# Patient Record
Sex: FEMALE | Race: WHITE | Employment: OTHER | URBAN - NONMETROPOLITAN AREA
[De-identification: names, ages, dates, MRNs, and addresses within clinical notes are randomized per-mention and may not be internally consistent; named-entity substitution may affect disease eponyms.]

---

## 2019-01-03 ENCOUNTER — HOSPITAL ENCOUNTER (OUTPATIENT)
Dept: GENERAL RADIOLOGY | Age: 69
Discharge: HOME | End: 2019-01-03
Payer: MEDICARE

## 2019-01-03 ENCOUNTER — HOSPITAL ENCOUNTER (OUTPATIENT)
Dept: PREADMISSION TESTING | Age: 69
Discharge: HOME | End: 2019-01-07
Payer: MEDICARE

## 2019-01-03 VITALS
HEART RATE: 75 BPM | OXYGEN SATURATION: 96 % | HEIGHT: 69 IN | DIASTOLIC BLOOD PRESSURE: 66 MMHG | RESPIRATION RATE: 16 BRPM | BODY MASS INDEX: 27.49 KG/M2 | SYSTOLIC BLOOD PRESSURE: 109 MMHG | WEIGHT: 185.63 LBS | TEMPERATURE: 98.8 F

## 2019-01-03 DIAGNOSIS — Z01.818 PREOP TESTING: ICD-10-CM

## 2019-01-03 DIAGNOSIS — M48.061 SPINAL STENOSIS OF LUMBAR REGION, UNSPECIFIED WHETHER NEUROGENIC CLAUDICATION PRESENT: ICD-10-CM

## 2019-01-03 LAB
ANION GAP SERPL CALCULATED.3IONS-SCNC: 16 MEQ/L (ref 8–16)
APTT: 28.9 SECONDS (ref 22–38)
BUN BLDV-MCNC: 18 MG/DL (ref 7–22)
CALCIUM SERPL-MCNC: 8.9 MG/DL (ref 8.5–10.5)
CHLORIDE BLD-SCNC: 103 MEQ/L (ref 98–111)
CO2: 24 MEQ/L (ref 23–33)
CREAT SERPL-MCNC: 0.9 MG/DL (ref 0.4–1.2)
EKG ATRIAL RATE: 72 BPM
EKG P AXIS: 38 DEGREES
EKG P-R INTERVAL: 164 MS
EKG Q-T INTERVAL: 424 MS
EKG QRS DURATION: 90 MS
EKG QTC CALCULATION (BAZETT): 464 MS
EKG R AXIS: 16 DEGREES
EKG T AXIS: 53 DEGREES
EKG VENTRICULAR RATE: 72 BPM
ERYTHROCYTE [DISTWIDTH] IN BLOOD BY AUTOMATED COUNT: 14.2 % (ref 11.5–14.5)
ERYTHROCYTE [DISTWIDTH] IN BLOOD BY AUTOMATED COUNT: 46 FL (ref 35–45)
GFR SERPL CREATININE-BSD FRML MDRD: 62 ML/MIN/1.73M2
GLUCOSE BLD-MCNC: 156 MG/DL (ref 70–108)
HCT VFR BLD CALC: 48.8 % (ref 37–47)
HEMOGLOBIN: 15 GM/DL (ref 12–16)
INR BLD: 0.93 (ref 0.85–1.13)
MCH RBC QN AUTO: 27.4 PG (ref 26–33)
MCHC RBC AUTO-ENTMCNC: 30.7 GM/DL (ref 32.2–35.5)
MCV RBC AUTO: 89.1 FL (ref 81–99)
MRSA NASAL SCREEN RT-PCR: NEGATIVE
PLATELET # BLD: 243 THOU/MM3 (ref 130–400)
PMV BLD AUTO: 10.8 FL (ref 9.4–12.4)
POTASSIUM SERPL-SCNC: 3.8 MEQ/L (ref 3.5–5.2)
RBC # BLD: 5.48 MILL/MM3 (ref 4.2–5.4)
SODIUM BLD-SCNC: 143 MEQ/L (ref 135–145)
STAPH AUREUS SCREEN RT-PCR: NEGATIVE
WBC # BLD: 6.9 THOU/MM3 (ref 4.8–10.8)

## 2019-01-03 PROCEDURE — 93010 ELECTROCARDIOGRAM REPORT: CPT | Performed by: INTERNAL MEDICINE

## 2019-01-03 PROCEDURE — 85730 THROMBOPLASTIN TIME PARTIAL: CPT

## 2019-01-03 PROCEDURE — 87640 STAPH A DNA AMP PROBE: CPT

## 2019-01-03 PROCEDURE — 85610 PROTHROMBIN TIME: CPT

## 2019-01-03 PROCEDURE — 93005 ELECTROCARDIOGRAM TRACING: CPT | Performed by: ORTHOPAEDIC SURGERY

## 2019-01-03 PROCEDURE — 71046 X-RAY EXAM CHEST 2 VIEWS: CPT

## 2019-01-03 PROCEDURE — 36415 COLL VENOUS BLD VENIPUNCTURE: CPT

## 2019-01-03 PROCEDURE — 87081 CULTURE SCREEN ONLY: CPT

## 2019-01-03 PROCEDURE — 87641 MR-STAPH DNA AMP PROBE: CPT

## 2019-01-03 PROCEDURE — 80048 BASIC METABOLIC PNL TOTAL CA: CPT

## 2019-01-03 RX ORDER — PANTOPRAZOLE SODIUM 40 MG/1
40 TABLET, DELAYED RELEASE ORAL EVERY MORNING
COMMUNITY
End: 2021-03-16

## 2019-01-03 RX ORDER — HYDROCODONE BITARTRATE AND ACETAMINOPHEN 5; 325 MG/1; MG/1
1 TABLET ORAL EVERY 12 HOURS
Status: ON HOLD | COMMUNITY
End: 2019-01-22 | Stop reason: HOSPADM

## 2019-01-03 RX ORDER — ALENDRONATE SODIUM 35 MG/1
35 TABLET ORAL
COMMUNITY
End: 2021-08-04 | Stop reason: SDUPTHER

## 2019-01-03 ASSESSMENT — PAIN DESCRIPTION - PROGRESSION: CLINICAL_PROGRESSION: NOT CHANGED

## 2019-01-03 ASSESSMENT — PAIN DESCRIPTION - ORIENTATION: ORIENTATION: LEFT

## 2019-01-03 ASSESSMENT — PAIN SCALES - GENERAL: PAINLEVEL_OUTOF10: 6

## 2019-01-03 ASSESSMENT — PAIN DESCRIPTION - PAIN TYPE: TYPE: CHRONIC PAIN

## 2019-01-03 ASSESSMENT — PAIN DESCRIPTION - ONSET: ONSET: AWAKENED FROM SLEEP

## 2019-01-03 ASSESSMENT — PAIN DESCRIPTION - DESCRIPTORS: DESCRIPTORS: THROBBING

## 2019-01-03 ASSESSMENT — PAIN DESCRIPTION - FREQUENCY: FREQUENCY: CONTINUOUS

## 2019-01-03 ASSESSMENT — PAIN DESCRIPTION - LOCATION: LOCATION: HIP

## 2019-01-05 LAB — MRSA SCREEN: NORMAL

## 2019-01-18 ENCOUNTER — HOSPITAL ENCOUNTER (INPATIENT)
Age: 69
LOS: 4 days | Discharge: NURSING, REHAB OR LONG-TERM CARE | DRG: 460 | End: 2019-01-22
Attending: ORTHOPAEDIC SURGERY | Admitting: ORTHOPAEDIC SURGERY
Payer: MEDICARE

## 2019-01-18 ENCOUNTER — ANESTHESIA (OUTPATIENT)
Dept: OPERATING ROOM | Age: 69
DRG: 460 | End: 2019-01-18
Payer: MEDICARE

## 2019-01-18 ENCOUNTER — APPOINTMENT (OUTPATIENT)
Dept: GENERAL RADIOLOGY | Age: 69
DRG: 460 | End: 2019-01-18
Attending: ORTHOPAEDIC SURGERY
Payer: MEDICARE

## 2019-01-18 ENCOUNTER — ANESTHESIA EVENT (OUTPATIENT)
Dept: OPERATING ROOM | Age: 69
DRG: 460 | End: 2019-01-18
Payer: MEDICARE

## 2019-01-18 VITALS
OXYGEN SATURATION: 99 % | TEMPERATURE: 99 F | RESPIRATION RATE: 8 BRPM | SYSTOLIC BLOOD PRESSURE: 119 MMHG | DIASTOLIC BLOOD PRESSURE: 59 MMHG

## 2019-01-18 DIAGNOSIS — M48.061 SPINAL STENOSIS OF LUMBAR REGION AT MULTIPLE LEVELS: Primary | ICD-10-CM

## 2019-01-18 LAB
ABO: NORMAL
ANTIBODY SCREEN: NORMAL
GLUCOSE BLD-MCNC: 101 MG/DL (ref 70–108)
RH FACTOR: NORMAL

## 2019-01-18 PROCEDURE — 36415 COLL VENOUS BLD VENIPUNCTURE: CPT

## 2019-01-18 PROCEDURE — 86901 BLOOD TYPING SEROLOGIC RH(D): CPT

## 2019-01-18 PROCEDURE — 2580000003 HC RX 258: Performed by: PHYSICIAN ASSISTANT

## 2019-01-18 PROCEDURE — 72020 X-RAY EXAM OF SPINE 1 VIEW: CPT

## 2019-01-18 PROCEDURE — 82948 REAGENT STRIP/BLOOD GLUCOSE: CPT

## 2019-01-18 PROCEDURE — 3600000005 HC SURGERY LEVEL 5 BASE: Performed by: ORTHOPAEDIC SURGERY

## 2019-01-18 PROCEDURE — 6360000002 HC RX W HCPCS: Performed by: ORTHOPAEDIC SURGERY

## 2019-01-18 PROCEDURE — 2500000003 HC RX 250 WO HCPCS: Performed by: NURSE ANESTHETIST, CERTIFIED REGISTERED

## 2019-01-18 PROCEDURE — 3700000001 HC ADD 15 MINUTES (ANESTHESIA): Performed by: ORTHOPAEDIC SURGERY

## 2019-01-18 PROCEDURE — 0SG1071 FUSION OF 2 OR MORE LUMBAR VERTEBRAL JOINTS WITH AUTOLOGOUS TISSUE SUBSTITUTE, POSTERIOR APPROACH, POSTERIOR COLUMN, OPEN APPROACH: ICD-10-PCS | Performed by: ORTHOPAEDIC SURGERY

## 2019-01-18 PROCEDURE — 6370000000 HC RX 637 (ALT 250 FOR IP): Performed by: PHYSICIAN ASSISTANT

## 2019-01-18 PROCEDURE — 2709999900 HC NON-CHARGEABLE SUPPLY: Performed by: ORTHOPAEDIC SURGERY

## 2019-01-18 PROCEDURE — 86900 BLOOD TYPING SEROLOGIC ABO: CPT

## 2019-01-18 PROCEDURE — 0QP004Z REMOVAL OF INTERNAL FIXATION DEVICE FROM LUMBAR VERTEBRA, OPEN APPROACH: ICD-10-PCS | Performed by: ORTHOPAEDIC SURGERY

## 2019-01-18 PROCEDURE — 6360000002 HC RX W HCPCS: Performed by: PHYSICIAN ASSISTANT

## 2019-01-18 PROCEDURE — 86850 RBC ANTIBODY SCREEN: CPT

## 2019-01-18 PROCEDURE — 6360000002 HC RX W HCPCS: Performed by: ANESTHESIOLOGY

## 2019-01-18 PROCEDURE — 2780000010 HC IMPLANT OTHER: Performed by: ORTHOPAEDIC SURGERY

## 2019-01-18 PROCEDURE — 1200000000 HC SEMI PRIVATE

## 2019-01-18 PROCEDURE — 6360000002 HC RX W HCPCS: Performed by: NURSE ANESTHETIST, CERTIFIED REGISTERED

## 2019-01-18 PROCEDURE — 2720000010 HC SURG SUPPLY STERILE: Performed by: ORTHOPAEDIC SURGERY

## 2019-01-18 PROCEDURE — 3700000000 HC ANESTHESIA ATTENDED CARE: Performed by: ORTHOPAEDIC SURGERY

## 2019-01-18 PROCEDURE — 7100000000 HC PACU RECOVERY - FIRST 15 MIN: Performed by: ORTHOPAEDIC SURGERY

## 2019-01-18 PROCEDURE — 7100000001 HC PACU RECOVERY - ADDTL 15 MIN: Performed by: ORTHOPAEDIC SURGERY

## 2019-01-18 PROCEDURE — C1713 ANCHOR/SCREW BN/BN,TIS/BN: HCPCS | Performed by: ORTHOPAEDIC SURGERY

## 2019-01-18 PROCEDURE — 3600000015 HC SURGERY LEVEL 5 ADDTL 15MIN: Performed by: ORTHOPAEDIC SURGERY

## 2019-01-18 DEVICE — ROD 8690070 CDH PBNT 5.5X70 TI
Type: IMPLANTABLE DEVICE | Site: SPINE LUMBAR | Status: FUNCTIONAL
Brand: CD HORIZON® SPINAL SYSTEM

## 2019-01-18 DEVICE — ALLOGRAFT STRP DBM PLF GRFT 2.5CM X 5CM: Type: IMPLANTABLE DEVICE | Site: SPINE LUMBAR | Status: FUNCTIONAL

## 2019-01-18 DEVICE — FLOSEAL HEMOSTATIC MATRIX, 5 ML
Type: IMPLANTABLE DEVICE | Site: SPINE LUMBAR | Status: FUNCTIONAL
Brand: FLOSEAL

## 2019-01-18 DEVICE — CROSSLINK 811-322 LP MLTSP PL L 1.752.15
Type: IMPLANTABLE DEVICE | Site: SPINE LUMBAR | Status: FUNCTIONAL
Brand: TSRH® SPINAL SYSTEM

## 2019-01-18 RX ORDER — HYDROMORPHONE HCL 110MG/55ML
PATIENT CONTROLLED ANALGESIA SYRINGE INTRAVENOUS PRN
Status: DISCONTINUED | OUTPATIENT
Start: 2019-01-18 | End: 2019-01-18 | Stop reason: SDUPTHER

## 2019-01-18 RX ORDER — DEXAMETHASONE SODIUM PHOSPHATE 4 MG/ML
INJECTION, SOLUTION INTRA-ARTICULAR; INTRALESIONAL; INTRAMUSCULAR; INTRAVENOUS; SOFT TISSUE PRN
Status: DISCONTINUED | OUTPATIENT
Start: 2019-01-18 | End: 2019-01-18 | Stop reason: SDUPTHER

## 2019-01-18 RX ORDER — GLYCOPYRROLATE 1 MG/5 ML
SYRINGE (ML) INTRAVENOUS PRN
Status: DISCONTINUED | OUTPATIENT
Start: 2019-01-18 | End: 2019-01-18 | Stop reason: SDUPTHER

## 2019-01-18 RX ORDER — FENTANYL CITRATE 50 UG/ML
INJECTION, SOLUTION INTRAMUSCULAR; INTRAVENOUS PRN
Status: DISCONTINUED | OUTPATIENT
Start: 2019-01-18 | End: 2019-01-18 | Stop reason: SDUPTHER

## 2019-01-18 RX ORDER — VANCOMYCIN HYDROCHLORIDE 1 G/20ML
INJECTION, POWDER, LYOPHILIZED, FOR SOLUTION INTRAVENOUS PRN
Status: DISCONTINUED | OUTPATIENT
Start: 2019-01-18 | End: 2019-01-18 | Stop reason: HOSPADM

## 2019-01-18 RX ORDER — ACETAMINOPHEN 325 MG/1
650 TABLET ORAL EVERY 4 HOURS PRN
Status: DISCONTINUED | OUTPATIENT
Start: 2019-01-18 | End: 2019-01-22 | Stop reason: HOSPADM

## 2019-01-18 RX ORDER — CYCLOBENZAPRINE HCL 10 MG
10 TABLET ORAL 3 TIMES DAILY PRN
Status: DISCONTINUED | OUTPATIENT
Start: 2019-01-18 | End: 2019-01-22 | Stop reason: HOSPADM

## 2019-01-18 RX ORDER — OXYCODONE HYDROCHLORIDE AND ACETAMINOPHEN 5; 325 MG/1; MG/1
1 TABLET ORAL EVERY 4 HOURS PRN
Status: DISCONTINUED | OUTPATIENT
Start: 2019-01-18 | End: 2019-01-22 | Stop reason: HOSPADM

## 2019-01-18 RX ORDER — SODIUM CHLORIDE 0.9 % (FLUSH) 0.9 %
10 SYRINGE (ML) INJECTION EVERY 12 HOURS SCHEDULED
Status: DISCONTINUED | OUTPATIENT
Start: 2019-01-18 | End: 2019-01-18 | Stop reason: HOSPADM

## 2019-01-18 RX ORDER — SUCCINYLCHOLINE CHLORIDE 20 MG/ML
INJECTION INTRAMUSCULAR; INTRAVENOUS PRN
Status: DISCONTINUED | OUTPATIENT
Start: 2019-01-18 | End: 2019-01-18 | Stop reason: SDUPTHER

## 2019-01-18 RX ORDER — SODIUM CHLORIDE 0.9 % (FLUSH) 0.9 %
10 SYRINGE (ML) INJECTION PRN
Status: DISCONTINUED | OUTPATIENT
Start: 2019-01-18 | End: 2019-01-22 | Stop reason: HOSPADM

## 2019-01-18 RX ORDER — OXYCODONE HYDROCHLORIDE AND ACETAMINOPHEN 5; 325 MG/1; MG/1
2 TABLET ORAL EVERY 4 HOURS PRN
Status: DISCONTINUED | OUTPATIENT
Start: 2019-01-18 | End: 2019-01-22 | Stop reason: HOSPADM

## 2019-01-18 RX ORDER — ONDANSETRON 2 MG/ML
4 INJECTION INTRAMUSCULAR; INTRAVENOUS EVERY 6 HOURS PRN
Status: DISCONTINUED | OUTPATIENT
Start: 2019-01-18 | End: 2019-01-22 | Stop reason: HOSPADM

## 2019-01-18 RX ORDER — DOCUSATE SODIUM 100 MG/1
100 CAPSULE, LIQUID FILLED ORAL 2 TIMES DAILY
Status: DISCONTINUED | OUTPATIENT
Start: 2019-01-18 | End: 2019-01-22 | Stop reason: HOSPADM

## 2019-01-18 RX ORDER — PROPOFOL 10 MG/ML
INJECTION, EMULSION INTRAVENOUS PRN
Status: DISCONTINUED | OUTPATIENT
Start: 2019-01-18 | End: 2019-01-18 | Stop reason: SDUPTHER

## 2019-01-18 RX ORDER — ROCURONIUM BROMIDE 10 MG/ML
INJECTION, SOLUTION INTRAVENOUS PRN
Status: DISCONTINUED | OUTPATIENT
Start: 2019-01-18 | End: 2019-01-18 | Stop reason: SDUPTHER

## 2019-01-18 RX ORDER — BISACODYL 10 MG
10 SUPPOSITORY, RECTAL RECTAL DAILY PRN
Status: DISCONTINUED | OUTPATIENT
Start: 2019-01-18 | End: 2019-01-22 | Stop reason: HOSPADM

## 2019-01-18 RX ORDER — SODIUM CHLORIDE 9 MG/ML
INJECTION, SOLUTION INTRAVENOUS CONTINUOUS
Status: DISCONTINUED | OUTPATIENT
Start: 2019-01-18 | End: 2019-01-18

## 2019-01-18 RX ORDER — FENTANYL CITRATE 50 UG/ML
50 INJECTION, SOLUTION INTRAMUSCULAR; INTRAVENOUS EVERY 5 MIN PRN
Status: DISCONTINUED | OUTPATIENT
Start: 2019-01-18 | End: 2019-01-18 | Stop reason: HOSPADM

## 2019-01-18 RX ORDER — SODIUM CHLORIDE 0.9 % (FLUSH) 0.9 %
10 SYRINGE (ML) INJECTION PRN
Status: DISCONTINUED | OUTPATIENT
Start: 2019-01-18 | End: 2019-01-18 | Stop reason: HOSPADM

## 2019-01-18 RX ORDER — ACETAMINOPHEN 650 MG/1
650 SUPPOSITORY RECTAL EVERY 4 HOURS PRN
Status: DISCONTINUED | OUTPATIENT
Start: 2019-01-18 | End: 2019-01-22 | Stop reason: HOSPADM

## 2019-01-18 RX ORDER — MORPHINE SULFATE 4 MG/ML
4 INJECTION, SOLUTION INTRAMUSCULAR; INTRAVENOUS
Status: DISCONTINUED | OUTPATIENT
Start: 2019-01-18 | End: 2019-01-22 | Stop reason: HOSPADM

## 2019-01-18 RX ORDER — METOPROLOL TARTRATE 50 MG/1
50 TABLET, FILM COATED ORAL 2 TIMES DAILY
Status: DISCONTINUED | OUTPATIENT
Start: 2019-01-18 | End: 2019-01-22 | Stop reason: HOSPADM

## 2019-01-18 RX ORDER — GABAPENTIN 600 MG/1
600 TABLET ORAL 3 TIMES DAILY
Status: DISCONTINUED | OUTPATIENT
Start: 2019-01-18 | End: 2019-01-22 | Stop reason: HOSPADM

## 2019-01-18 RX ORDER — SODIUM CHLORIDE 0.9 % (FLUSH) 0.9 %
10 SYRINGE (ML) INJECTION EVERY 12 HOURS SCHEDULED
Status: DISCONTINUED | OUTPATIENT
Start: 2019-01-18 | End: 2019-01-22 | Stop reason: HOSPADM

## 2019-01-18 RX ORDER — MORPHINE SULFATE 2 MG/ML
2 INJECTION, SOLUTION INTRAMUSCULAR; INTRAVENOUS EVERY 5 MIN PRN
Status: DISCONTINUED | OUTPATIENT
Start: 2019-01-18 | End: 2019-01-18 | Stop reason: HOSPADM

## 2019-01-18 RX ORDER — NEOSTIGMINE METHYLSULFATE 1 MG/ML
INJECTION, SOLUTION INTRAVENOUS PRN
Status: DISCONTINUED | OUTPATIENT
Start: 2019-01-18 | End: 2019-01-18 | Stop reason: SDUPTHER

## 2019-01-18 RX ORDER — LABETALOL HYDROCHLORIDE 5 MG/ML
10 INJECTION, SOLUTION INTRAVENOUS EVERY 10 MIN PRN
Status: DISCONTINUED | OUTPATIENT
Start: 2019-01-18 | End: 2019-01-18 | Stop reason: HOSPADM

## 2019-01-18 RX ORDER — PANTOPRAZOLE SODIUM 40 MG/1
40 TABLET, DELAYED RELEASE ORAL EVERY MORNING
Status: DISCONTINUED | OUTPATIENT
Start: 2019-01-18 | End: 2019-01-22 | Stop reason: HOSPADM

## 2019-01-18 RX ORDER — MORPHINE SULFATE 2 MG/ML
2 INJECTION, SOLUTION INTRAMUSCULAR; INTRAVENOUS
Status: DISCONTINUED | OUTPATIENT
Start: 2019-01-18 | End: 2019-01-22 | Stop reason: HOSPADM

## 2019-01-18 RX ORDER — ONDANSETRON 2 MG/ML
INJECTION INTRAMUSCULAR; INTRAVENOUS PRN
Status: DISCONTINUED | OUTPATIENT
Start: 2019-01-18 | End: 2019-01-18 | Stop reason: SDUPTHER

## 2019-01-18 RX ORDER — MIDAZOLAM HYDROCHLORIDE 1 MG/ML
INJECTION INTRAMUSCULAR; INTRAVENOUS PRN
Status: DISCONTINUED | OUTPATIENT
Start: 2019-01-18 | End: 2019-01-18 | Stop reason: SDUPTHER

## 2019-01-18 RX ORDER — SODIUM CHLORIDE 9 MG/ML
INJECTION, SOLUTION INTRAVENOUS CONTINUOUS
Status: DISCONTINUED | OUTPATIENT
Start: 2019-01-18 | End: 2019-01-19

## 2019-01-18 RX ADMIN — Medication 0.6 MG: at 10:12

## 2019-01-18 RX ADMIN — PROPOFOL 50 MG: 10 INJECTION, EMULSION INTRAVENOUS at 09:22

## 2019-01-18 RX ADMIN — ROCURONIUM BROMIDE 35 MG: 10 INJECTION INTRAVENOUS at 08:13

## 2019-01-18 RX ADMIN — PROPOFOL 50 MG: 10 INJECTION, EMULSION INTRAVENOUS at 09:24

## 2019-01-18 RX ADMIN — NEOSTIGMINE METHYLSULFATE 4 MG: 1 INJECTION, SOLUTION INTRAVENOUS at 10:12

## 2019-01-18 RX ADMIN — SODIUM CHLORIDE: 9 INJECTION, SOLUTION INTRAVENOUS at 11:55

## 2019-01-18 RX ADMIN — MAGNESIUM HYDROXIDE 30 ML: 400 SUSPENSION ORAL at 21:05

## 2019-01-18 RX ADMIN — CYCLOBENZAPRINE HYDROCHLORIDE 10 MG: 10 TABLET, FILM COATED ORAL at 12:54

## 2019-01-18 RX ADMIN — DOCUSATE SODIUM 100 MG: 100 CAPSULE, LIQUID FILLED ORAL at 21:05

## 2019-01-18 RX ADMIN — CYCLOBENZAPRINE HYDROCHLORIDE 10 MG: 10 TABLET, FILM COATED ORAL at 21:05

## 2019-01-18 RX ADMIN — DOCUSATE SODIUM 100 MG: 100 CAPSULE, LIQUID FILLED ORAL at 12:54

## 2019-01-18 RX ADMIN — Medication 2 G: at 15:55

## 2019-01-18 RX ADMIN — SODIUM CHLORIDE: 9 INJECTION, SOLUTION INTRAVENOUS at 09:25

## 2019-01-18 RX ADMIN — SODIUM CHLORIDE: 9 INJECTION, SOLUTION INTRAVENOUS at 23:59

## 2019-01-18 RX ADMIN — MIDAZOLAM HYDROCHLORIDE 2 MG: 1 INJECTION, SOLUTION INTRAMUSCULAR; INTRAVENOUS at 08:02

## 2019-01-18 RX ADMIN — FENTANYL CITRATE 100 MCG: 50 INJECTION INTRAMUSCULAR; INTRAVENOUS at 08:02

## 2019-01-18 RX ADMIN — HYDROMORPHONE HYDROCHLORIDE 1 MG: 2 INJECTION INTRAMUSCULAR; INTRAVENOUS; SUBCUTANEOUS at 08:54

## 2019-01-18 RX ADMIN — OXYCODONE AND ACETAMINOPHEN 2 TABLET: 5; 325 TABLET ORAL at 14:35

## 2019-01-18 RX ADMIN — METOPROLOL TARTRATE 50 MG: 50 TABLET, FILM COATED ORAL at 21:05

## 2019-01-18 RX ADMIN — PROPOFOL 150 MG: 10 INJECTION, EMULSION INTRAVENOUS at 08:04

## 2019-01-18 RX ADMIN — SUCCINYLCHOLINE CHLORIDE 120 MG: 20 INJECTION, SOLUTION INTRAMUSCULAR; INTRAVENOUS at 08:04

## 2019-01-18 RX ADMIN — PROPOFOL 50 MG: 10 INJECTION, EMULSION INTRAVENOUS at 09:26

## 2019-01-18 RX ADMIN — METFORMIN HYDROCHLORIDE 500 MG: 500 TABLET ORAL at 18:08

## 2019-01-18 RX ADMIN — MORPHINE SULFATE 2 MG: 2 INJECTION, SOLUTION INTRAMUSCULAR; INTRAVENOUS at 12:54

## 2019-01-18 RX ADMIN — DEXAMETHASONE SODIUM PHOSPHATE 8 MG: 4 INJECTION, SOLUTION INTRAMUSCULAR; INTRAVENOUS at 08:13

## 2019-01-18 RX ADMIN — OXYCODONE AND ACETAMINOPHEN 2 TABLET: 5; 325 TABLET ORAL at 19:24

## 2019-01-18 RX ADMIN — FENTANYL CITRATE 50 MCG: 50 INJECTION INTRAMUSCULAR; INTRAVENOUS at 08:35

## 2019-01-18 RX ADMIN — GABAPENTIN 600 MG: 600 TABLET, FILM COATED ORAL at 22:00

## 2019-01-18 RX ADMIN — MORPHINE SULFATE 2 MG: 2 INJECTION, SOLUTION INTRAMUSCULAR; INTRAVENOUS at 10:50

## 2019-01-18 RX ADMIN — GABAPENTIN 600 MG: 600 TABLET, FILM COATED ORAL at 15:55

## 2019-01-18 RX ADMIN — SODIUM CHLORIDE: 9 INJECTION, SOLUTION INTRAVENOUS at 07:50

## 2019-01-18 RX ADMIN — Medication 2 G: at 08:20

## 2019-01-18 RX ADMIN — HYDROMORPHONE HYDROCHLORIDE 1 MG: 2 INJECTION INTRAMUSCULAR; INTRAVENOUS; SUBCUTANEOUS at 09:19

## 2019-01-18 RX ADMIN — ONDANSETRON 4 MG: 2 INJECTION INTRAMUSCULAR; INTRAVENOUS at 15:55

## 2019-01-18 RX ADMIN — PANTOPRAZOLE SODIUM 40 MG: 40 TABLET, DELAYED RELEASE ORAL at 12:54

## 2019-01-18 RX ADMIN — ONDANSETRON HYDROCHLORIDE 4 MG: 4 INJECTION, SOLUTION INTRAMUSCULAR; INTRAVENOUS at 10:12

## 2019-01-18 RX ADMIN — Medication 2 G: at 23:59

## 2019-01-18 RX ADMIN — FENTANYL CITRATE 50 MCG: 50 INJECTION, SOLUTION INTRAMUSCULAR; INTRAVENOUS at 10:45

## 2019-01-18 ASSESSMENT — PULMONARY FUNCTION TESTS
PIF_VALUE: 16
PIF_VALUE: 16
PIF_VALUE: 17
PIF_VALUE: 16
PIF_VALUE: 16
PIF_VALUE: 17
PIF_VALUE: 16
PIF_VALUE: 16
PIF_VALUE: 1
PIF_VALUE: 16
PIF_VALUE: 19
PIF_VALUE: 22
PIF_VALUE: 16
PIF_VALUE: 17
PIF_VALUE: 16
PIF_VALUE: 17
PIF_VALUE: 16
PIF_VALUE: 17
PIF_VALUE: 16
PIF_VALUE: 17
PIF_VALUE: 16
PIF_VALUE: 17
PIF_VALUE: 0
PIF_VALUE: 16
PIF_VALUE: 42
PIF_VALUE: 16
PIF_VALUE: 16
PIF_VALUE: 1
PIF_VALUE: 16
PIF_VALUE: 17
PIF_VALUE: 16
PIF_VALUE: 17
PIF_VALUE: 17
PIF_VALUE: 16
PIF_VALUE: 17
PIF_VALUE: 16
PIF_VALUE: 17
PIF_VALUE: 0
PIF_VALUE: 16
PIF_VALUE: 18
PIF_VALUE: 16
PIF_VALUE: 17
PIF_VALUE: 17
PIF_VALUE: 16
PIF_VALUE: 16
PIF_VALUE: 17
PIF_VALUE: 16
PIF_VALUE: 0
PIF_VALUE: 15
PIF_VALUE: 17
PIF_VALUE: 16
PIF_VALUE: 17
PIF_VALUE: 16
PIF_VALUE: 17
PIF_VALUE: 16
PIF_VALUE: 4
PIF_VALUE: 17
PIF_VALUE: 16
PIF_VALUE: 15
PIF_VALUE: 17
PIF_VALUE: 16
PIF_VALUE: 17
PIF_VALUE: 16
PIF_VALUE: 16
PIF_VALUE: 18
PIF_VALUE: 17
PIF_VALUE: 16
PIF_VALUE: 1
PIF_VALUE: 16
PIF_VALUE: 15
PIF_VALUE: 16
PIF_VALUE: 16
PIF_VALUE: 15
PIF_VALUE: 16
PIF_VALUE: 16
PIF_VALUE: 15
PIF_VALUE: 16
PIF_VALUE: 3
PIF_VALUE: 16
PIF_VALUE: 3
PIF_VALUE: 16
PIF_VALUE: 17
PIF_VALUE: 16
PIF_VALUE: 16

## 2019-01-18 ASSESSMENT — PAIN DESCRIPTION - LOCATION
LOCATION: BACK

## 2019-01-18 ASSESSMENT — PAIN SCALES - GENERAL
PAINLEVEL_OUTOF10: 2
PAINLEVEL_OUTOF10: 4
PAINLEVEL_OUTOF10: 10
PAINLEVEL_OUTOF10: 5
PAINLEVEL_OUTOF10: 6
PAINLEVEL_OUTOF10: 7
PAINLEVEL_OUTOF10: 7
PAINLEVEL_OUTOF10: 4
PAINLEVEL_OUTOF10: 7
PAINLEVEL_OUTOF10: 2
PAINLEVEL_OUTOF10: 9

## 2019-01-18 ASSESSMENT — PAIN DESCRIPTION - ORIENTATION
ORIENTATION: LOWER
ORIENTATION: LOWER

## 2019-01-18 ASSESSMENT — PAIN DESCRIPTION - ONSET
ONSET: ON-GOING
ONSET: ON-GOING

## 2019-01-18 ASSESSMENT — PAIN DESCRIPTION - PROGRESSION
CLINICAL_PROGRESSION: GRADUALLY IMPROVING
CLINICAL_PROGRESSION: NOT CHANGED

## 2019-01-18 ASSESSMENT — PAIN DESCRIPTION - PAIN TYPE
TYPE: SURGICAL PAIN
TYPE: SURGICAL PAIN
TYPE: ACUTE PAIN;SURGICAL PAIN

## 2019-01-18 ASSESSMENT — PAIN DESCRIPTION - DIRECTION
RADIATING_TOWARDS: LLE
RADIATING_TOWARDS: LLE

## 2019-01-18 ASSESSMENT — PAIN DESCRIPTION - DESCRIPTORS
DESCRIPTORS: THROBBING
DESCRIPTORS: THROBBING

## 2019-01-18 ASSESSMENT — PAIN - FUNCTIONAL ASSESSMENT: PAIN_FUNCTIONAL_ASSESSMENT: PREVENTS OR INTERFERES SOME ACTIVE ACTIVITIES AND ADLS

## 2019-01-18 ASSESSMENT — PAIN DESCRIPTION - FREQUENCY
FREQUENCY: CONTINUOUS
FREQUENCY: CONTINUOUS

## 2019-01-19 LAB
ANION GAP SERPL CALCULATED.3IONS-SCNC: 11 MEQ/L (ref 8–16)
BUN BLDV-MCNC: 14 MG/DL (ref 7–22)
CALCIUM SERPL-MCNC: 8.3 MG/DL (ref 8.5–10.5)
CHLORIDE BLD-SCNC: 106 MEQ/L (ref 98–111)
CO2: 29 MEQ/L (ref 23–33)
CREAT SERPL-MCNC: 0.5 MG/DL (ref 0.4–1.2)
GFR SERPL CREATININE-BSD FRML MDRD: > 90 ML/MIN/1.73M2
GLUCOSE BLD-MCNC: 103 MG/DL (ref 70–108)
GLUCOSE BLD-MCNC: 115 MG/DL (ref 70–108)
GLUCOSE BLD-MCNC: 117 MG/DL (ref 70–108)
HCT VFR BLD CALC: 32.4 % (ref 37–47)
HEMOGLOBIN: 10 GM/DL (ref 12–16)
POTASSIUM SERPL-SCNC: 4.6 MEQ/L (ref 3.5–5.2)
SODIUM BLD-SCNC: 146 MEQ/L (ref 135–145)

## 2019-01-19 PROCEDURE — G8978 MOBILITY CURRENT STATUS: HCPCS

## 2019-01-19 PROCEDURE — 6370000000 HC RX 637 (ALT 250 FOR IP): Performed by: PHYSICIAN ASSISTANT

## 2019-01-19 PROCEDURE — 1200000000 HC SEMI PRIVATE

## 2019-01-19 PROCEDURE — 82948 REAGENT STRIP/BLOOD GLUCOSE: CPT

## 2019-01-19 PROCEDURE — 6370000000 HC RX 637 (ALT 250 FOR IP): Performed by: INTERNAL MEDICINE

## 2019-01-19 PROCEDURE — 97161 PT EVAL LOW COMPLEX 20 MIN: CPT

## 2019-01-19 PROCEDURE — 85014 HEMATOCRIT: CPT

## 2019-01-19 PROCEDURE — G8979 MOBILITY GOAL STATUS: HCPCS

## 2019-01-19 PROCEDURE — 97530 THERAPEUTIC ACTIVITIES: CPT

## 2019-01-19 PROCEDURE — 97166 OT EVAL MOD COMPLEX 45 MIN: CPT

## 2019-01-19 PROCEDURE — 80048 BASIC METABOLIC PNL TOTAL CA: CPT

## 2019-01-19 PROCEDURE — 2580000003 HC RX 258: Performed by: INTERNAL MEDICINE

## 2019-01-19 PROCEDURE — 85018 HEMOGLOBIN: CPT

## 2019-01-19 PROCEDURE — 36415 COLL VENOUS BLD VENIPUNCTURE: CPT

## 2019-01-19 RX ORDER — 0.9 % SODIUM CHLORIDE 0.9 %
500 INTRAVENOUS SOLUTION INTRAVENOUS ONCE
Status: COMPLETED | OUTPATIENT
Start: 2019-01-19 | End: 2019-01-19

## 2019-01-19 RX ORDER — SODIUM CHLORIDE 450 MG/100ML
INJECTION, SOLUTION INTRAVENOUS CONTINUOUS
Status: DISCONTINUED | OUTPATIENT
Start: 2019-01-19 | End: 2019-01-20

## 2019-01-19 RX ORDER — NICOTINE POLACRILEX 4 MG
15 LOZENGE BUCCAL PRN
Status: DISCONTINUED | OUTPATIENT
Start: 2019-01-19 | End: 2019-01-22 | Stop reason: HOSPADM

## 2019-01-19 RX ORDER — DEXTROSE MONOHYDRATE 25 G/50ML
12.5 INJECTION, SOLUTION INTRAVENOUS PRN
Status: DISCONTINUED | OUTPATIENT
Start: 2019-01-19 | End: 2019-01-22 | Stop reason: HOSPADM

## 2019-01-19 RX ORDER — DEXTROSE MONOHYDRATE 50 MG/ML
100 INJECTION, SOLUTION INTRAVENOUS PRN
Status: DISCONTINUED | OUTPATIENT
Start: 2019-01-19 | End: 2019-01-22 | Stop reason: HOSPADM

## 2019-01-19 RX ADMIN — OXYCODONE AND ACETAMINOPHEN 2 TABLET: 5; 325 TABLET ORAL at 16:27

## 2019-01-19 RX ADMIN — DOCUSATE SODIUM 100 MG: 100 CAPSULE, LIQUID FILLED ORAL at 21:29

## 2019-01-19 RX ADMIN — SODIUM CHLORIDE 500 ML: 9 INJECTION, SOLUTION INTRAVENOUS at 14:07

## 2019-01-19 RX ADMIN — GABAPENTIN 600 MG: 600 TABLET, FILM COATED ORAL at 08:20

## 2019-01-19 RX ADMIN — DOCUSATE SODIUM 100 MG: 100 CAPSULE, LIQUID FILLED ORAL at 08:20

## 2019-01-19 RX ADMIN — METOPROLOL TARTRATE 50 MG: 50 TABLET, FILM COATED ORAL at 08:20

## 2019-01-19 RX ADMIN — GABAPENTIN 600 MG: 600 TABLET, FILM COATED ORAL at 21:30

## 2019-01-19 RX ADMIN — GABAPENTIN 600 MG: 600 TABLET, FILM COATED ORAL at 14:11

## 2019-01-19 RX ADMIN — METOPROLOL TARTRATE 50 MG: 50 TABLET, FILM COATED ORAL at 21:30

## 2019-01-19 RX ADMIN — METFORMIN HYDROCHLORIDE 500 MG: 500 TABLET ORAL at 08:20

## 2019-01-19 RX ADMIN — OXYCODONE AND ACETAMINOPHEN 2 TABLET: 5; 325 TABLET ORAL at 21:30

## 2019-01-19 RX ADMIN — OXYCODONE AND ACETAMINOPHEN 2 TABLET: 5; 325 TABLET ORAL at 04:17

## 2019-01-19 RX ADMIN — PANTOPRAZOLE SODIUM 40 MG: 40 TABLET, DELAYED RELEASE ORAL at 08:20

## 2019-01-19 RX ADMIN — METFORMIN HYDROCHLORIDE 500 MG: 500 TABLET ORAL at 16:27

## 2019-01-19 RX ADMIN — SODIUM CHLORIDE: 4.5 INJECTION, SOLUTION INTRAVENOUS at 16:27

## 2019-01-19 RX ADMIN — OXYCODONE AND ACETAMINOPHEN 2 TABLET: 5; 325 TABLET ORAL at 09:58

## 2019-01-19 ASSESSMENT — PAIN DESCRIPTION - PROGRESSION
CLINICAL_PROGRESSION: GRADUALLY IMPROVING
CLINICAL_PROGRESSION: GRADUALLY IMPROVING
CLINICAL_PROGRESSION: GRADUALLY WORSENING
CLINICAL_PROGRESSION: GRADUALLY IMPROVING

## 2019-01-19 ASSESSMENT — PAIN DESCRIPTION - PAIN TYPE
TYPE: SURGICAL PAIN

## 2019-01-19 ASSESSMENT — PAIN DESCRIPTION - LOCATION
LOCATION: BACK

## 2019-01-19 ASSESSMENT — PAIN DESCRIPTION - DESCRIPTORS
DESCRIPTORS: THROBBING
DESCRIPTORS: ACHING;BURNING;SORE
DESCRIPTORS: THROBBING

## 2019-01-19 ASSESSMENT — PAIN DESCRIPTION - ONSET
ONSET: ON-GOING

## 2019-01-19 ASSESSMENT — PAIN DESCRIPTION - ORIENTATION
ORIENTATION: LOWER
ORIENTATION: MID;LOWER
ORIENTATION: LOWER

## 2019-01-19 ASSESSMENT — PAIN SCALES - GENERAL
PAINLEVEL_OUTOF10: 7
PAINLEVEL_OUTOF10: 4
PAINLEVEL_OUTOF10: 2
PAINLEVEL_OUTOF10: 7
PAINLEVEL_OUTOF10: 3

## 2019-01-19 ASSESSMENT — PAIN DESCRIPTION - FREQUENCY
FREQUENCY: CONTINUOUS

## 2019-01-19 ASSESSMENT — PAIN DESCRIPTION - DIRECTION: RADIATING_TOWARDS: LLE

## 2019-01-20 LAB
ANION GAP SERPL CALCULATED.3IONS-SCNC: 7 MEQ/L (ref 8–16)
AVERAGE GLUCOSE: 144 MG/DL (ref 70–126)
BUN BLDV-MCNC: 9 MG/DL (ref 7–22)
CALCIUM SERPL-MCNC: 8.5 MG/DL (ref 8.5–10.5)
CHLORIDE BLD-SCNC: 102 MEQ/L (ref 98–111)
CO2: 29 MEQ/L (ref 23–33)
CREAT SERPL-MCNC: 0.6 MG/DL (ref 0.4–1.2)
GFR SERPL CREATININE-BSD FRML MDRD: > 90 ML/MIN/1.73M2
GLUCOSE BLD-MCNC: 102 MG/DL (ref 70–108)
GLUCOSE BLD-MCNC: 105 MG/DL (ref 70–108)
GLUCOSE BLD-MCNC: 114 MG/DL (ref 70–108)
GLUCOSE BLD-MCNC: 123 MG/DL (ref 70–108)
GLUCOSE BLD-MCNC: 125 MG/DL (ref 70–108)
HBA1C MFR BLD: 6.8 % (ref 4.4–6.4)
HCT VFR BLD CALC: 31.9 % (ref 37–47)
HEMOGLOBIN: 9.6 GM/DL (ref 12–16)
POTASSIUM SERPL-SCNC: 4.5 MEQ/L (ref 3.5–5.2)
SODIUM BLD-SCNC: 138 MEQ/L (ref 135–145)

## 2019-01-20 PROCEDURE — 80048 BASIC METABOLIC PNL TOTAL CA: CPT

## 2019-01-20 PROCEDURE — 85014 HEMATOCRIT: CPT

## 2019-01-20 PROCEDURE — 85018 HEMOGLOBIN: CPT

## 2019-01-20 PROCEDURE — 6370000000 HC RX 637 (ALT 250 FOR IP): Performed by: INTERNAL MEDICINE

## 2019-01-20 PROCEDURE — 2580000003 HC RX 258: Performed by: PHYSICIAN ASSISTANT

## 2019-01-20 PROCEDURE — 1200000000 HC SEMI PRIVATE

## 2019-01-20 PROCEDURE — 36415 COLL VENOUS BLD VENIPUNCTURE: CPT

## 2019-01-20 PROCEDURE — 2580000003 HC RX 258: Performed by: INTERNAL MEDICINE

## 2019-01-20 PROCEDURE — 6370000000 HC RX 637 (ALT 250 FOR IP): Performed by: PHYSICIAN ASSISTANT

## 2019-01-20 PROCEDURE — 82948 REAGENT STRIP/BLOOD GLUCOSE: CPT

## 2019-01-20 PROCEDURE — 83036 HEMOGLOBIN GLYCOSYLATED A1C: CPT

## 2019-01-20 RX ORDER — TRAMADOL HYDROCHLORIDE 50 MG/1
50 TABLET ORAL EVERY 4 HOURS PRN
Qty: 42 TABLET | Refills: 0 | Status: SHIPPED | OUTPATIENT
Start: 2019-01-20 | End: 2019-01-27

## 2019-01-20 RX ORDER — OXYCODONE HYDROCHLORIDE AND ACETAMINOPHEN 5; 325 MG/1; MG/1
1 TABLET ORAL EVERY 6 HOURS PRN
Qty: 28 TABLET | Refills: 0 | Status: SHIPPED | OUTPATIENT
Start: 2019-01-20 | End: 2019-01-27

## 2019-01-20 RX ADMIN — OXYCODONE AND ACETAMINOPHEN 1 TABLET: 5; 325 TABLET ORAL at 14:34

## 2019-01-20 RX ADMIN — OXYCODONE AND ACETAMINOPHEN 2 TABLET: 5; 325 TABLET ORAL at 03:16

## 2019-01-20 RX ADMIN — DOCUSATE SODIUM 100 MG: 100 CAPSULE, LIQUID FILLED ORAL at 08:41

## 2019-01-20 RX ADMIN — GABAPENTIN 600 MG: 600 TABLET, FILM COATED ORAL at 08:41

## 2019-01-20 RX ADMIN — GABAPENTIN 600 MG: 600 TABLET, FILM COATED ORAL at 14:34

## 2019-01-20 RX ADMIN — METOPROLOL TARTRATE 50 MG: 50 TABLET, FILM COATED ORAL at 20:38

## 2019-01-20 RX ADMIN — ACETAMINOPHEN 650 MG: 325 TABLET ORAL at 23:30

## 2019-01-20 RX ADMIN — Medication 10 ML: at 20:40

## 2019-01-20 RX ADMIN — SODIUM CHLORIDE: 4.5 INJECTION, SOLUTION INTRAVENOUS at 03:19

## 2019-01-20 RX ADMIN — METFORMIN HYDROCHLORIDE 500 MG: 500 TABLET ORAL at 08:41

## 2019-01-20 RX ADMIN — GABAPENTIN 600 MG: 600 TABLET, FILM COATED ORAL at 20:38

## 2019-01-20 RX ADMIN — METFORMIN HYDROCHLORIDE 500 MG: 500 TABLET ORAL at 17:13

## 2019-01-20 RX ADMIN — SODIUM CHLORIDE: 4.5 INJECTION, SOLUTION INTRAVENOUS at 11:56

## 2019-01-20 RX ADMIN — DOCUSATE SODIUM 100 MG: 100 CAPSULE, LIQUID FILLED ORAL at 20:38

## 2019-01-20 RX ADMIN — PANTOPRAZOLE SODIUM 40 MG: 40 TABLET, DELAYED RELEASE ORAL at 08:41

## 2019-01-20 ASSESSMENT — PAIN DESCRIPTION - PROGRESSION

## 2019-01-20 ASSESSMENT — PAIN DESCRIPTION - ORIENTATION
ORIENTATION: MID;LOWER

## 2019-01-20 ASSESSMENT — PAIN DESCRIPTION - LOCATION
LOCATION: BACK

## 2019-01-20 ASSESSMENT — PAIN SCALES - GENERAL
PAINLEVEL_OUTOF10: 7
PAINLEVEL_OUTOF10: 3
PAINLEVEL_OUTOF10: 3
PAINLEVEL_OUTOF10: 4
PAINLEVEL_OUTOF10: 2
PAINLEVEL_OUTOF10: 2
PAINLEVEL_OUTOF10: 3

## 2019-01-20 ASSESSMENT — PAIN DESCRIPTION - FREQUENCY
FREQUENCY: INTERMITTENT

## 2019-01-20 ASSESSMENT — PAIN DESCRIPTION - DESCRIPTORS
DESCRIPTORS: BURNING

## 2019-01-20 ASSESSMENT — PAIN DESCRIPTION - PAIN TYPE
TYPE: SURGICAL PAIN

## 2019-01-20 ASSESSMENT — PAIN DESCRIPTION - ONSET
ONSET: ON-GOING

## 2019-01-21 PROBLEM — E11.9 TYPE 2 DIABETES MELLITUS, WITHOUT LONG-TERM CURRENT USE OF INSULIN (HCC): Status: ACTIVE | Noted: 2019-01-21

## 2019-01-21 LAB
ANION GAP SERPL CALCULATED.3IONS-SCNC: 9 MEQ/L (ref 8–16)
BUN BLDV-MCNC: 6 MG/DL (ref 7–22)
CALCIUM SERPL-MCNC: 8.3 MG/DL (ref 8.5–10.5)
CHLORIDE BLD-SCNC: 103 MEQ/L (ref 98–111)
CO2: 31 MEQ/L (ref 23–33)
CREAT SERPL-MCNC: 0.4 MG/DL (ref 0.4–1.2)
GFR SERPL CREATININE-BSD FRML MDRD: > 90 ML/MIN/1.73M2
GLUCOSE BLD-MCNC: 106 MG/DL (ref 70–108)
GLUCOSE BLD-MCNC: 106 MG/DL (ref 70–108)
GLUCOSE BLD-MCNC: 110 MG/DL (ref 70–108)
GLUCOSE BLD-MCNC: 129 MG/DL (ref 70–108)
GLUCOSE BLD-MCNC: 130 MG/DL (ref 70–108)
HCT VFR BLD CALC: 31.1 % (ref 37–47)
HEMOGLOBIN: 9.7 GM/DL (ref 12–16)
POTASSIUM SERPL-SCNC: 4.2 MEQ/L (ref 3.5–5.2)
SODIUM BLD-SCNC: 143 MEQ/L (ref 135–145)

## 2019-01-21 PROCEDURE — 36415 COLL VENOUS BLD VENIPUNCTURE: CPT

## 2019-01-21 PROCEDURE — 80048 BASIC METABOLIC PNL TOTAL CA: CPT

## 2019-01-21 PROCEDURE — 97110 THERAPEUTIC EXERCISES: CPT

## 2019-01-21 PROCEDURE — 85018 HEMOGLOBIN: CPT

## 2019-01-21 PROCEDURE — 97116 GAIT TRAINING THERAPY: CPT

## 2019-01-21 PROCEDURE — 6370000000 HC RX 637 (ALT 250 FOR IP): Performed by: INTERNAL MEDICINE

## 2019-01-21 PROCEDURE — 85014 HEMATOCRIT: CPT

## 2019-01-21 PROCEDURE — 2580000003 HC RX 258: Performed by: PHYSICIAN ASSISTANT

## 2019-01-21 PROCEDURE — 6370000000 HC RX 637 (ALT 250 FOR IP): Performed by: PHYSICIAN ASSISTANT

## 2019-01-21 PROCEDURE — 82948 REAGENT STRIP/BLOOD GLUCOSE: CPT

## 2019-01-21 PROCEDURE — 1200000000 HC SEMI PRIVATE

## 2019-01-21 PROCEDURE — 97530 THERAPEUTIC ACTIVITIES: CPT

## 2019-01-21 RX ORDER — CYCLOBENZAPRINE HCL 10 MG
10 TABLET ORAL 3 TIMES DAILY PRN
Qty: 90 TABLET | Refills: 0 | Status: SHIPPED | OUTPATIENT
Start: 2019-01-21 | End: 2019-01-31

## 2019-01-21 RX ADMIN — METFORMIN HYDROCHLORIDE 500 MG: 500 TABLET ORAL at 16:35

## 2019-01-21 RX ADMIN — METFORMIN HYDROCHLORIDE 500 MG: 500 TABLET ORAL at 08:28

## 2019-01-21 RX ADMIN — METOPROLOL TARTRATE 50 MG: 50 TABLET, FILM COATED ORAL at 08:27

## 2019-01-21 RX ADMIN — OXYCODONE AND ACETAMINOPHEN 2 TABLET: 5; 325 TABLET ORAL at 08:27

## 2019-01-21 RX ADMIN — DOCUSATE SODIUM 100 MG: 100 CAPSULE, LIQUID FILLED ORAL at 22:09

## 2019-01-21 RX ADMIN — Medication 10 ML: at 08:28

## 2019-01-21 RX ADMIN — GABAPENTIN 600 MG: 600 TABLET, FILM COATED ORAL at 16:34

## 2019-01-21 RX ADMIN — Medication 10 ML: at 22:09

## 2019-01-21 RX ADMIN — GABAPENTIN 600 MG: 600 TABLET, FILM COATED ORAL at 22:09

## 2019-01-21 RX ADMIN — METOPROLOL TARTRATE 50 MG: 50 TABLET, FILM COATED ORAL at 22:09

## 2019-01-21 RX ADMIN — DOCUSATE SODIUM 100 MG: 100 CAPSULE, LIQUID FILLED ORAL at 08:26

## 2019-01-21 RX ADMIN — GABAPENTIN 600 MG: 600 TABLET, FILM COATED ORAL at 08:27

## 2019-01-21 RX ADMIN — PANTOPRAZOLE SODIUM 40 MG: 40 TABLET, DELAYED RELEASE ORAL at 08:26

## 2019-01-21 ASSESSMENT — PAIN DESCRIPTION - LOCATION
LOCATION: BACK

## 2019-01-21 ASSESSMENT — PAIN DESCRIPTION - FREQUENCY: FREQUENCY: INTERMITTENT

## 2019-01-21 ASSESSMENT — PAIN SCALES - GENERAL
PAINLEVEL_OUTOF10: 4
PAINLEVEL_OUTOF10: 3
PAINLEVEL_OUTOF10: 3
PAINLEVEL_OUTOF10: 4
PAINLEVEL_OUTOF10: 7
PAINLEVEL_OUTOF10: 2
PAINLEVEL_OUTOF10: 4

## 2019-01-21 ASSESSMENT — PAIN DESCRIPTION - DESCRIPTORS
DESCRIPTORS: DISCOMFORT
DESCRIPTORS: BURNING

## 2019-01-21 ASSESSMENT — PAIN DESCRIPTION - PROGRESSION
CLINICAL_PROGRESSION: GRADUALLY IMPROVING
CLINICAL_PROGRESSION: GRADUALLY IMPROVING

## 2019-01-21 ASSESSMENT — PAIN DESCRIPTION - ONSET: ONSET: ON-GOING

## 2019-01-21 ASSESSMENT — PAIN DESCRIPTION - PAIN TYPE
TYPE: SURGICAL PAIN

## 2019-01-21 ASSESSMENT — PAIN DESCRIPTION - ORIENTATION: ORIENTATION: MID;LOWER

## 2019-01-22 VITALS
DIASTOLIC BLOOD PRESSURE: 59 MMHG | WEIGHT: 188.8 LBS | SYSTOLIC BLOOD PRESSURE: 121 MMHG | TEMPERATURE: 99 F | OXYGEN SATURATION: 96 % | RESPIRATION RATE: 18 BRPM | HEART RATE: 82 BPM | BODY MASS INDEX: 27.03 KG/M2 | HEIGHT: 70 IN

## 2019-01-22 LAB
ANION GAP SERPL CALCULATED.3IONS-SCNC: 8 MEQ/L (ref 8–16)
BUN BLDV-MCNC: 5 MG/DL (ref 7–22)
CALCIUM SERPL-MCNC: 8.5 MG/DL (ref 8.5–10.5)
CHLORIDE BLD-SCNC: 103 MEQ/L (ref 98–111)
CO2: 32 MEQ/L (ref 23–33)
CREAT SERPL-MCNC: 0.4 MG/DL (ref 0.4–1.2)
GFR SERPL CREATININE-BSD FRML MDRD: > 90 ML/MIN/1.73M2
GLUCOSE BLD-MCNC: 119 MG/DL (ref 70–108)
GLUCOSE BLD-MCNC: 140 MG/DL (ref 70–108)
GLUCOSE BLD-MCNC: 140 MG/DL (ref 70–108)
HCT VFR BLD CALC: 29.7 % (ref 37–47)
HEMOGLOBIN: 9.2 GM/DL (ref 12–16)
POTASSIUM SERPL-SCNC: 4.1 MEQ/L (ref 3.5–5.2)
SODIUM BLD-SCNC: 143 MEQ/L (ref 135–145)

## 2019-01-22 PROCEDURE — 85014 HEMATOCRIT: CPT

## 2019-01-22 PROCEDURE — 85018 HEMOGLOBIN: CPT

## 2019-01-22 PROCEDURE — 97530 THERAPEUTIC ACTIVITIES: CPT

## 2019-01-22 PROCEDURE — 6370000000 HC RX 637 (ALT 250 FOR IP): Performed by: PHYSICIAN ASSISTANT

## 2019-01-22 PROCEDURE — 80048 BASIC METABOLIC PNL TOTAL CA: CPT

## 2019-01-22 PROCEDURE — 2580000003 HC RX 258: Performed by: PHYSICIAN ASSISTANT

## 2019-01-22 PROCEDURE — 6370000000 HC RX 637 (ALT 250 FOR IP): Performed by: INTERNAL MEDICINE

## 2019-01-22 PROCEDURE — 36415 COLL VENOUS BLD VENIPUNCTURE: CPT

## 2019-01-22 PROCEDURE — 97535 SELF CARE MNGMENT TRAINING: CPT

## 2019-01-22 PROCEDURE — 82948 REAGENT STRIP/BLOOD GLUCOSE: CPT

## 2019-01-22 PROCEDURE — 97116 GAIT TRAINING THERAPY: CPT

## 2019-01-22 RX ADMIN — GABAPENTIN 600 MG: 600 TABLET, FILM COATED ORAL at 08:29

## 2019-01-22 RX ADMIN — DOCUSATE SODIUM 100 MG: 100 CAPSULE, LIQUID FILLED ORAL at 08:33

## 2019-01-22 RX ADMIN — MAGNESIUM HYDROXIDE 30 ML: 400 SUSPENSION ORAL at 08:33

## 2019-01-22 RX ADMIN — OXYCODONE AND ACETAMINOPHEN 2 TABLET: 5; 325 TABLET ORAL at 01:24

## 2019-01-22 RX ADMIN — METFORMIN HYDROCHLORIDE 500 MG: 500 TABLET ORAL at 08:29

## 2019-01-22 RX ADMIN — Medication 10 ML: at 08:30

## 2019-01-22 RX ADMIN — GABAPENTIN 600 MG: 600 TABLET, FILM COATED ORAL at 14:02

## 2019-01-22 RX ADMIN — PANTOPRAZOLE SODIUM 40 MG: 40 TABLET, DELAYED RELEASE ORAL at 08:33

## 2019-01-22 RX ADMIN — METOPROLOL TARTRATE 50 MG: 50 TABLET, FILM COATED ORAL at 08:29

## 2019-01-22 ASSESSMENT — PAIN SCALES - GENERAL
PAINLEVEL_OUTOF10: 3
PAINLEVEL_OUTOF10: 3
PAINLEVEL_OUTOF10: 4
PAINLEVEL_OUTOF10: 4
PAINLEVEL_OUTOF10: 3
PAINLEVEL_OUTOF10: 4

## 2019-01-22 ASSESSMENT — PAIN DESCRIPTION - PAIN TYPE
TYPE: SURGICAL PAIN
TYPE: SURGICAL PAIN

## 2019-01-22 ASSESSMENT — PAIN DESCRIPTION - DESCRIPTORS: DESCRIPTORS: DISCOMFORT

## 2019-01-22 ASSESSMENT — PAIN DESCRIPTION - LOCATION
LOCATION: BACK

## 2019-01-22 ASSESSMENT — PAIN DESCRIPTION - ORIENTATION: ORIENTATION: LOWER;MID

## 2019-07-31 ENCOUNTER — HOSPITAL ENCOUNTER (OUTPATIENT)
Age: 69
Setting detail: SPECIMEN
Discharge: HOME | End: 2019-07-31
Payer: MEDICARE

## 2019-08-06 LAB — SURGICAL PATHOLOGY REPORT: NORMAL

## 2021-03-16 ENCOUNTER — HOSPITAL ENCOUNTER (OUTPATIENT)
Age: 71
Discharge: HOME | End: 2021-03-18
Payer: MEDICARE

## 2021-03-16 ENCOUNTER — OFFICE VISIT (OUTPATIENT)
Dept: FAMILY MEDICINE CLINIC | Age: 71
End: 2021-03-16
Payer: COMMERCIAL

## 2021-03-16 ENCOUNTER — HOSPITAL ENCOUNTER (OUTPATIENT)
Dept: GENERAL RADIOLOGY | Age: 71
Discharge: HOME | End: 2021-03-18
Payer: MEDICARE

## 2021-03-16 ENCOUNTER — HOSPITAL ENCOUNTER (OUTPATIENT)
Age: 71
Discharge: HOME | End: 2021-03-16
Payer: MEDICARE

## 2021-03-16 VITALS
SYSTOLIC BLOOD PRESSURE: 128 MMHG | BODY MASS INDEX: 28.14 KG/M2 | HEART RATE: 65 BPM | TEMPERATURE: 99.1 F | DIASTOLIC BLOOD PRESSURE: 78 MMHG | OXYGEN SATURATION: 97 % | HEIGHT: 69 IN | WEIGHT: 190 LBS

## 2021-03-16 DIAGNOSIS — R05.9 COUGH: ICD-10-CM

## 2021-03-16 DIAGNOSIS — J40 BRONCHITIS: Primary | ICD-10-CM

## 2021-03-16 DIAGNOSIS — R07.89 CHEST HEAVINESS: ICD-10-CM

## 2021-03-16 PROBLEM — Z96.649 HISTORY OF TOTAL HIP REPLACEMENT: Status: ACTIVE | Noted: 2021-03-16

## 2021-03-16 PROBLEM — G62.9 NEUROPATHY: Status: ACTIVE | Noted: 2021-03-16

## 2021-03-16 PROBLEM — S02.5XXA FRACTURE OF TOOTH: Status: ACTIVE | Noted: 2021-03-16

## 2021-03-16 PROBLEM — M47.816 SPONDYLOSIS OF LUMBAR SPINE: Status: ACTIVE | Noted: 2021-03-16

## 2021-03-16 PROBLEM — M51.26 HERNIATED LUMBAR INTERVERTEBRAL DISC: Status: ACTIVE | Noted: 2021-03-16

## 2021-03-16 PROBLEM — M47.814 OSTEOARTHRITIS OF THORACIC SPINE: Status: ACTIVE | Noted: 2021-03-16

## 2021-03-16 PROBLEM — H60.8X2: Status: ACTIVE | Noted: 2020-06-11

## 2021-03-16 PROBLEM — Z79.891 LONG TERM PRESCRIPTION OPIATE USE: Status: ACTIVE | Noted: 2021-03-16

## 2021-03-16 PROBLEM — M54.50 LOW BACK PAIN: Status: ACTIVE | Noted: 2021-03-16

## 2021-03-16 PROBLEM — M19.90 OSTEOARTHRITIS: Status: ACTIVE | Noted: 2021-03-16

## 2021-03-16 PROBLEM — E66.9 OBESITY: Status: ACTIVE | Noted: 2021-03-16

## 2021-03-16 LAB
EKG ATRIAL RATE: 57 BPM
EKG P AXIS: -22 DEGREES
EKG P-R INTERVAL: 164 MS
EKG Q-T INTERVAL: 458 MS
EKG QRS DURATION: 84 MS
EKG QTC CALCULATION (BAZETT): 445 MS
EKG R AXIS: 32 DEGREES
EKG T AXIS: 23 DEGREES
EKG VENTRICULAR RATE: 57 BPM

## 2021-03-16 PROCEDURE — 71046 X-RAY EXAM CHEST 2 VIEWS: CPT

## 2021-03-16 PROCEDURE — 99214 OFFICE O/P EST MOD 30 MIN: CPT | Performed by: NURSE PRACTITIONER

## 2021-03-16 PROCEDURE — 93005 ELECTROCARDIOGRAM TRACING: CPT

## 2021-03-16 RX ORDER — GABAPENTIN 800 MG/1
800 TABLET ORAL 2 TIMES DAILY
COMMUNITY
Start: 2018-12-14 | End: 2021-06-10 | Stop reason: SDUPTHER

## 2021-03-16 RX ORDER — ALBUTEROL SULFATE 90 UG/1
2 AEROSOL, METERED RESPIRATORY (INHALATION) 4 TIMES DAILY PRN
Qty: 1 INHALER | Refills: 2 | Status: SHIPPED | OUTPATIENT
Start: 2021-03-16 | End: 2022-08-17 | Stop reason: SDUPTHER

## 2021-03-16 RX ORDER — MELOXICAM 15 MG/1
15 TABLET ORAL DAILY
COMMUNITY
Start: 2016-11-30 | End: 2021-04-19 | Stop reason: SDUPTHER

## 2021-03-16 RX ORDER — B-COMPLEX WITH VITAMIN C
1 TABLET ORAL 2 TIMES DAILY WITH MEALS
COMMUNITY

## 2021-03-16 RX ORDER — UBIDECARENONE 75 MG
1000 CAPSULE ORAL
COMMUNITY
Start: 2017-03-29

## 2021-03-16 RX ORDER — ASPIRIN 81 MG/1
81 TABLET ORAL DAILY
COMMUNITY

## 2021-03-16 RX ORDER — GUAIFENESIN 600 MG/1
600 TABLET, EXTENDED RELEASE ORAL 2 TIMES DAILY
Qty: 30 TABLET | Refills: 0 | Status: SHIPPED | OUTPATIENT
Start: 2021-03-16 | End: 2021-03-31

## 2021-03-16 SDOH — ECONOMIC STABILITY: FOOD INSECURITY: WITHIN THE PAST 12 MONTHS, YOU WORRIED THAT YOUR FOOD WOULD RUN OUT BEFORE YOU GOT MONEY TO BUY MORE.: NEVER TRUE

## 2021-03-16 SDOH — ECONOMIC STABILITY: TRANSPORTATION INSECURITY
IN THE PAST 12 MONTHS, HAS THE LACK OF TRANSPORTATION KEPT YOU FROM MEDICAL APPOINTMENTS OR FROM GETTING MEDICATIONS?: NO

## 2021-03-16 SDOH — ECONOMIC STABILITY: INCOME INSECURITY: HOW HARD IS IT FOR YOU TO PAY FOR THE VERY BASICS LIKE FOOD, HOUSING, MEDICAL CARE, AND HEATING?: NOT HARD AT ALL

## 2021-03-16 SDOH — ECONOMIC STABILITY: FOOD INSECURITY: WITHIN THE PAST 12 MONTHS, THE FOOD YOU BOUGHT JUST DIDN'T LAST AND YOU DIDN'T HAVE MONEY TO GET MORE.: NEVER TRUE

## 2021-03-16 SDOH — ECONOMIC STABILITY: TRANSPORTATION INSECURITY
IN THE PAST 12 MONTHS, HAS LACK OF TRANSPORTATION KEPT YOU FROM MEETINGS, WORK, OR FROM GETTING THINGS NEEDED FOR DAILY LIVING?: NO

## 2021-03-16 ASSESSMENT — PATIENT HEALTH QUESTIONNAIRE - PHQ9
SUM OF ALL RESPONSES TO PHQ9 QUESTIONS 1 & 2: 0
1. LITTLE INTEREST OR PLEASURE IN DOING THINGS: 0

## 2021-03-16 ASSESSMENT — ENCOUNTER SYMPTOMS
COUGH: 1
SHORTNESS OF BREATH: 1

## 2021-03-16 NOTE — PATIENT INSTRUCTIONS
SURVEY:    You may be receiving a survey from [facility] regarding your visit today. Please complete the survey to enable us to provide the highest quality of care to you and your family. If you cannot score us a very good on any question, please call the office to discuss how we could have made your experience a very good one. Thank you.

## 2021-03-16 NOTE — PROGRESS NOTES
Name: Dany Gomez  : 1950         Chief Complaint:     Chief Complaint   Patient presents with    Cough     Started about 3 weeks. Mucus comes up when coughing.  Congestion    Pain     across middle of back    Fatigue     very weak and tired. History of Present Illness:      Dany Gomez is a 79 y.o.  female who presents with Cough (Started about 3 weeks. Mucus comes up when coughing. ), Congestion, Pain (across middle of back), and Fatigue (very weak and tired. )      HPI   The patient presents with symptoms of cough, fatigue, chest congestion, and back pain that began 3 weeks ago. She is a new patient today. Cough is productive, color of mucus unknown. She admits that her chest feels heavy but denies chest pain or palpitations. Denies pain up her neck or down her arm but admits left shoulder pain. Denies diaphoresis. Admits intermittent shortness of breath. She admits feeling weak and achy. She admits falling into the tub 3 weeks ago which has worsened mid back pain. Admits slight fever last week as well as chills but both have since resolved. Denies past medical history of lung or cardiac diagnoses. She has used DayQuil and NyQuil with relief. She is a 1 PPD smoker x50 years.     Past Medical History:     Past Medical History:   Diagnosis Date    Arthritis     Crespo's esophagus     Diabetes mellitus ([facility] [address]     Hypertension       Reviewed all health maintenance requirements and ordered appropriate tests  Health Maintenance Due   Topic Date Due    Hepatitis C screen  Never done    Diabetic foot exam  Never done    Diabetic retinal exam  Never done    Lipid screen  Never done    COVID-19 Vaccine (1) Never done    Diabetic microalbuminuria test  Never done    DTaP/Tdap/Td vaccine (1 - Tdap) Never done    Breast cancer screen  Never done    Shingles Vaccine (1 of 2) Never done    Colon cancer screen colonoscopy  Never done    DEXA (modify frequency per [facility] score)  Never done    Low dose CT lung screening  Never done    Pneumococcal 65+ years Vaccine (1 of 1 - PPSV23) Never done   [employer] Visit (AWV)  Never done    A1C test (Diabetic or Prediabetic)  01/20/2020    Potassium monitoring  01/22/2020    Creatinine monitoring  01/22/2020    Flu vaccine (1) 09/01/2020       Past Surgical History:     Past Surgical History:   Procedure Laterality Date    APPENDECTOMY  1969    BACK SURGERY  1994    upper disc    BACK SURGERY  2019    protruded disc    CHOLECYSTECTOMY      1988    COLONOSCOPY      CYST REMOVAL  1965    pilonidal    ENDOSCOPY, COLON, DIAGNOSTIC      GASTRIC FUNDOPLICATION  [address] [place] [address] Dr    still has ovaries    JOINT REPLACEMENT Left 2015    HIP    JOINT REPLACEMENT Right     HIP    KNEE ARTHROSCOPY Bilateral 2000    LUMBAR FUSION N/A 01/18/2019    HARDWARE REMOVAL L4-S1, L3-4 DECOMPRESSION AND FUSION EXTENSION TO L3 performed by Karla Sandy MD at [facility] Right 06/09/2016    TOTAL KNEE ARTHROPLASTY Right 2002    TOTAL KNEE ARTHROPLASTY Left 2010    VASCULAR SURGERY Bilateral 2006    LEG        Medications:       Prior to Admission medications    Medication Sig Start Date End Date Taking? Authorizing Provider   vitamin B-12 (CYANOCOBALAMIN) 100 MCG tablet 1,000 mcg  3/29/17  Yes Historical Provider, MD   Calcium Carbonate-Vitamin D (OYSTER SHELL CALCIUM/D) 500-200 MG-UNIT TABS Take 1 tablet by mouth 2 times daily (with meals)   Yes Historical Provider, MD   aspirin 81 MG EC tablet Take 81 mg by mouth daily   Yes Historical Provider, MD   gabapentin (NEURONTIN) 800 MG tablet Take 800 mg by mouth 2 times daily.  12/14/18  Yes Historical Provider, MD   meloxicam (MOBIC) 15 MG tablet Take 15 mg by mouth daily 11/30/16  Yes Historical Provider, MD   albuterol sulfate HFA (VENTOLIN HFA) 108 (90 Base) MCG/ACT inhaler Inhale 2 puffs into the lungs 4 times daily as needed for Wheezing 3/16/21 Yes Safford RANDEE MuroN - CNP   guaiFENesin (MUCINEX) 600 MG extended release tablet Take 1 tablet by mouth 2 times daily for 15 days 3/16/21 3/31/21 Yes Safford RANDEE MuroN - CNP   alendronate (FOSAMAX) 35 MG tablet Take 35 mg by mouth every 7 days On Thursdays   Yes Historical Provider, MD   metFORMIN (GLUCOPHAGE) 500 MG tablet Take 500 mg by mouth 2 times daily (with meals)   Yes Historical Provider, MD   metoprolol (LOPRESSOR) 50 MG tablet Take 50 mg by mouth 2 times daily   Yes Historical Provider, MD        Allergies:       Penicillins    Social History:     Tobacco:    reports that she has been smoking cigarettes. She has a 50.00 pack-year smoking history. She has never used smokeless tobacco.  Alcohol:      reports current alcohol use. Drug Use:  reports no history of drug use. Family History:     Family History   Problem Relation Age of Onset    Diabetes Mother     Heart Disease Mother     Cancer Father     Diabetes Brother     Cancer Brother     High Blood Pressure Sister        Review of Systems:     Positive and Negative as described in HPI    Review of Systems   Constitutional: Negative for chills and fever. HENT: Positive for congestion. Respiratory: Positive for cough and shortness of breath. Cardiovascular: Negative for chest pain and palpitations. Physical Exam:   Vitals:  /78   Pulse 65   Temp 99.1 °F (37.3 °C)   Ht 5' 9\" (1.753 m)   Wt 190 lb (86.2 kg)   SpO2 97%    L/min Comment: ect: 362  BMI 28.06 kg/m²     Physical Exam  Constitutional:       General: She is not in acute distress. Appearance: Normal appearance. She is normal weight. She is not ill-appearing or toxic-appearing. HENT:      Head: Normocephalic. Right Ear: Tympanic membrane, ear canal and external ear normal. There is no impacted cerumen. Left Ear: Tympanic membrane, ear canal and external ear normal. There is no impacted cerumen.       Nose: Nose normal. No congestion or rhinorrhea. Mouth/Throat:      Mouth: Mucous membranes are moist.      Pharynx: No oropharyngeal exudate or posterior oropharyngeal erythema. Cardiovascular:      Rate and Rhythm: Normal rate and regular rhythm. Heart sounds: Normal heart sounds. No murmur. Pulmonary:      Effort: Pulmonary effort is normal. No respiratory distress. Breath sounds: Normal breath sounds. No stridor. No wheezing, rhonchi or rales. Skin:     General: Skin is warm. Neurological:      Mental Status: She is alert and oriented to person, place, and time. Psychiatric:         Mood and Affect: Mood normal.         Behavior: Behavior normal.         Thought Content: Thought content normal.         Judgment: Judgment normal.         Data:     Lab Results   Component Value Date     01/22/2019    K 4.1 01/22/2019     01/22/2019    CO2 32 01/22/2019    BUN 5 01/22/2019    CREATININE 0.4 01/22/2019    GLUCOSE 140 01/22/2019     Lab Results   Component Value Date    WBC 6.9 01/03/2019    RBC 5.48 01/03/2019    HGB 9.2 01/22/2019    HCT 29.7 01/22/2019    MCV 89.1 01/03/2019    MCH 27.4 01/03/2019    MCHC 30.7 01/03/2019     01/03/2019    MPV 10.8 01/03/2019     No results found for: TSH  Lab Results   Component Value Date    LABA1C 6.8 01/20/2019       Assessment/Plan:      Diagnosis Orders   1. Bronchitis     2. Cough  XR CHEST STANDARD (2 VW)    EKG 12 lead   3. Chest heaviness  EKG 12 lead       -Vitals stable. -Peak flow abnormal today. Order for albuterol inhaler placed to assist with shortness of breath. -EKG completed 1/2019 WNL. Will repeat due to complaints of chest heaviness, mid back pain, and fatigue. Stat EKG completed and showed sinus bradycardia with no ischemic changes.  -Due to history of chills, fever, fatigue, mucus production, and length of symptoms, will order chest x-ray to rule out pneumonia. Results show no acute process.   EKG and x-ray results discussed with patient.  -Order for guaifenesin placed today.  -Encouraged rest and hydration.  -Continue to monitor symptoms at home and notify office if they worsen or persist.  -Reviewed signs and symptoms of worsening shortness of breath or chest pain and when to present to the emergency department  -Follow-up next week for establish care visit. Completed Refills   Requested Prescriptions     Signed Prescriptions Disp Refills    albuterol sulfate HFA (VENTOLIN HFA) 108 (90 Base) MCG/ACT inhaler 1 Inhaler 2     Sig: Inhale 2 puffs into the lungs 4 times daily as needed for Wheezing    guaiFENesin (MUCINEX) 600 MG extended release tablet 30 tablet 0     Sig: Take 1 tablet by mouth 2 times daily for 15 days       Orders Placed This Encounter   Procedures    XR CHEST STANDARD (2 VW)     Standing Status:   Future     Number of Occurrences:   1     Standing Expiration Date:   3/16/2022     Order Specific Question:   Reason for exam:     Answer:   cough    EKG 12 lead     Standing Status:   Future     Number of Occurrences:   1     Standing Expiration Date:   5/15/2021     Scheduling Instructions:      STAT     Order Specific Question:   Reason for Exam?     Answer: Atypical chest pain        No results found for this visit on 03/16/21. Return if symptoms worsen or fail to improve.     Electronically signed by MURRAY Ocampo CNP on 03/16/21 at 10:06 PM.

## 2021-03-19 ENCOUNTER — HOSPITAL ENCOUNTER (EMERGENCY)
Age: 71
Discharge: HOME | End: 2021-03-19
Attending: EMERGENCY MEDICINE
Payer: MEDICARE

## 2021-03-19 ENCOUNTER — APPOINTMENT (OUTPATIENT)
Dept: CT IMAGING | Age: 71
End: 2021-03-19
Payer: MEDICARE

## 2021-03-19 VITALS — RESPIRATION RATE: 20 BRPM | TEMPERATURE: 98 F | OXYGEN SATURATION: 100 % | HEART RATE: 73 BPM

## 2021-03-19 DIAGNOSIS — S39.012A STRAIN OF LUMBAR REGION, INITIAL ENCOUNTER: Primary | ICD-10-CM

## 2021-03-19 DIAGNOSIS — M62.838 SPASM OF MUSCLE: ICD-10-CM

## 2021-03-19 DIAGNOSIS — R93.429 ABNORMAL CT SCAN, KIDNEY: ICD-10-CM

## 2021-03-19 LAB
-: ABNORMAL
ABSOLUTE EOS #: 0.18 K/UL (ref 0–0.44)
ABSOLUTE IMMATURE GRANULOCYTE: 0.03 K/UL (ref 0–0.3)
ABSOLUTE LYMPH #: 1.51 K/UL (ref 1.1–3.7)
ABSOLUTE MONO #: 0.52 K/UL (ref 0.1–1.2)
AMORPHOUS: ABNORMAL
ANION GAP SERPL CALCULATED.3IONS-SCNC: 8 MMOL/L (ref 9–17)
BACTERIA: ABNORMAL
BASOPHILS # BLD: 0 % (ref 0–2)
BASOPHILS ABSOLUTE: 0.03 K/UL (ref 0–0.2)
BILIRUBIN URINE: NEGATIVE
BUN BLDV-MCNC: 23 MG/DL (ref 8–23)
BUN/CREAT BLD: 40 (ref 9–20)
CALCIUM SERPL-MCNC: 9.3 MG/DL (ref 8.6–10.4)
CASTS UA: ABNORMAL /LPF
CHLORIDE BLD-SCNC: 103 MMOL/L (ref 98–107)
CO2: 30 MMOL/L (ref 20–31)
COLOR: YELLOW
COMMENT UA: ABNORMAL
CREAT SERPL-MCNC: 0.58 MG/DL (ref 0.5–0.9)
CRYSTALS, UA: ABNORMAL /HPF
CRYSTALS, UA: ABNORMAL /HPF
DIFFERENTIAL TYPE: ABNORMAL
EOSINOPHILS RELATIVE PERCENT: 3 % (ref 1–4)
EPITHELIAL CELLS UA: ABNORMAL /HPF (ref 0–25)
GFR AFRICAN AMERICAN: >60 ML/MIN
GFR NON-AFRICAN AMERICAN: >60 ML/MIN
GFR SERPL CREATININE-BSD FRML MDRD: ABNORMAL ML/MIN/{1.73_M2}
GFR SERPL CREATININE-BSD FRML MDRD: ABNORMAL ML/MIN/{1.73_M2}
GLUCOSE BLD-MCNC: 146 MG/DL (ref 70–99)
GLUCOSE URINE: NEGATIVE
HCT VFR BLD CALC: 39.1 % (ref 36.3–47.1)
HEMOGLOBIN: 12.1 G/DL (ref 11.9–15.1)
IMMATURE GRANULOCYTES: 0 %
KETONES, URINE: ABNORMAL
LEUKOCYTE ESTERASE, URINE: NEGATIVE
LYMPHOCYTES # BLD: 22 % (ref 24–43)
MAGNESIUM: 1.7 MG/DL (ref 1.6–2.6)
MCH RBC QN AUTO: 27.4 PG (ref 25.2–33.5)
MCHC RBC AUTO-ENTMCNC: 30.9 G/DL (ref 28.4–34.8)
MCV RBC AUTO: 88.7 FL (ref 82.6–102.9)
MONOCYTES # BLD: 8 % (ref 3–12)
MUCUS: ABNORMAL
NITRITE, URINE: NEGATIVE
NRBC AUTOMATED: 0 PER 100 WBC
OTHER OBSERVATIONS UA: ABNORMAL
PDW BLD-RTO: 13.2 % (ref 11.8–14.4)
PH UA: 5.5 (ref 5–9)
PLATELET # BLD: 178 K/UL (ref 138–453)
PLATELET ESTIMATE: ABNORMAL
PMV BLD AUTO: 11.2 FL (ref 8.1–13.5)
POTASSIUM SERPL-SCNC: 3.4 MMOL/L (ref 3.7–5.3)
PROTEIN UA: NEGATIVE
RBC # BLD: 4.41 M/UL (ref 3.95–5.11)
RBC # BLD: ABNORMAL 10*6/UL
RBC UA: ABNORMAL /HPF (ref 0–2)
RENAL EPITHELIAL, UA: ABNORMAL /HPF
SEG NEUTROPHILS: 67 % (ref 36–65)
SEGMENTED NEUTROPHILS ABSOLUTE COUNT: 4.7 K/UL (ref 1.5–8.1)
SODIUM BLD-SCNC: 141 MMOL/L (ref 135–144)
SPECIFIC GRAVITY UA: >1.03 (ref 1.01–1.02)
TRICHOMONAS: ABNORMAL
TURBIDITY: CLEAR
URINE HGB: ABNORMAL
UROBILINOGEN, URINE: NORMAL
WBC # BLD: 7 K/UL (ref 3.5–11.3)
WBC # BLD: ABNORMAL 10*3/UL
WBC UA: ABNORMAL /HPF (ref 0–5)
YEAST: ABNORMAL

## 2021-03-19 PROCEDURE — 80048 BASIC METABOLIC PNL TOTAL CA: CPT

## 2021-03-19 PROCEDURE — 83735 ASSAY OF MAGNESIUM: CPT

## 2021-03-19 PROCEDURE — 81001 URINALYSIS AUTO W/SCOPE: CPT

## 2021-03-19 PROCEDURE — 2580000003 HC RX 258: Performed by: EMERGENCY MEDICINE

## 2021-03-19 PROCEDURE — 74176 CT ABD & PELVIS W/O CONTRAST: CPT

## 2021-03-19 PROCEDURE — 99283 EMERGENCY DEPT VISIT LOW MDM: CPT

## 2021-03-19 PROCEDURE — 6360000002 HC RX W HCPCS: Performed by: EMERGENCY MEDICINE

## 2021-03-19 PROCEDURE — 85025 COMPLETE CBC W/AUTO DIFF WBC: CPT

## 2021-03-19 PROCEDURE — 96374 THER/PROPH/DIAG INJ IV PUSH: CPT

## 2021-03-19 PROCEDURE — 36415 COLL VENOUS BLD VENIPUNCTURE: CPT

## 2021-03-19 PROCEDURE — 6370000000 HC RX 637 (ALT 250 FOR IP): Performed by: EMERGENCY MEDICINE

## 2021-03-19 RX ORDER — 0.9 % SODIUM CHLORIDE 0.9 %
1000 INTRAVENOUS SOLUTION INTRAVENOUS ONCE
Status: COMPLETED | OUTPATIENT
Start: 2021-03-19 | End: 2021-03-19

## 2021-03-19 RX ORDER — HYDROCODONE BITARTRATE AND ACETAMINOPHEN 5; 325 MG/1; MG/1
2 TABLET ORAL ONCE
Status: DISCONTINUED | OUTPATIENT
Start: 2021-03-19 | End: 2021-03-19

## 2021-03-19 RX ORDER — CYCLOBENZAPRINE HCL 10 MG
10 TABLET ORAL ONCE
Status: DISCONTINUED | OUTPATIENT
Start: 2021-03-19 | End: 2021-03-20 | Stop reason: HOSPADM

## 2021-03-19 RX ORDER — KETOROLAC TROMETHAMINE 15 MG/ML
30 INJECTION, SOLUTION INTRAMUSCULAR; INTRAVENOUS ONCE
Status: COMPLETED | OUTPATIENT
Start: 2021-03-19 | End: 2021-03-19

## 2021-03-19 RX ORDER — METHYLPREDNISOLONE 4 MG/1
TABLET ORAL
Qty: 21 TABLET | Refills: 0 | Status: SHIPPED | OUTPATIENT
Start: 2021-03-19 | End: 2021-03-25

## 2021-03-19 RX ORDER — CYCLOBENZAPRINE HCL 10 MG
10 TABLET ORAL 3 TIMES DAILY PRN
Qty: 21 TABLET | Refills: 0 | Status: SHIPPED | OUTPATIENT
Start: 2021-03-19 | End: 2021-03-29

## 2021-03-19 RX ADMIN — KETOROLAC TROMETHAMINE 30 MG: 15 INJECTION, SOLUTION INTRAMUSCULAR; INTRAVENOUS at 20:31

## 2021-03-19 RX ADMIN — SODIUM CHLORIDE 1000 ML: 9 INJECTION, SOLUTION INTRAVENOUS at 20:31

## 2021-03-19 ASSESSMENT — PAIN DESCRIPTION - LOCATION: LOCATION: BACK

## 2021-03-19 ASSESSMENT — PAIN SCALES - GENERAL: PAINLEVEL_OUTOF10: 5

## 2021-03-19 NOTE — ED PROVIDER NOTES
Review of Systems   All other systems reviewed and are negative. Except as noted above the remainder of the review of systems was reviewed and negative. PAST MEDICAL HISTORY     Past Medical History:   Diagnosis Date    Arthritis     Crespo's esophagus     [facility] mellitus ([facility] [address]     Hypertension          SURGICAL HISTORY       Past Surgical History:   Procedure Laterality Date    APPENDECTOMY  1969    BACK SURGERY  1994    upper disc    BACK SURGERY  2019    protruded disc    CHOLECYSTECTOMY      1988    COLONOSCOPY      CYST REMOVAL  1965    pilonidal    ENDOSCOPY, COLON, DIAGNOSTIC      GASTRIC FUNDOPLICATION  4279    HYSTERECTOMY  1998    still has ovaries    JOINT REPLACEMENT Left 2015    HIP    JOINT REPLACEMENT Right     HIP    KNEE ARTHROSCOPY Bilateral 2000    LUMBAR FUSION N/A 01/18/2019    HARDWARE REMOVAL L4-S1, L3-4 DECOMPRESSION AND FUSION EXTENSION TO L3 performed by Adriana Pettit MD [address] Right 06/09/2016    TOTAL KNEE ARTHROPLASTY Right 2002    TOTAL KNEE ARTHROPLASTY Left 2010    VASCULAR SURGERY Bilateral 2006    LEG         CURRENT MEDICATIONS       Discharge Medication List as of 3/19/2021  9:56 PM      CONTINUE these medications which have NOT CHANGED    Details   vitamin B-12 (CYANOCOBALAMIN) 100 MCG tablet 1,000 mcg Historical Med      Calcium Carbonate-Vitamin D (OYSTER SHELL CALCIUM/D) 500-200 MG-UNIT TABS Take 1 tablet by mouth 2 times daily (with meals)Historical Med      aspirin 81 MG EC tablet Take 81 mg by mouth dailyHistorical Med      gabapentin (NEURONTIN) 800 MG tablet Take 800 mg by mouth 2 times daily. Historical Med      meloxicam (MOBIC) 15 MG tablet Take 15 mg by mouth dailyHistorical Med      albuterol sulfate HFA (VENTOLIN HFA) 108 (90 Base) MCG/ACT inhaler Inhale 2 puffs into the lungs 4 times daily as needed for Wheezing, Disp-1 Inhaler, R-2Normal      guaiFENesin (MUCINEX) 600 MG extended release file     Forced sexual activity: Not on file   Other Topics Concern    Not on file   Social History Narrative    Not on file       SCREENINGS                        PHYSICAL EXAM    (up to 7 for level 4, 8 or more for level 5)     ED Triage Vitals [03/19/21 1946]   BP Temp Temp Source Pulse Resp SpO2 Height Weight   -- 98 °F (36.7 °C) Tympanic 73 20 100 % -- --       Physical Exam  Vitals signs and nursing note reviewed. Constitutional:       General: She is not in acute distress. Appearance: She is not toxic-appearing. HENT:      Head: Normocephalic and atraumatic. Eyes:      Extraocular Movements: Extraocular movements intact. Conjunctiva/sclera: Conjunctivae normal.   Neck:      Musculoskeletal: Normal range of motion. Cardiovascular:      Rate and Rhythm: Normal rate and regular rhythm. Pulmonary:      Effort: Pulmonary effort is normal. No respiratory distress. Breath sounds: Normal breath sounds. Abdominal:      General: There is no distension. Palpations: Abdomen is soft. Comments: Patient did have suprapubic tenderness on exam.  Abdomen is nondistended. No rebound tenderness. No guarding. Musculoskeletal: Normal range of motion. Comments: Well-healed lumbar incision from previous surgeries. Patient does have bilateral lateral lumbar muscular tenderness. No midline tenderness to palpation. Negative straight leg raise bilaterally. Skin:     General: Skin is warm and dry. Findings: No rash. Neurological:      General: No focal deficit present. Mental Status: She is alert and oriented to person, place, and time.    Psychiatric:         Mood and Affect: Mood normal.         DIAGNOSTIC RESULTS     EKG: All EKG's are interpreted by the Emergency Department Physician who either signs or Co-signs this chart in the absence of a cardiologist.        RADIOLOGY:   Non-plain film images such as CT, Ultrasound and MRI are read by the radiologist. Kalyn Corpus radiographic images are visualized and preliminarily interpreted by the emergency physician with the below findings:        Interpretation per the Radiologist below, if available at the time of this note:    CT ABDOMEN PELVIS WO CONTRAST Additional Contrast? None   Final Result   No acute inflammatory process or bowel obstruction. Bilateral nephrolithiasis. No hydronephrosis or obstructive uropathy. Indeterminate left renal lesion up to 7 cm in size. Consider dedicated renal   protocol CT further characterization on outpatient basis if this may change   patient management. ED BEDSIDE ULTRASOUND:   Performed by ED Physician - none    LABS:  Labs Reviewed   CBC WITH AUTO DIFFERENTIAL - Abnormal; Notable for the following components:       Result Value    Seg Neutrophils 67 (*)     Lymphocytes 22 (*)     All other components within normal limits   BASIC METABOLIC PANEL W/ REFLEX TO MG FOR LOW K - Abnormal; Notable for the following components:    Glucose 146 (*)     Bun/Cre Ratio 40 (*)     Potassium 3.4 (*)     Anion Gap 8 (*)     All other components within normal limits   URINE RT REFLEX TO CULTURE - Abnormal; Notable for the following components:    Ketones, Urine TRACE (*)     Specific Gravity, UA >1.030 (*)     Urine Hgb TRACE (*)     All other components within normal limits   MICROSCOPIC URINALYSIS - Abnormal; Notable for the following components:    Crystals, UA CALCIUM OXALATE (*)     Crystals, UA 10 TO 20 (*)     All other components within normal limits   MAGNESIUM       All other labs were within normal range or not returned as of this dictation.     EMERGENCY DEPARTMENT COURSE and DIFFERENTIAL DIAGNOSIS/MDM:   Vitals:    Vitals:    03/19/21 1946   Pulse: 73   Resp: 20   Temp: 98 °F (36.7 °C)   TempSrc: Tympanic   SpO2: 100%           MDM  Number of Diagnoses or Management Options  Abnormal CT scan, kidney  Spasm of muscle  Strain of lumbar region, initial encounter  Diagnosis management comments: Patient was feeling better after receiving Toradol. Laboratory studies unremarkable. Urine does show calcium oxalate crystals, hematuria, no UTI. CT results thickened for oral renal stones. No ureteral stones. Incidental finding of a 7 cm determinant left renal lesion. Results reviewed and discussed with patient. Also discussed thoughts with Dr. Nellie Rivers the patient's primary care provider. Patient did have some recurrent spasms when she went to stand. No extremity weakness. No numbness or paresthesias. She was given Norco in the ED as well as Flexeril for tonight. She was discharged home with a prescription for Medrol Dosepak and Flexeril. Patient was able to ambulate without difficulty. She instructed follow-up with her primary care physician this week but return immediately if she develops any lower extremity weakness numbness tingling bowel or bladder dysfunction or any other acute concerns. REASSESSMENT          CRITICAL CARE TIME   Total Critical Care time was  minutes, excluding separately reportable procedures. There was a high probability of clinically significant/life threatening deterioration in the patient's condition which required my urgent intervention. CONSULTS:  None    PROCEDURES:  Unless otherwise noted below, none     Procedures        FINAL IMPRESSION      1. Strain of lumbar region, initial encounter    2. Spasm of muscle    3.  Abnormal CT scan, kidney          DISPOSITION/PLAN   DISPOSITION Decision To Discharge 03/19/2021 09:52:24 PM      PATIENT REFERRED TO:  MD Antonella Clark [facility] [address] [id number] [phone]      Call Monday morning to schedule appointment      DISCHARGE MEDICATIONS:  Discharge Medication List as of 3/19/2021  9:56 PM      START taking these medications    Details   methylPREDNISolone (MEDROL, EULALIA,) 4 MG tablet Take daily as directed until complete, Disp-21 tablet, R-0Normal      cyclobenzaprine (FLEXERIL) 10 MG tablet Take 1 tablet by mouth 3 times daily as needed for Muscle spasms, Disp-21 tablet, R-0Normal           Controlled Substances Monitoring:     No flowsheet data found.     (Please note that portions of this note were completed with a voice recognition program.  Efforts were made to edit the dictations but occasionally words are mis-transcribed.)    Giovanni Barboza MD (electronically signed)  Attending Emergency Physician             Giovanni Barboza MD  03/20/21 0249

## 2021-03-21 NOTE — PROGRESS NOTES
STEPHANIE [facility]  ER doc called me to make sure she was seen for the kidney lesion

## 2021-03-23 ENCOUNTER — OFFICE VISIT (OUTPATIENT)
Dept: FAMILY MEDICINE CLINIC | Age: 71
End: 2021-03-23
Payer: MEDICARE

## 2021-03-23 VITALS
WEIGHT: 192 LBS | OXYGEN SATURATION: 99 % | BODY MASS INDEX: 28.44 KG/M2 | HEIGHT: 69 IN | HEART RATE: 47 BPM | DIASTOLIC BLOOD PRESSURE: 60 MMHG | SYSTOLIC BLOOD PRESSURE: 118 MMHG

## 2021-03-23 DIAGNOSIS — Z13.220 LIPID SCREENING: ICD-10-CM

## 2021-03-23 DIAGNOSIS — Z12.31 ENCOUNTER FOR SCREENING MAMMOGRAM FOR MALIGNANT NEOPLASM OF BREAST: ICD-10-CM

## 2021-03-23 DIAGNOSIS — Z91.81 AT HIGH RISK FOR FALLS: ICD-10-CM

## 2021-03-23 DIAGNOSIS — M81.0 OSTEOPOROSIS, UNSPECIFIED OSTEOPOROSIS TYPE, UNSPECIFIED PATHOLOGICAL FRACTURE PRESENCE: ICD-10-CM

## 2021-03-23 DIAGNOSIS — Z87.891 PERSONAL HISTORY OF TOBACCO USE: ICD-10-CM

## 2021-03-23 DIAGNOSIS — N28.9 LESION OF LEFT NATIVE KIDNEY: Primary | ICD-10-CM

## 2021-03-23 DIAGNOSIS — E11.9 TYPE 2 DIABETES MELLITUS WITHOUT COMPLICATION, WITHOUT LONG-TERM CURRENT USE OF INSULIN (HCC): ICD-10-CM

## 2021-03-23 DIAGNOSIS — I10 ESSENTIAL HYPERTENSION: ICD-10-CM

## 2021-03-23 PROCEDURE — G0296 VISIT TO DETERM LDCT ELIG: HCPCS | Performed by: NURSE PRACTITIONER

## 2021-03-23 PROCEDURE — 99214 OFFICE O/P EST MOD 30 MIN: CPT | Performed by: NURSE PRACTITIONER

## 2021-03-23 ASSESSMENT — ENCOUNTER SYMPTOMS
COUGH: 0
WHEEZING: 0
ABDOMINAL PAIN: 0
RHINORRHEA: 1
DIARRHEA: 1
SINUS PRESSURE: 0
CONSTIPATION: 0
SORE THROAT: 0
SHORTNESS OF BREATH: 0
BACK PAIN: 1
SINUS PAIN: 0

## 2021-03-23 NOTE — PROGRESS NOTES
Name: Daryle Harpin  : 1950         Chief Complaint:     Chief Complaint   Patient presents with   Gabriela Pierre Establish Care     patient comes in to est care. also ER f/u.  Pain     sharp pains in lower back. started on friday. Today is a little better but not healed.  Other     Nerve damange, knee down. seeing arleen this week. Seen mia in [place] before    GI Problem     [facility]. [place]. History of Present Illness:      Daryle Harpin is a 79 y.o.  female who presents with Establish Care (patient comes in to est care. also ER f/u. ), Pain (sharp pains in lower back. started on friday. Today is a little better but not healed. ), Other (Nerve damange, knee down. seeing weroanker this week. Seen mia in [place] before), and GI Problem ([facility]. [place]. )      HPI    Hypertension:  Current medication regimen includes metoprolol 50mg BID. She denies monitoring her blood pressure at home. She denies a well balanced diet. She denies following a low-sodium diet. For physical activity, she  cleans houses and works as a . She denies chest pain, shortness of breath, headache, vision changes, palpitations, lightheadedness, or dizziness. DM:   Diabetic diet/low carb diet compliance:  Denies. Current exercise: no regular exercise  Frequency of glucose testing at home: daily  Home blood sugar records: 101-113, fasting in the morning  Glucometer at home: yes  History of hypoglycemic episodes: no  Last eye exam: 5-6 months ago at [facility] in [place]  Last diabetic foot check: 2 years ago  Current symptoms: Denies excessive hunger, excessive thirst. Denies fatigue, numbness/tingling in hands, or major changes in weight. Denies vision changes or sores that are slow to heal.  Admits increased frequency of urination. Admits numbness in toes bilaterally. reports that she has been smoking cigarettes.  She has a 50.00 pack-year smoking history. She has never used smokeless tobacco.   Medication compliance:  compliant all of the time  Medication Therapy: metformin 500mg BID    Back Pain:   Patient was seen in the ED 3/19/21 and was given given methylprednisone and flexeril for diagnosis of strain of lumbar region. She states that yesterday her symptoms started to improve. She has a history of falling into the bathtub within the last several weeks. Pain is located \"all across the bottom of her back\". Crespo's Esophagus: Following with GI ([facility] in [place]). Admits endoscopies every 3 years. Neuropathy:   The patient admits following with neurology for neuropathy, \"history of small strokes\", and abnormal gait. She was recently seeing Dr. Hardy Casarez ([facility]) but has switched to [facility] due to insurance and is planning to see Dr. Christy Dominguez next month.     Past Medical History:     Past Medical History:   Diagnosis Date    Arthritis     Crespo's esophagus     Diabetes mellitus ([facility] [address]     Hypertension       Reviewed all health maintenance requirements and ordered appropriate tests  Health Maintenance Due   Topic Date Due    Diabetic foot exam  Never done    Diabetic retinal exam  Never done    Lipid screen  Never done    Diabetic microalbuminuria test  Never done    Breast cancer screen  Never done    Colon cancer screen colonoscopy  Never done    DEXA (modify frequency per FRAX score)  Never done    Low dose CT lung screening  Never done    A1C test (Diabetic or Prediabetic)  01/20/2020       Past Surgical History:     Past Surgical History:   Procedure Laterality Date    APPENDECTOMY  1969    BACK SURGERY  1994    upper disc    BACK SURGERY  2019    protruded disc    CHOLECYSTECTOMY      1988    COLONOSCOPY      CYST REMOVAL  1965    pilonidal    ENDOSCOPY, COLON, DIAGNOSTIC      [address]STRIC FUNDOPLICATION  [address]    still has ovaries    JOINT REPLACEMENT Left 2015    HIP    JOINT REPLACEMENT Right     HIP    KNEE ARTHROSCOPY Bilateral 2000    LUMBAR FUSION N/A 01/18/2019    HARDWARE REMOVAL L4-S1, L3-4 DECOMPRESSION AND FUSION EXTENSION TO L3 performed by Mary Carmen Fry MD at [address] Right 06/09/2016    TOTAL KNEE ARTHROPLASTY Right 2002    TOTAL KNEE ARTHROPLASTY Left 2010    VASCULAR SURGERY Bilateral 2006    LEG        Medications:       Prior to Admission medications    Medication Sig Start Date End Date Taking? Authorizing Provider   methylPREDNISolone (MEDROL, EULALIA,) 4 MG tablet Take daily as directed until complete 3/19/21 3/25/21 Yes Constanza Hernandez MD   cyclobenzaprine (FLEXERIL) 10 MG tablet Take 1 tablet by mouth 3 times daily as needed for Muscle spasms 3/19/21 3/29/21 Yes Constanza Hernandez MD   vitamin B-12 (CYANOCOBALAMIN) 100 MCG tablet 1,000 mcg  3/29/17  Yes Historical Provider, MD   Calcium Carbonate-Vitamin D (OYSTER SHELL CALCIUM/D) 500-200 MG-UNIT TABS Take 1 tablet by mouth 2 times daily (with meals)   Yes Historical Provider, MD   aspirin 81 MG EC tablet Take 81 mg by mouth daily   Yes Historical Provider, MD   gabapentin (NEURONTIN) 800 MG tablet Take 800 mg by mouth 2 times daily.  12/14/18  Yes Historical Provider, MD   meloxicam (MOBIC) 15 MG tablet Take 15 mg by mouth daily 11/30/16  Yes Historical Provider, MD   albuterol sulfate HFA (VENTOLIN HFA) 108 (90 Base) MCG/ACT inhaler Inhale 2 puffs into the lungs 4 times daily as needed for Wheezing 3/16/21  Yes MURRAY iSngh CNP   guaiFENesin ([address]) 600 MG extended release tablet Take 1 tablet by mouth 2 times daily for 15 days 3/16/21 3/31/21 Yes MURRAY Singh CNP   alendronate (FOSAMAX) 35 MG tablet Take 35 mg by mouth every 7 days On Thursdays   Yes Historical Provider, MD   metFORMIN (GLUCOPHAGE) 500 MG tablet Take 500 mg by mouth 2 times daily (with meals)   Yes Historical Provider, MD   metoprolol (LOPRESSOR) 50 MG tablet Take 50 mg by mouth 2 times daily Yes Historical Provider, MD        Allergies:       Penicillins    Social History:     Tobacco:    reports that she has been smoking cigarettes. She has a 50.00 pack-year smoking history. She has never used smokeless tobacco.  Alcohol:      reports current alcohol use. Drug Use:  reports no history of drug use. Family History:     Family History   Problem Relation Age of Onset    Diabetes Mother     Heart Disease Mother     Cancer Father     Diabetes Brother     Cancer Brother     High Blood Pressure Sister        Review of Systems:     Positive and Negative as described in HPI    Review of Systems   Constitutional: Negative for chills, fatigue, fever and unexpected weight change. HENT: Positive for rhinorrhea. Negative for congestion, sinus pressure, sinus pain and sore throat. Eyes: Negative for visual disturbance. Respiratory: Negative for cough, shortness of breath and wheezing. Cardiovascular: Negative for chest pain and palpitations. Gastrointestinal: Positive for diarrhea (\"I've had this forever, it depends on what I eat\". ). Negative for abdominal pain and constipation. Genitourinary: Negative for difficulty urinating. Musculoskeletal: Positive for back pain. Negative for arthralgias, joint swelling and myalgias. Skin: Negative for rash. Neurological: Negative for dizziness, light-headedness and headaches. Physical Exam:   Vitals:  /60   Pulse (!) 47   Ht 5' 9\" (1.753 m)   Wt 192 lb (87.1 kg)   SpO2 99%   BMI 28.35 kg/m²     Physical Exam  Constitutional:       General: She is not in acute distress. Appearance: Normal appearance. She is normal weight. She is not ill-appearing or toxic-appearing. HENT:      Head: Normocephalic. Right Ear: Tympanic membrane, ear canal and external ear normal. There is no impacted cerumen. Left Ear: Tympanic membrane, ear canal and external ear normal. There is no impacted cerumen.       Nose: Nose normal. No Assessment/Plan:      Diagnosis Orders   1. Lesion of left native kidney  CT KIDNEY W WO CONTRAST   2. Encounter for screening mammogram for malignant neoplasm of breast  MILIND DIGITAL SCREEN W OR WO CAD BILATERAL   3. Personal history of tobacco use  DC VISIT TO DISCUSS LUNG CA SCREEN W LDCT    CT Lung Screen (Annual)   4. Osteoporosis, unspecified osteoporosis type, unspecified pathological fracture presence  DEXA BONE DENSITY 2 SITES   5. Essential hypertension  ALT    AST    Basic Metabolic Panel    CBC   6. Type 2 diabetes mellitus without complication, without long-term current use of insulin (HCC)  Hemoglobin A1C    Microalbumin / Creatinine Urine Ratio   7. Lipid screening  Lipid Panel   8. At high risk for falls         Crespo's Esophagus:   Continue follow-up with GI. If in need of an updated referral to a [address] [place] [address], contact office. Wellness:  Supplied health counseling on well-balanced diet and routine exercise. Encourage low-salt diet. Patient reported WNL colonoscopy 2-3 years ago. Will request records. Admits history of osteoporosis, supplementing with vitamin D, calcium, and Fosamax. Repeat DEXA orders placed today. Received second COVID-19 vaccination yesterday. Patient doing well. Reviewed risk versus benefit of low-dose chest CT scan at length. Patient agreeable to scan. Order placed today. Mammogram placed today. DM:  A1c and urine microalbumin placed today. We will complete foot exam at upcoming appointment    Lumbar strain:  Continue Flexeril and methylprednisone. If symptoms worsen or persist, contact office. Left renal lesion:  CT abdomen pelvis in ED on 3/19/2021 showed indeterminate left renal lesion up to 7 cm in size. Discussed this finding with the patient at length today. She is agreeable for further characterization with renal CT. We discussed the potential need for nephrology involvement and she is agreeable.       Completed Refills Status:   Future     Standing Expiration Date:   3/23/2022    Hemoglobin A1C     Standing Status:   Future     Standing Expiration Date:   3/23/2022    Lipid Panel     Standing Status:   Future     Standing Expiration Date:   3/23/2022     Order Specific Question:   Is Patient Fasting?/# of Hours     Answer:   fasting    Basic Metabolic Panel     Standing Status:   Future     Standing Expiration Date:   3/23/2022    CBC     Standing Status:   Future     Standing Expiration Date:   3/23/2022    Microalbumin / Creatinine Urine Ratio     Standing Status:   Future     Standing Expiration Date:   3/23/2022    AL VISIT TO DISCUSS LUNG CA SCREEN W LDCT        No results found for this visit on 03/23/21. Return in about 3 months (around 6/23/2021), or if symptoms worsen or fail to improve, for f/u.     Electronically signed by MURRAY Mcfadden CNP on 03/23/21 at 12:18 PM.

## 2021-03-23 NOTE — PATIENT INSTRUCTIONS
SURVEY:    You may be receiving a survey from [employer] regarding your visit today. Please complete the survey to enable us to provide the highest quality of care to you and your family. If you cannot score us a very good on any question, please call the office to discuss how we could of made your experience a very good one. Thank you. What is lung cancer screening? Lung cancer screening is a way in which doctors check the lungs for early signs of cancer in people who have no symptoms of lung cancer. A low-dose CT scan uses much less radiation than a normal CT scan and shows a more detailed image of the lungs than a standard X-ray. The goal of lung cancer screening is to find cancer early, before it has a chance to grow, spread, or cause problems. One large study found that smokers who were screened with low-dose CT scans were less likely to die of lung cancer than those who were screened with standard X-ray. Below is a summary of the things you need to know regarding screening for lung cancer with low-dose computed tomography (LDCT). This is a screening program that involves routine annual screening with LDCT studies of the lung. The LDCTs are done using low-dose radiation that is not thought to increase your cancer risk. If you have other serious medical conditions (other cancers, congestive heart failure) that limit your life expectancy to less than 10 years, you should not undergo lung cancer screening with LDCT. The chance is 20%-60% that the LDCT result will show abnormalities. This would require additional testing which could include repeat imaging or even invasive procedures. Most (about 95%) of \"abnormal\" LDCT results are false in the sense that no lung cancer is ultimately found. Additionally, some (about 10%) of the cancers found would not affect your life expectancy, even if undetected and untreated.   If you are still smoking, the single most important thing that you can do to reduce your risk of dying of lung cancer is to quit. For this screening to be covered by Medicare and most other insurers, strict criteria must be met. If you do not meet these criteria, but still wish to undergo LDCT testing, you will be required to sign a waiver indicating your willingness to pay for the scan.

## 2021-03-24 DIAGNOSIS — N28.9 KIDNEY LESION: Primary | ICD-10-CM

## 2021-03-25 ENCOUNTER — HOSPITAL ENCOUNTER (OUTPATIENT)
Age: 71
Discharge: HOME | End: 2021-03-25
Payer: MEDICARE

## 2021-03-25 DIAGNOSIS — Z13.220 LIPID SCREENING: ICD-10-CM

## 2021-03-25 DIAGNOSIS — E11.9 TYPE 2 DIABETES MELLITUS WITHOUT COMPLICATION, WITHOUT LONG-TERM CURRENT USE OF INSULIN (HCC): ICD-10-CM

## 2021-03-25 DIAGNOSIS — I10 ESSENTIAL HYPERTENSION: ICD-10-CM

## 2021-03-25 LAB
ALT SERPL-CCNC: 13 U/L (ref 5–33)
ANION GAP SERPL CALCULATED.3IONS-SCNC: 7 MMOL/L (ref 9–17)
AST SERPL-CCNC: 14 U/L
BUN BLDV-MCNC: 18 MG/DL (ref 8–23)
BUN/CREAT BLD: 30 (ref 9–20)
CALCIUM SERPL-MCNC: 9.4 MG/DL (ref 8.6–10.4)
CHLORIDE BLD-SCNC: 100 MMOL/L (ref 98–107)
CHOLESTEROL/HDL RATIO: 2.1
CHOLESTEROL: 115 MG/DL
CO2: 33 MMOL/L (ref 20–31)
CREAT SERPL-MCNC: 0.6 MG/DL (ref 0.5–0.9)
CREATININE URINE: 162.6 MG/DL (ref 28–217)
GFR AFRICAN AMERICAN: >60 ML/MIN
GFR NON-AFRICAN AMERICAN: >60 ML/MIN
GFR SERPL CREATININE-BSD FRML MDRD: ABNORMAL ML/MIN/{1.73_M2}
GFR SERPL CREATININE-BSD FRML MDRD: ABNORMAL ML/MIN/{1.73_M2}
GLUCOSE BLD-MCNC: 91 MG/DL (ref 70–99)
HCT VFR BLD CALC: 47 % (ref 36.3–47.1)
HDLC SERPL-MCNC: 55 MG/DL
HEMOGLOBIN: 14.3 G/DL (ref 11.9–15.1)
LDL CHOLESTEROL: 49 MG/DL (ref 0–130)
MCH RBC QN AUTO: 27.4 PG (ref 25.2–33.5)
MCHC RBC AUTO-ENTMCNC: 30.4 G/DL (ref 28.4–34.8)
MCV RBC AUTO: 90.2 FL (ref 82.6–102.9)
MICROALBUMIN/CREAT 24H UR: 18 MG/L
MICROALBUMIN/CREAT UR-RTO: 11 MCG/MG CREAT
NRBC AUTOMATED: 0 PER 100 WBC
PDW BLD-RTO: 13.4 % (ref 11.8–14.4)
PLATELET # BLD: 240 K/UL (ref 138–453)
PMV BLD AUTO: 10.9 FL (ref 8.1–13.5)
POTASSIUM SERPL-SCNC: 4.1 MMOL/L (ref 3.7–5.3)
RBC # BLD: 5.21 M/UL (ref 3.95–5.11)
SODIUM BLD-SCNC: 140 MMOL/L (ref 135–144)
TRIGL SERPL-MCNC: 54 MG/DL
VLDLC SERPL CALC-MCNC: NORMAL MG/DL (ref 1–30)
WBC # BLD: 7.6 K/UL (ref 3.5–11.3)

## 2021-03-25 PROCEDURE — 82043 UR ALBUMIN QUANTITATIVE: CPT

## 2021-03-25 PROCEDURE — 82570 ASSAY OF URINE CREATININE: CPT

## 2021-03-25 PROCEDURE — 85027 COMPLETE CBC AUTOMATED: CPT

## 2021-03-25 PROCEDURE — 80048 BASIC METABOLIC PNL TOTAL CA: CPT

## 2021-03-25 PROCEDURE — 84460 ALANINE AMINO (ALT) (SGPT): CPT

## 2021-03-25 PROCEDURE — 83036 HEMOGLOBIN GLYCOSYLATED A1C: CPT

## 2021-03-25 PROCEDURE — 84450 TRANSFERASE (AST) (SGOT): CPT

## 2021-03-25 PROCEDURE — 36415 COLL VENOUS BLD VENIPUNCTURE: CPT

## 2021-03-25 PROCEDURE — 80061 LIPID PANEL: CPT

## 2021-03-26 LAB
ESTIMATED AVERAGE GLUCOSE: 148 MG/DL
HBA1C MFR BLD: 6.8 % (ref 4–6)

## 2021-03-30 ENCOUNTER — HOSPITAL ENCOUNTER (OUTPATIENT)
Dept: CT IMAGING | Age: 71
Discharge: HOME | End: 2021-04-01
Payer: MEDICARE

## 2021-03-30 DIAGNOSIS — N28.9 KIDNEY LESION: ICD-10-CM

## 2021-03-30 PROCEDURE — 6360000004 HC RX CONTRAST MEDICATION: Performed by: NURSE PRACTITIONER

## 2021-03-30 PROCEDURE — 74178 CT ABD&PLV WO CNTR FLWD CNTR: CPT

## 2021-03-30 RX ADMIN — IOPAMIDOL 100 ML: 755 INJECTION, SOLUTION INTRAVENOUS at 19:37

## 2021-03-31 ENCOUNTER — TELEPHONE (OUTPATIENT)
Dept: ONCOLOGY | Age: 71
End: 2021-03-31

## 2021-03-31 NOTE — LETTER
3/31/2021        [address] [facility]    Dear Nicole Clay:    Your healthcare provider has ordered a low dose CT scan of the chest for lung cancer screening. You will find enclosed, information about CT lung screening. Please review the statement of understanding, you will be asked to sign a copy of this at the time of your CT scan    If you have not already been contacted to make the appointment for your scan, please call our scheduling department at [phone]    Keep in mind that CT lung screening does not take the place of smoking cessation. If you are a current smoker, you will find enclosed smoking cessation resources. Please do not hesitate to contact me if you have any questions or concerns.     [address] [place] [address],      [id number] [place] [address] Lung Screening Program  [phone]

## 2021-04-13 ENCOUNTER — TELEPHONE (OUTPATIENT)
Dept: FAMILY MEDICINE CLINIC | Age: 71
End: 2021-04-13

## 2021-04-13 DIAGNOSIS — M85.831 OSTEOPENIA OF RIGHT FOREARM: ICD-10-CM

## 2021-04-13 PROBLEM — M85.80 OSTEOPENIA: Status: ACTIVE | Noted: 2021-04-13

## 2021-04-13 NOTE — TELEPHONE ENCOUNTER
Please notify CT lung screening showed no suspicious lung nodule or mass. There are several changes consistent with chronic findings that we will discuss further at upcoming visit. However, no concerns for malignancy at this time. DEXA bone scan shows osteopenia (low bone mass that puts her at increased risk for fractures). I recommend she supplement with 1200mg calcium and 1000 IU of vit D daily. Encourage routine physical exercise such as walking, 30 minutes daily, 5 days per week.

## 2021-04-19 RX ORDER — MELOXICAM 15 MG/1
15 TABLET ORAL DAILY
Qty: 30 TABLET | Refills: 0 | Status: SHIPPED | OUTPATIENT
Start: 2021-04-19 | End: 2021-05-21 | Stop reason: SDUPTHER

## 2021-04-19 RX ORDER — ATORVASTATIN CALCIUM 10 MG/1
10 TABLET, FILM COATED ORAL DAILY
Qty: 90 TABLET | Refills: 1 | Status: SHIPPED | OUTPATIENT
Start: 2021-04-19 | End: 2021-08-04 | Stop reason: SDUPTHER

## 2021-04-19 RX ORDER — METOPROLOL TARTRATE 50 MG/1
50 TABLET, FILM COATED ORAL 2 TIMES DAILY
Qty: 180 TABLET | Refills: 0 | Status: SHIPPED | OUTPATIENT
Start: 2021-04-19 | End: 2021-08-02

## 2021-04-19 NOTE — TELEPHONE ENCOUNTER
Rx requests from [address] [place] [address]. There is a request for atorvastatin, 10 mg daily. Pt. States that she has been on this for years.

## 2021-05-03 ENCOUNTER — HOSPITAL ENCOUNTER (OUTPATIENT)
Dept: WOMENS IMAGING | Age: 71
Discharge: HOME | End: 2021-05-05
Payer: MEDICARE

## 2021-05-03 DIAGNOSIS — Z12.31 ENCOUNTER FOR SCREENING MAMMOGRAM FOR MALIGNANT NEOPLASM OF BREAST: ICD-10-CM

## 2021-05-03 PROCEDURE — 77063 BREAST TOMOSYNTHESIS BI: CPT

## 2021-05-21 RX ORDER — MELOXICAM 15 MG/1
15 TABLET ORAL DAILY
Qty: 30 TABLET | Refills: 0 | Status: SHIPPED | OUTPATIENT
Start: 2021-05-21 | End: 2021-06-11 | Stop reason: SDUPTHER

## 2021-06-10 ENCOUNTER — OFFICE VISIT (OUTPATIENT)
Dept: NEUROLOGY | Age: 71
End: 2021-06-10
Payer: COMMERCIAL

## 2021-06-10 VITALS
DIASTOLIC BLOOD PRESSURE: 79 MMHG | SYSTOLIC BLOOD PRESSURE: 143 MMHG | TEMPERATURE: 97.4 F | RESPIRATION RATE: 18 BRPM | WEIGHT: 191.3 LBS | HEIGHT: 70 IN | BODY MASS INDEX: 27.39 KG/M2 | HEART RATE: 53 BPM

## 2021-06-10 DIAGNOSIS — E11.42 DIABETIC PERIPHERAL NEUROPATHY (HCC): Primary | ICD-10-CM

## 2021-06-10 PROCEDURE — 99202 OFFICE O/P NEW SF 15 MIN: CPT | Performed by: NEUROMUSCULOSKELETAL MEDICINE, SPORTS MEDICINE

## 2021-06-10 RX ORDER — GABAPENTIN 800 MG/1
800 TABLET ORAL 2 TIMES DAILY
Qty: 180 TABLET | Refills: 1 | Status: SHIPPED | OUTPATIENT
Start: 2021-06-10 | End: 2021-10-18 | Stop reason: SDUPTHER

## 2021-06-10 NOTE — PROGRESS NOTES
NEUROLOGY CONSULT    Patient Name:  Lucho Carrion  :   1950  Clinic Visit Date: 6/10/2021    I saw Ms. Lucho Carrion  in the neurology clinic today for neurological continuity of care of diabetic neuropathy. The patient is a 55-year-old right-handed lady with a known history of hypertension, , osteoarthritis, lumbar disc disease, diabetes for many years with diabetic peripheral neuropathy with  persistent symptoms of numbness and pain in both lower extremities, treated with gabapentin by another neurologist, in the office today for a follow-up evaluation and for continuity of care because her medical insurance has changed. She has been on gabapentin for many years with moderate relief. She  had surgery for lumbar disc disease many years ago and she continues to have occasional numbness in the left lower extremity related to lumbar disc disease. No symptoms of dizziness, fainting spells, bladder or cardiovascular symptoms. REVIEW OF SYSTEMS    Constitutional Weight changes: absent, change in appetite: absent Fatigue: absent; Fevers : absent, Any recent hospitalizations:  absent   HEENT Ears: diminished,  Visual disturbance: absent   Respiratory Shortness of breath: absent, choking:  absent, Cough: present, Snoring : absent   Cardiovascular Chest pain: absent, Leg swelling :present, palpitations : absent, fainting : absent   GI Constipation: absent, Diarrhea: present, Swallowing change: absent    Urinary frequency: present, Urinary urgency: absent, Urinary incontinence: present   Musculoskeletal Neck pain: absent, Back pain: present, Stiffness: absent, Muscle pain: absent, Joint pain: present, restless leg : present   Dermatological Hair loss: absent, Skin changes: absent   Neurological Confusion: absent, Trouble concentrating: absent, Seizures: absent;  Memory loss: present, balance problem: present, Dizziness: absent, vertigo: absent, Weakness: absent, Numbness present, Tremor: absent, Spasm: present, involuntary movement: absent, Speech difficulty: absent, Headache: absent, Light sensitivity: absent   Psychiatric Anxiety: present, Depression  absent, drug abuse: absent, Hallucination: absent, mood disorder: absent, Suicidal ideations absent   Hematologic Abnormal bleeding: absent, Anemia: absent, Lymph gland changes: absent Clotting disorder: absent     Past Medical History:   Diagnosis Date    Arthritis     Crespo's esophagus     Diabetes mellitus ([address]     Hypertension        Past Surgical History:   Procedure Laterality Date    [address]EN[address]Y  1969   [address]    upper disc    BACK SURGERY  2019    protruded disc    CHOLECYSTECTOMY      1988    COLONOSCOPY      CYST REMOVAL  1965    pilonidal    ENDOSCOPY, COLON, DIAGNOSTIC      GASTRIC FUNDOPLICATION  [address]    HYSTERECTOMY  1998    still has ovaries    JOINT REPLACEMENT Left 2015    HIP    JOINT REPLACEMENT Right     HIP    KNEE ARTHROSCOPY Bilateral 2000    LUMBAR FUSION N/A 01/18/2019    HARDWARE REMOVAL L4-S1, L3-4 DECOMPRESSION AND FUSION EXTENSION TO L3 performed by Dony Black MD at [address] [facility] [address] Right 06/09/2016    TOTAL KNEE ARTHROPLASTY Right 2002    TOTAL KNEE ARTHROPLASTY Left 2010    VASCULAR SURGERY Bilateral 2006    LEG       Social History     Socioeconomic History    Marital status:      Spouse name: Not on file    Number of children: Not on file    Years of education: Not on file    Highest education level: Not on file   Occupational History    Not on file   Tobacco Use    Smoking status: Current Every Day Smoker     Packs/day: 1.00     Years: 50.00     Pack years: 50.00     Types: Cigarettes    Smokeless tobacco: Never Used   Vaping Use    Vaping Use: Never assessed   Substance and Sexual Activity    Alcohol use: Yes     Comment: RARELY    Drug use: No    Sexual activity: Not on file   Other Topics Concern    Not on file   Social History Narrative    Not on file     Social Determinants of Health     Financial Resource Strain: Low Risk     Difficulty of Paying Living Expenses: Not hard at all   Food Insecurity: No Food Insecurity    Worried About Running Out of Food in the Last Year: Never true    Neelam of Food in the Last Year: Never true   Transportation Needs: No Transportation Needs    Lack of Transportation (Medical): No    Lack of Transportation (Non-Medical): No   Physical Activity:     Days of Exercise per Week:     Minutes of Exercise per Session:    Stress:     Feeling of Stress :    Social Connections:     Frequency of Communication with Friends and Family:     Frequency of Social Gatherings with Friends and Family:     Attends Zoroastrianism Services:     Active Member of Clubs or Organizations:     Attends Club or Organization Meetings:     Marital Status:    Intimate Partner Violence:     Fear of Current or Ex-Partner:     Emotionally Abused:     Physically Abused:     Sexually Abused:        Family History   Problem Relation Age of Onset    Diabetes Mother     Heart Disease Mother     Cancer Father     Diabetes Brother     Cancer Brother     High Blood Pressure Sister        Current Outpatient Medications   Medication Sig Dispense Refill    gabapentin (NEURONTIN) 800 MG tablet Take 1 tablet by mouth 2 times daily for 90 days.  180 tablet 1    metFORMIN (GLUCOPHAGE) 500 MG tablet Take 1 tablet by mouth 2 times daily (with meals) 180 tablet 1    meloxicam (MOBIC) 15 MG tablet Take 1 tablet by mouth daily 30 tablet 0    metoprolol tartrate (LOPRESSOR) 50 MG tablet Take 1 tablet by mouth 2 times daily 180 tablet 0    atorvastatin (LIPITOR) 10 MG tablet Take 1 tablet by mouth daily 90 tablet 1    vitamin B-12 (CYANOCOBALAMIN) 100 MCG tablet 1,000 mcg       Calcium Carbonate-Vitamin D (OYSTER SHELL CALCIUM/D) 500-200 MG-UNIT TABS Take 1 tablet by mouth 2 times daily (with meals)      aspirin 81 MG EC tablet Take 81 mg by mouth daily      albuterol sulfate HFA (VENTOLIN HFA) 108 (90 Base) MCG/ACT inhaler Inhale 2 puffs into the lungs 4 times daily as needed for Wheezing 1 Inhaler 2    alendronate (FOSAMAX) 35 MG tablet Take 35 mg by mouth every 7 days On Thursdays       No current facility-administered medications for this visit. DATA:  Lab Results   Component Value Date    WBC 7.6 03/25/2021    HGB 14.3 03/25/2021     03/25/2021    CHOL 115 03/25/2021    TRIG 54 03/25/2021    HDL 55 03/25/2021    ALT 13 03/25/2021    AST 14 03/25/2021     03/25/2021    K 4.1 03/25/2021     03/25/2021    CREATININE 0.60 03/25/2021    BUN 18 03/25/2021    CO2 33 (H) 03/25/2021    INR 0.93 01/03/2019    LABA1C 6.8 (H) 03/25/2021    LABMICR 11 03/25/2021       BP (!) 143/79 (Site: Right Upper Arm, Position: Sitting, Cuff Size: Medium Adult)   Pulse 53   Temp 97.4 °F (36.3 °C) (Temporal)   Resp 18   Ht 5' 10\" (1.778 m)   Wt 191 lb 4.8 oz (86.8 kg)   BMI 27.45 kg/m²     NEUROLOGICAL EXAMINATION:     MENTAL STATUS: Patient is alert and oriented. There is no confusion or aphasia. Memory is normal.     CRANIAL NERVES: Pupils are equal and reactive. EOMS are equal in all directions. There is no nystagmus or any other abnormal eye movements. Facial sensation is normal.  There is no facial weakness. There is no loss of hearing. Palate and tongue movements are normal.     MOTOR EXAMINATION: Muscle tone is normal in all the limbs. There is no focal weakness in the upper or lower extremities. .  There are no abnormal limb movements. SENSORY EXAMINATION:.  Glove and stocking decrease in sensation below the lower mid leg level in both lower extremities. Vibration sensation and position sensations are impaired in both the feet . STRETCH REFLEXES: Symmetrically diminished in both the upper and lower limbs. GAIT: There is no ataxia. Romberg sign is positive    IMPRESSION:    1. Diabetic peripheral neuropathy. Symptoms appear to be stable and tolerable on gabapentin  800 mg 2 times daily, without side effects. Her blood glucose levels have been under good control. 2.  Hypertension  3. Osteoarthritis  4. Hyperlipidemia. 5.  Lumbar degenerative disc disease. PLAN:    1. Continue gabapentin 800 mg 2 times daily. No further work-up neurological is indicated at this time  2. Follow-up in 6 months or earlier if needed. NOTE: This neurology evaluation is part of outpatient coverage at [facility]  1-2 days per week. Patients requiring frequent evaluations or uncomfortable with potential 3-4 day turnaround on questions or calls  may be better served by a neurologist in the area full time. [facility]. Tray is available for outpatient visits and procedures including EMG/NCS. Non-[facility] neurologists also practice in [facility] (Dr. Danii Sood) and [facility] Serve (Kelsi Boudreaux). Satish Nj MD   6/10/2021  9:12 AM        CC: Errol Matta MD

## 2021-06-10 NOTE — PATIENT INSTRUCTIONS
SURVEY:    You may be receiving a survey from [employer] regarding your visit today. Please complete the survey to enable us to provide the highest quality of care to you and your family. If you cannot score us a very good on any question, please call the office to discuss how we could have made your experience a very good one. Thank you.

## 2021-06-11 RX ORDER — MELOXICAM 15 MG/1
15 TABLET ORAL DAILY
Qty: 90 TABLET | Refills: 0 | Status: SHIPPED | OUTPATIENT
Start: 2021-06-11 | End: 2021-10-18 | Stop reason: SDUPTHER

## 2021-06-23 ENCOUNTER — OFFICE VISIT (OUTPATIENT)
Dept: FAMILY MEDICINE CLINIC | Age: 71
End: 2021-06-23
Payer: COMMERCIAL

## 2021-06-23 VITALS
HEART RATE: 80 BPM | DIASTOLIC BLOOD PRESSURE: 70 MMHG | HEIGHT: 69 IN | TEMPERATURE: 95.7 F | WEIGHT: 190 LBS | OXYGEN SATURATION: 97 % | SYSTOLIC BLOOD PRESSURE: 100 MMHG | BODY MASS INDEX: 28.14 KG/M2

## 2021-06-23 DIAGNOSIS — I10 ESSENTIAL HYPERTENSION: ICD-10-CM

## 2021-06-23 DIAGNOSIS — E11.9 TYPE 2 DIABETES MELLITUS WITHOUT COMPLICATION, WITHOUT LONG-TERM CURRENT USE OF INSULIN (HCC): Primary | ICD-10-CM

## 2021-06-23 DIAGNOSIS — F41.9 ANXIETY: ICD-10-CM

## 2021-06-23 DIAGNOSIS — F32.A DEPRESSION, UNSPECIFIED DEPRESSION TYPE: ICD-10-CM

## 2021-06-23 PROCEDURE — 99214 OFFICE O/P EST MOD 30 MIN: CPT | Performed by: NURSE PRACTITIONER

## 2021-06-23 ASSESSMENT — PATIENT HEALTH QUESTIONNAIRE - PHQ9
7. TROUBLE CONCENTRATING ON THINGS, SUCH AS READING THE NEWSPAPER OR WATCHING TELEVISION: 2
1. LITTLE INTEREST OR PLEASURE IN DOING THINGS: 2
SUM OF ALL RESPONSES TO PHQ9 QUESTIONS 1 & 2: 4
8. MOVING OR SPEAKING SO SLOWLY THAT OTHER PEOPLE COULD HAVE NOTICED. OR THE OPPOSITE, BEING SO FIGETY OR RESTLESS THAT YOU HAVE BEEN MOVING AROUND A LOT MORE THAN USUAL: 0
SUM OF ALL RESPONSES TO PHQ QUESTIONS 1-9: 8
10. IF YOU CHECKED OFF ANY PROBLEMS, HOW DIFFICULT HAVE THESE PROBLEMS MADE IT FOR YOU TO DO YOUR WORK, TAKE CARE OF THINGS AT HOME, OR GET ALONG WITH OTHER PEOPLE: 1
SUM OF ALL RESPONSES TO PHQ QUESTIONS 1-9: 8
6. FEELING BAD ABOUT YOURSELF - OR THAT YOU ARE A FAILURE OR HAVE LET YOURSELF OR YOUR FAMILY DOWN: 1
2. FEELING DOWN, DEPRESSED OR HOPELESS: 2
9. THOUGHTS THAT YOU WOULD BE BETTER OFF DEAD, OR OF HURTING YOURSELF: 0
5. POOR APPETITE OR OVEREATING: 0
SUM OF ALL RESPONSES TO PHQ QUESTIONS 1-9: 8
4. FEELING TIRED OR HAVING LITTLE ENERGY: 1
3. TROUBLE FALLING OR STAYING ASLEEP: 0

## 2021-06-23 ASSESSMENT — ENCOUNTER SYMPTOMS
SINUS PRESSURE: 0
NAUSEA: 0
CHEST TIGHTNESS: 0
ABDOMINAL PAIN: 0
DIARRHEA: 0
SHORTNESS OF BREATH: 0
COUGH: 0
RHINORRHEA: 0

## 2021-06-23 NOTE — PROGRESS NOTES
Medication Therapy: metformin 500mg BID  Hemoglobin A1C (%)   Date Value   2021 6.8 (H)   2019 6.8 (H)      Hypertension:  Current medication regimen includes Metoprolol. She does not monitoring her blood pressure at home. She reports a well balanced diet. She denies following a low-sodium diet. For physical activity she reports working 4 days and on her feet around 4 hours a day. She denies chest pain, shortness of breath, headache, vision changes, palpitations, lightheadedness, or dizziness. Anxiety:  She reports the only thing new is her anxiety for the past 6 months. She reports she is having boyfriend problems and her best friends  anniversary coming up. She denies any past history of anxiety or depression. She reports racing thoughts. She denies any new issues with sleeping. Denies any homicidal or suicidal ideations. Denies any counseling, states she has never discussed this before.      Past Medical History:     Past Medical History:   Diagnosis Date    Arthritis     Crespo's esophagus     Diabetes mellitus ([facility] [address]     Hypertension       Reviewed all health maintenance requirements and ordered appropriate tests  Health Maintenance Due   Topic Date Due    Diabetic retinal exam  Never done    Colon cancer screen colonoscopy  Never done    Low dose CT lung screening  Never done    Annual Wellness Visit (AWV)  Never done       Past Surgical History:     Past Surgical History:   Procedure Laterality Date    APPENDECTOMY  1969    BACK SURGERY      upper disc    BACK SURGERY  2019    protruded disc    CHOLECYSTECTOMY      1988    COLONOSCOPY      CYST REMOVAL  1965    pilonidal    ENDOSCOPY, COLON, DIAGNOSTIC      GASTRIC FUNDOPLICATION  [address]    still has ovaries    JOINT REPLACEMENT Left 2015    HIP    JOINT REPLACEMENT Right     HIP    KNEE ARTHROSCOPY Bilateral 2000    LUMBAR FUSION N/A 2019    HARDWARE REMOVAL L4-S1, L3-4 DECOMPRESSION AND FUSION EXTENSION TO L3 performed by Ketan Flower MD at [address] Right 06/09/2016    TOTAL KNEE ARTHROPLASTY Right 2002    TOTAL KNEE ARTHROPLASTY Left 2010    VASCULAR SURGERY Bilateral 2006    LEG        Medications:       Prior to Admission medications    Medication Sig Start Date End Date Taking? Authorizing Provider   sertraline (ZOLOFT) 50 MG tablet Take 1 tablet by mouth daily 6/23/21  Yes MURRAY Staley CNP   meloxicam [facility]) 15 MG tablet Take 1 tablet by mouth daily 6/11/21  Yes MURRAY Staley CNP   gabapentin (NEURONTIN) 800 MG tablet Take 1 tablet by mouth 2 times daily for 90 days. 6/10/21 9/8/21 Yes Merissa Asencio MD   metFORMIN (GLUCOPHAGE) 500 MG tablet Take 1 tablet by mouth 2 times daily (with meals) 5/21/21  Yes MURRAY Staley CNP   metoprolol tartrate (LOPRESSOR) 50 MG tablet Take 1 tablet by mouth 2 times daily 4/19/21  Yes MURRAY Staley CNP   atorvastatin (LIPITOR) 10 MG tablet Take 1 tablet by mouth daily 4/19/21  Yes MURRAY Staley CNP   vitamin B-12 (CYANOCOBALAMIN) 100 MCG tablet 1,000 mcg  3/29/17  Yes Historical Provider, MD   Calcium Carbonate-Vitamin D (OYSTER SHELL CALCIUM/D) 500-200 MG-UNIT TABS Take 1 tablet by mouth 2 times daily (with meals)   Yes Historical Provider, MD   aspirin 81 MG EC tablet Take 81 mg by mouth daily   Yes Historical Provider, MD   albuterol sulfate HFA (VENTOLIN HFA) 108 (90 Base) MCG/ACT inhaler Inhale 2 puffs into the lungs 4 times daily as needed for Wheezing 3/16/21  Yes MURRAY Staley CNP   alendronate (FOSAMAX) 35 MG tablet Take 35 mg by mouth every 7 days On Thursdays   Yes Historical Provider, MD        Allergies:       Penicillins    Social History:     Tobacco:    reports that she has been smoking cigarettes. She has a 50.00 pack-year smoking history. She has never used smokeless tobacco.  Alcohol:      reports current alcohol use.   Drug Use: reports no history of drug use. Family History:     Family History   Problem Relation Age of Onset    Diabetes Mother     Heart Disease Mother     Cancer Father     Diabetes Brother     Cancer Brother     High Blood Pressure Sister        Review of Systems:     Positive and Negative as described in HPI    Review of Systems   Constitutional: Negative for activity change, appetite change, fatigue and fever. HENT: Negative for congestion, rhinorrhea and sinus pressure. Respiratory: Negative for cough, chest tightness and shortness of breath. Cardiovascular: Negative for chest pain. Gastrointestinal: Negative for abdominal pain, diarrhea and nausea. Genitourinary: Negative for difficulty urinating, flank pain and genital sores. Musculoskeletal: Negative for arthralgias, neck pain and neck stiffness. Neurological: Positive for numbness (bilateral toe numbness -chronic. ). Negative for speech difficulty, light-headedness and headaches. Psychiatric/Behavioral: Negative for agitation, behavioral problems, confusion, sleep disturbance and suicidal ideas. The patient is nervous/anxious (for the past 6 months. ). Physical Exam:   Vitals:  /70   Pulse 80   Temp 95.7 °F (35.4 °C)   Ht 5' 9\" (1.753 m)   Wt 190 lb (86.2 kg)   SpO2 97%   BMI 28.06 kg/m²     Physical Exam  Constitutional:       Appearance: Normal appearance. HENT:      Head: Normocephalic. Nose: Nose normal.      Mouth/Throat:      Mouth: Mucous membranes are moist.   Cardiovascular:      Rate and Rhythm: Normal rate and regular rhythm. Pulses: Normal pulses. Heart sounds: Normal heart sounds. Pulmonary:      Effort: Pulmonary effort is normal.      Breath sounds: Normal breath sounds. Abdominal:      General: Abdomen is flat. Bowel sounds are normal.      Palpations: Abdomen is soft. Feet:      Right foot:      Protective Sensation: 7 sites tested. Skin integrity: Dry skin present.  No fissure. Toenail Condition: Right toenails are abnormally thick. Fungal disease present. Left foot:      Protective Sensation: 7 sites tested. Skin integrity: Dry skin present. No fissure. Toenail Condition: Left toenails are abnormally thick. Fungal disease present. Comments: Fungal changes to great toe bilaterally. Monofilament equal bilaterally with 7 sites tested. Normal vibratory sense on right foot diminished on left. Skin:     General: Skin is warm and dry. Neurological:      General: No focal deficit present. Mental Status: She is alert and oriented to person, place, and time. Mental status is at baseline. Psychiatric:         Attention and Perception: Attention normal.         Mood and Affect: Mood is anxious (mild). Affect is flat. Speech: Speech normal.         Behavior: Behavior normal. Behavior is cooperative. Data:     Lab Results   Component Value Date     03/25/2021    K 4.1 03/25/2021     03/25/2021    CO2 33 03/25/2021    BUN 18 03/25/2021    CREATININE 0.60 03/25/2021    GLUCOSE 91 03/25/2021    AST 14 03/25/2021    ALT 13 03/25/2021     Lab Results   Component Value Date    WBC 7.6 03/25/2021    RBC 5.21 03/25/2021    HGB 14.3 03/25/2021    HCT 47.0 03/25/2021    MCV 90.2 03/25/2021    MCH 27.4 03/25/2021    MCHC 30.4 03/25/2021    RDW 13.4 03/25/2021     03/25/2021    MPV 10.9 03/25/2021     No results found for: TSH  Lab Results   Component Value Date    CHOL 115 03/25/2021    HDL 55 03/25/2021    LABA1C 6.8 03/25/2021       Assessment/Plan:      Diagnosis Orders   1. Type 2 diabetes mellitus without complication, without long-term current use of insulin (HCC)  [facility] DIABETES FOOT EXAM   2. Essential hypertension     3. Anxiety     4. Depression, unspecified depression type       DM  -Foot exam completed.    -Reviewed most recent A1c of 6.8.  -Counseled on diabetic diet and routine physical activity    Hypertension  -WNL in office, continue current medications.  -Reviewed lifestyle modifications to assist with lowering blood pressure including weight loss, healthy diet, exercising routinely, low-sodium diet, limiting caffeine and alcohol, and encouraging stress relief techniques. Anxiety/Depression:  - Positive PHQ9 and JENNIFER 7 completed today in office  Will initiate Sertraline 50mg daily. Reviewed side effects of this medication at length. Patient denies interest in counseling at this time. --Encouraged at-home stress-relieving techniques such as yoga, meditation, and deep breathing. -F/u medcheck 3 weeks or sooner if concerns arise. Wellness  -Discussed CT lung findings. Negative for malignancy. Chronic changes found. Repeat annually. Denies respiratory symptoms today. -Reviewed colonoscopy results from 7/2019. Recommended repeat 3 years (7/2022). Onychomycosis:  Fungal changes found on diabetic foot exam.  Discussed oral therapy as she has failed topical OTC therapy. Will discuss further at upcoming appointment and evaluate LFTs prior to, during, and after treatment if initiated. Completed Refills   Requested Prescriptions     Signed Prescriptions Disp Refills    sertraline (ZOLOFT) 50 MG tablet 30 tablet 1     Sig: Take 1 tablet by mouth daily       Orders Placed This Encounter   Procedures     DIABETES FOOT EXAM        No results found for this visit on 06/23/21. Return in about 3 weeks (around 7/14/2021), or if symptoms worsen or fail to improve, for zoloft medcheck .     Electronically signed by MURRAY Hamilton CNP on 06/23/21 at 12:13 PM.

## 2021-06-23 NOTE — PATIENT INSTRUCTIONS
SURVEY:    You may be receiving a survey from [employer] regarding your visit today. Please complete the survey to enable us to provide the highest quality of care to you and your family. If you cannot score us a very good on any question, please call the office to discuss how we could of made your experience a very good one. Thank you.

## 2021-07-20 ENCOUNTER — OFFICE VISIT (OUTPATIENT)
Dept: FAMILY MEDICINE CLINIC | Age: 71
End: 2021-07-20
Payer: COMMERCIAL

## 2021-07-20 VITALS
HEART RATE: 58 BPM | WEIGHT: 194 LBS | OXYGEN SATURATION: 98 % | SYSTOLIC BLOOD PRESSURE: 110 MMHG | BODY MASS INDEX: 28.73 KG/M2 | HEIGHT: 69 IN | DIASTOLIC BLOOD PRESSURE: 68 MMHG

## 2021-07-20 DIAGNOSIS — R10.9 ABDOMINAL DISCOMFORT: ICD-10-CM

## 2021-07-20 DIAGNOSIS — M81.0 OSTEOPOROSIS, UNSPECIFIED OSTEOPOROSIS TYPE, UNSPECIFIED PATHOLOGICAL FRACTURE PRESENCE: ICD-10-CM

## 2021-07-20 DIAGNOSIS — F41.9 ANXIETY: Primary | ICD-10-CM

## 2021-07-20 DIAGNOSIS — I10 ESSENTIAL HYPERTENSION: ICD-10-CM

## 2021-07-20 DIAGNOSIS — E11.9 TYPE 2 DIABETES MELLITUS WITHOUT COMPLICATION, WITHOUT LONG-TERM CURRENT USE OF INSULIN (HCC): ICD-10-CM

## 2021-07-20 PROCEDURE — 99214 OFFICE O/P EST MOD 30 MIN: CPT | Performed by: NURSE PRACTITIONER

## 2021-07-20 ASSESSMENT — ENCOUNTER SYMPTOMS
ABDOMINAL PAIN: 1
DIARRHEA: 1

## 2021-07-20 NOTE — PATIENT INSTRUCTIONS
SURVEY:    You may be receiving a survey from [employer] regarding your visit today. Please complete the survey to enable us to provide the highest quality of care to you and your family. If you cannot score us a very good on any question, please call the office to discuss how we could of made your experience a very good one. Thank you.

## 2021-07-20 NOTE — PROGRESS NOTES
Name: Narendra Mariee  : 1950         Chief Complaint:     Chief Complaint   Patient presents with    Depression     patient states she is doing well after starting Zoloft 50mg. denies any neg side effects. History of Present Illness:      Narendra Mariee is a 79 y.o.  female who presents with Depression (patient states she is doing well after starting Zoloft 50mg. denies any neg side effects. )      HPI     Anxiety:  The patient presents for depression follow-up. She was initiated on sertraline 50 mg on 2021. She states it is working well. She states Harika Workman is handling things better with work and with her daughter\". She admits being less schneider. Denies side effects. Denies changes in sleep. Admits slight anxiousness but that has improved. Abdominal Discomfort:  She admits occasional \"upset stomach\" in her lower abdominal area that occurred prior to initiating zoloft. Denies pattern to the abdominal discomfort. Abdominal discomfort occurs several times per week. She states this lasts 10 minutes. She admits chronic history of diarrhea. She admits single episode of \"sand-like material\" on her stool that occurred months ago. Denies blood in the stool.      Past Medical History:     Past Medical History:   Diagnosis Date    Arthritis     Crespo's esophagus     Diabetes mellitus ([facility] [address]     Hypertension       Reviewed all health maintenance requirements and ordered appropriate tests  Health Maintenance Due   Topic Date Due    Diabetic retinal exam  Never done    Low dose CT lung screening  Never done    Annual Wellness Visit (AWV)  Never done       Past Surgical History:     Past Surgical History:   Procedure Laterality Date    APPENDECTOMY  1969    BACK SURGERY      upper disc    BACK SURGERY  2019    protruded disc    CHOLECYSTECTOMY          COLONOSCOPY      CYST REMOVAL  1965    pilonidal    ENDOSCOPY, COLON, DIAGNOSTIC      GASTRIC FUNDOPLICATION  1285    HYSTERECTOMY 1998    still has ovaries    JOINT REPLACEMENT Left 2015    HIP    JOINT REPLACEMENT Right     HIP    KNEE ARTHROSCOPY Bilateral 2000    LUMBAR FUSION N/A 01/18/2019    HARDWARE REMOVAL L4-S1, L3-4 DECOMPRESSION AND FUSION EXTENSION TO L3 performed by Ender Addison MD at [facility] Right 06/09/2016    TOTAL KNEE ARTHROPLASTY Right 2002    TOTAL KNEE ARTHROPLASTY Left 2010    VASCULAR SURGERY Bilateral 2006    LEG        Medications:       Prior to Admission medications    Medication Sig Start Date End Date Taking? Authorizing Provider   sertraline (ZOLOFT) 50 MG tablet Take 1 tablet by mouth daily 6/23/21  Yes MURRAY Gutierrez CNP   meloxicam [facility]) 15 MG tablet Take 1 tablet by mouth daily 6/11/21  Yes MURRAY Gutierrez CNP   gabapentin (NEURONTIN) 800 MG tablet Take 1 tablet by mouth 2 times daily for 90 days.  6/10/21 9/8/21 Yes Joaquim Moss MD   metFORMIN (GLUCOPHAGE) 500 MG tablet Take 1 tablet by mouth 2 times daily (with meals) 5/21/21  Yes MURRAY Gutierrez CNP   metoprolol tartrate (LOPRESSOR) 50 MG tablet Take 1 tablet by mouth 2 times daily 4/19/21  Yes MURRAY Gutierrez CNP   atorvastatin (LIPITOR) 10 MG tablet Take 1 tablet by mouth daily 4/19/21  Yes MURRAY Gutierrez CNP   vitamin B-12 (CYANOCOBALAMIN) 100 MCG tablet 1,000 mcg  3/29/17  Yes Historical Provider, MD   Calcium Carbonate-Vitamin D (OYSTER SHELL CALCIUM/D) 500-200 MG-UNIT TABS Take 1 tablet by mouth 2 times daily (with meals)   Yes Historical Provider, MD   aspirin 81 MG EC tablet Take 81 mg by mouth daily   Yes Historical Provider, MD   albuterol sulfate HFA (VENTOLIN HFA) 108 (90 Base) MCG/ACT inhaler Inhale 2 puffs into the lungs 4 times daily as needed for Wheezing 3/16/21  Yes MURRAY Gutierrez CNP   alendronate (FOSAMAX) 35 MG tablet Take 35 mg by mouth every 7 days On Thursdays   Yes Historical Provider, MD        Allergies: Penicillins    Social History:     Tobacco:    reports that she has been smoking cigarettes. She has a 50.00 pack-year smoking history. She has never used smokeless tobacco.  Alcohol:      reports current alcohol use. Drug Use:  reports no history of drug use. Family History:     Family History   Problem Relation Age of Onset    Diabetes Mother     Heart Disease Mother     Cancer Father     Diabetes Brother     Cancer Brother     High Blood Pressure Sister        Review of Systems:     Positive and Negative as described in HPI    Review of Systems   Gastrointestinal: Positive for abdominal pain and diarrhea. Psychiatric/Behavioral: The patient is not nervous/anxious. Physical Exam:   Vitals:  /68   Pulse 58   Ht 5' 9\" (1.753 m)   Wt 194 lb (88 kg)   SpO2 98%   BMI 28.65 kg/m²     Physical Exam  Constitutional:       General: She is not in acute distress. Appearance: Normal appearance. She is normal weight. She is not ill-appearing or toxic-appearing. HENT:      Head: Normocephalic. Cardiovascular:      Rate and Rhythm: Normal rate and regular rhythm. Heart sounds: Normal heart sounds. No murmur heard. Pulmonary:      Effort: Pulmonary effort is normal. No respiratory distress. Breath sounds: Normal breath sounds. No stridor. No wheezing, rhonchi or rales. Abdominal:      General: Abdomen is flat. Bowel sounds are normal. There is no distension. Palpations: Abdomen is soft. There is no mass. Tenderness: There is no abdominal tenderness. There is no guarding. Hernia: No hernia is present. Skin:     General: Skin is warm. Neurological:      Mental Status: She is alert and oriented to person, place, and time. Psychiatric:         Mood and Affect: Mood normal.         Behavior: Behavior normal.         Thought Content:  Thought content normal.         Judgment: Judgment normal.         Data:     Lab Results   Component Value Date     03/25/2021    K 4.1 03/25/2021     03/25/2021    CO2 33 03/25/2021    BUN 18 03/25/2021    CREATININE 0.60 03/25/2021    GLUCOSE 91 03/25/2021    AST 14 03/25/2021    ALT 13 03/25/2021     Lab Results   Component Value Date    WBC 7.6 03/25/2021    RBC 5.21 03/25/2021    HGB 14.3 03/25/2021    HCT 47.0 03/25/2021    MCV 90.2 03/25/2021    MCH 27.4 03/25/2021    MCHC 30.4 03/25/2021    RDW 13.4 03/25/2021     03/25/2021    MPV 10.9 03/25/2021     No results found for: TSH  Lab Results   Component Value Date    CHOL 115 03/25/2021    HDL 55 03/25/2021    LABA1C 6.8 03/25/2021       Assessment/Plan:      Diagnosis Orders   1. Anxiety     2. Type 2 diabetes mellitus without complication, without long-term current use of insulin (HCC)  Hemoglobin A1C   3. Essential hypertension  ALT    AST    Basic Metabolic Panel    CBC   4. Osteoporosis, unspecified osteoporosis type, unspecified pathological fracture presence  Vitamin D 25 Hydroxy   5. Abdominal discomfort         Anxiety:  Stable on Zoloft 50 mg.  Follow-up in 6 months or sooner if symptoms worsen or persist    Abdominal discomfort:  Discussed possible dietary triggers. Encouraged completion of dietary diary. If symptoms continue, will consider adding Bentyl. I question IBS due to chronic diarrhea. Colonoscopy completed 2019 with recommended repeat 3 years. Patient due for colonoscopy 3/2022.     F/u 6 months with AWV    Completed Refills   Requested Prescriptions      No prescriptions requested or ordered in this encounter       Orders Placed This Encounter   Procedures    ALT     Standing Status:   Future     Standing Expiration Date:   7/20/2022    AST     Standing Status:   Future     Standing Expiration Date:   7/20/2022    Basic Metabolic Panel     Standing Status:   Future     Standing Expiration Date:   7/20/2022    CBC     Standing Status:   Future     Standing Expiration Date:   7/20/2022    Hemoglobin A1C     Standing Status: Future     Standing Expiration Date:   7/20/2022    Vitamin D 25 Hydroxy     Standing Status:   Future     Standing Expiration Date:   7/20/2022        No results found for this visit on 07/20/21. Return in about 6 months (around 1/20/2022), or if symptoms worsen or fail to improve, for AWV with labs prior.     Electronically signed by MURRAY Cat CNP on 07/20/21 at 12:58 PM.

## 2021-08-04 ENCOUNTER — TELEPHONE (OUTPATIENT)
Dept: FAMILY MEDICINE CLINIC | Age: 71
End: 2021-08-04

## 2021-08-04 ENCOUNTER — HOSPITAL ENCOUNTER (OUTPATIENT)
Age: 71
Discharge: HOME | End: 2021-08-04
Payer: COMMERCIAL

## 2021-08-04 ENCOUNTER — OFFICE VISIT (OUTPATIENT)
Dept: FAMILY MEDICINE CLINIC | Age: 71
End: 2021-08-04
Payer: COMMERCIAL

## 2021-08-04 VITALS
OXYGEN SATURATION: 96 % | HEART RATE: 64 BPM | DIASTOLIC BLOOD PRESSURE: 65 MMHG | SYSTOLIC BLOOD PRESSURE: 120 MMHG | HEIGHT: 69 IN | BODY MASS INDEX: 28.14 KG/M2 | WEIGHT: 190 LBS

## 2021-08-04 DIAGNOSIS — R10.13 EPIGASTRIC ABDOMINAL PAIN: ICD-10-CM

## 2021-08-04 DIAGNOSIS — K22.70 BARRETT'S ESOPHAGUS WITHOUT DYSPLASIA: ICD-10-CM

## 2021-08-04 DIAGNOSIS — R10.13 EPIGASTRIC ABDOMINAL PAIN: Primary | ICD-10-CM

## 2021-08-04 LAB
-: ABNORMAL
AMORPHOUS: ABNORMAL
AMYLASE: 25 U/L (ref 28–100)
ANION GAP SERPL CALCULATED.3IONS-SCNC: 11 MMOL/L (ref 9–17)
BACTERIA: ABNORMAL
BILIRUBIN URINE: NEGATIVE
BUN BLDV-MCNC: 19 MG/DL (ref 8–23)
BUN/CREAT BLD: 31 (ref 9–20)
CALCIUM SERPL-MCNC: 9.4 MG/DL (ref 8.6–10.4)
CASTS UA: ABNORMAL /LPF
CHLORIDE BLD-SCNC: 100 MMOL/L (ref 98–107)
CO2: 27 MMOL/L (ref 20–31)
COLOR: YELLOW
COMMENT UA: ABNORMAL
CREAT SERPL-MCNC: 0.62 MG/DL (ref 0.5–0.9)
CRYSTALS, UA: ABNORMAL /HPF
EPITHELIAL CELLS UA: ABNORMAL /HPF (ref 0–25)
GFR AFRICAN AMERICAN: >60 ML/MIN
GFR NON-AFRICAN AMERICAN: >60 ML/MIN
GFR SERPL CREATININE-BSD FRML MDRD: ABNORMAL ML/MIN/{1.73_M2}
GFR SERPL CREATININE-BSD FRML MDRD: ABNORMAL ML/MIN/{1.73_M2}
GLUCOSE BLD-MCNC: 116 MG/DL (ref 70–99)
GLUCOSE URINE: NEGATIVE
KETONES, URINE: NEGATIVE
LEUKOCYTE ESTERASE, URINE: NEGATIVE
LIPASE: 16 U/L (ref 13–60)
MUCUS: ABNORMAL
NITRITE, URINE: NEGATIVE
OTHER OBSERVATIONS UA: ABNORMAL
PH UA: 6.5 (ref 5–9)
POTASSIUM SERPL-SCNC: 4.2 MMOL/L (ref 3.7–5.3)
PROTEIN UA: NEGATIVE
RBC UA: ABNORMAL /HPF (ref 0–2)
RENAL EPITHELIAL, UA: ABNORMAL /HPF
SODIUM BLD-SCNC: 138 MMOL/L (ref 135–144)
SPECIFIC GRAVITY UA: 1.02 (ref 1.01–1.02)
TRICHOMONAS: ABNORMAL
TURBIDITY: CLEAR
URINE HGB: ABNORMAL
UROBILINOGEN, URINE: NORMAL
WBC UA: ABNORMAL /HPF (ref 0–5)
YEAST: ABNORMAL

## 2021-08-04 PROCEDURE — 81001 URINALYSIS AUTO W/SCOPE: CPT

## 2021-08-04 PROCEDURE — 82150 ASSAY OF AMYLASE: CPT

## 2021-08-04 PROCEDURE — 36415 COLL VENOUS BLD VENIPUNCTURE: CPT

## 2021-08-04 PROCEDURE — 83690 ASSAY OF LIPASE: CPT

## 2021-08-04 PROCEDURE — 99214 OFFICE O/P EST MOD 30 MIN: CPT | Performed by: NURSE PRACTITIONER

## 2021-08-04 PROCEDURE — 80048 BASIC METABOLIC PNL TOTAL CA: CPT

## 2021-08-04 RX ORDER — ATORVASTATIN CALCIUM 10 MG/1
10 TABLET, FILM COATED ORAL DAILY
Qty: 90 TABLET | Refills: 1 | Status: SHIPPED | OUTPATIENT
Start: 2021-08-04 | End: 2021-11-03 | Stop reason: SDUPTHER

## 2021-08-04 RX ORDER — OMEPRAZOLE 20 MG/1
20 CAPSULE, DELAYED RELEASE ORAL
Qty: 60 CAPSULE | Refills: 1 | Status: SHIPPED | OUTPATIENT
Start: 2021-08-04 | End: 2022-08-17 | Stop reason: CLARIF

## 2021-08-04 RX ORDER — GABAPENTIN 800 MG/1
800 TABLET ORAL 2 TIMES DAILY
Qty: 180 TABLET | Refills: 1 | Status: CANCELLED | OUTPATIENT
Start: 2021-08-04 | End: 2021-11-02

## 2021-08-04 RX ORDER — ALENDRONATE SODIUM 70 MG/1
70 TABLET ORAL
Qty: 8 TABLET | Refills: 1 | Status: SHIPPED | OUTPATIENT
Start: 2021-08-04 | End: 2021-08-24 | Stop reason: SDUPTHER

## 2021-08-04 RX ORDER — METOPROLOL TARTRATE 50 MG/1
TABLET, FILM COATED ORAL
Qty: 180 TABLET | Refills: 0 | Status: SHIPPED | OUTPATIENT
Start: 2021-08-04 | End: 2022-04-11

## 2021-08-04 ASSESSMENT — ENCOUNTER SYMPTOMS
NAUSEA: 1
ABDOMINAL PAIN: 1

## 2021-08-04 NOTE — PATIENT INSTRUCTIONS
SURVEY:    You may be receiving a survey from [employer] regarding your visit today. Please complete the survey to enable us to provide the highest quality of care to you and your family. If you cannot score us a very good on any question, please call the office to discuss how we could have made your experience a very good one. Thank you.

## 2021-08-04 NOTE — PROGRESS NOTES
Name: Leatha Ott  : 1950         Chief Complaint:     Chief Complaint   Patient presents with    Nausea     still feeling slightly neasus    Dizziness     not so muc dizzy. states she is increasing water to 32 oz daily.  ED Follow-up     patient was seen on the  for these issues. states the dx with poor kidney function and dehydration.  Pain     pain in upper quad. History of Present Illness:      Leatha Ott is a 79 y.o.  female who presents with Nausea (still feeling slightly neasus), Dizziness (not so muc dizzy. states she is increasing water to 32 oz daily. ), ED Follow-up (patient was seen on the  for these issues. states the dx with poor kidney function and dehydration. ), and Pain (pain in upper quad. )      HPI     The patient presents for ED follow up. She presented to [facility] ED on 21 for abdominal pain, dizziness, and leg weakness. She was diagnosed with dehydration. CT scan of head shows no acute process. Creatinine elevated 1.4. She was given IV fluids and Zofran. Today, she states that the dizziness has improved. She admits continued leg weakness. Admits upper abdominal pain x several months. Pain is present daily and described as \"sick feeling\". Denies fever or chills.      Past Medical History:     Past Medical History:   Diagnosis Date    Arthritis     Crespo's esophagus     Diabetes mellitus ([facility] [address]     Hypertension       Reviewed all health maintenance requirements and ordered appropriate tests  Health Maintenance Due   Topic Date Due    Diabetic retinal exam  Never done    Low dose CT lung screening  Never done    Annual Wellness Visit (AWV)  Never done       Past Surgical History:     Past Surgical History:   Procedure Laterality Date    APPENDECTOMY  1969    BACK SURGERY      upper disc    BACK SURGERY  2019    protruded disc    CHOLECYSTECTOMY          COLONOSCOPY      CYST REMOVAL  1965    pilonidal    ENDOSCOPY, COLON, DIAGNOSTIC      GASTRIC FUNDOPLICATION  0660    HYSTERECTOMY  1998    still has ovaries    JOINT REPLACEMENT Left 2015    HIP    JOINT REPLACEMENT Right     HIP    KNEE ARTHROSCOPY Bilateral 2000    LUMBAR FUSION N/A 01/18/2019    HARDWARE REMOVAL L4-S1, L3-4 DECOMPRESSION AND FUSION EXTENSION TO L3 performed by Kira Baumann MD at [address] Right 06/09/2016    TOTAL KNEE ARTHROPLASTY Right 2002    TOTAL KNEE ARTHROPLASTY Left 2010    VASCULAR SURGERY Bilateral 2006    LEG        Medications:       Prior to Admission medications    Medication Sig Start Date End Date Taking? Authorizing Provider   metoprolol tartrate (LOPRESSOR) 50 MG tablet TAKE 1 TABLET TWICE A DAY 8/4/21  Yes MURRAY Gonzales CNP   atorvastatin (LIPITOR) 10 MG tablet Take 1 tablet by mouth daily 8/4/21  Yes MURRAY Gonzales CNP   alendronate (FOSAMAX) 70 MG tablet Take 1 tablet by mouth every 7 days On Thursdays 8/4/21  Yes MURRAY Gonzales CNP   omeprazole (PRILOSEC) 20 MG delayed release capsule Take 1 capsule by mouth 2 times daily (before meals) 8/4/21  Yes MURRAY Gonzales CNP   sertraline (ZOLOFT) 50 MG tablet Take 1 tablet by mouth daily 6/23/21   MURRAY Gonzales CNP   meloxicam [facility]) 15 MG tablet Take 1 tablet by mouth daily 6/11/21   MURRAY Gonzales CNP   gabapentin (NEURONTIN) 800 MG tablet Take 1 tablet by mouth 2 times daily for 90 days.  6/10/21 9/8/21  Janae Kline MD   metFORMIN (GLUCOPHAGE) 500 MG tablet Take 1 tablet by mouth 2 times daily (with meals) 5/21/21   MURRAY Gonzales CNP   vitamin B-12 (CYANOCOBALAMIN) 100 MCG tablet 1,000 mcg  3/29/17   Historical Provider, MD   Calcium Carbonate-Vitamin D (OYSTER SHELL CALCIUM/D) 500-200 MG-UNIT TABS Take 1 tablet by mouth 2 times daily (with meals)    Historical Provider, MD   aspirin 81 MG EC tablet Take 81 mg by mouth daily    Historical Provider, MD   albuterol sulfate HFA (VENTOLIN HFA) 108 (90 Base) MCG/ACT inhaler Inhale 2 puffs into the lungs 4 times daily as needed for Wheezing 3/16/21   MURRAY Gutierrez - CNP        Allergies:       Penicillins    Social History:     Tobacco:    reports that she has been smoking cigarettes. She has a 50.00 pack-year smoking history. She has never used smokeless tobacco.  Alcohol:      reports current alcohol use. Drug Use:  reports no history of drug use. Family History:     Family History   Problem Relation Age of Onset    Diabetes Mother     Heart Disease Mother     Cancer Father     Diabetes Brother     Cancer Brother     High Blood Pressure Sister        Review of Systems:     Positive and Negative as described in HPI    Review of Systems   Constitutional: Negative for chills and fever. Gastrointestinal: Positive for abdominal pain and nausea. Neurological: Positive for dizziness. Physical Exam:   Vitals:  /65   Pulse 64   Ht 5' 9\" (1.753 m)   Wt 190 lb (86.2 kg)   SpO2 96%   BMI 28.06 kg/m²     Physical Exam  Constitutional:       General: She is not in acute distress. Appearance: Normal appearance. She is normal weight. She is not ill-appearing or toxic-appearing. HENT:      Head: Normocephalic. Cardiovascular:      Rate and Rhythm: Normal rate and regular rhythm. Heart sounds: Normal heart sounds. No murmur heard. Pulmonary:      Effort: Pulmonary effort is normal. No respiratory distress. Breath sounds: Normal breath sounds. No stridor. No wheezing, rhonchi or rales. Abdominal:      Comments: Abdomen flat and soft. Bowel sounds normoactive in all 4 quadrants. No guarding. No masses or hernias. Negative kamara's. Negative McBurneys. Mild LLQ rebound tenderness. Neurological:      Mental Status: She is alert and oriented to person, place, and time. Psychiatric:         Mood and Affect: Mood normal.         Behavior: Behavior normal.         Thought Content:  Thought content normal.         Judgment: Judgment normal.         Data:     Lab Results   Component Value Date     03/25/2021    K 4.1 03/25/2021     03/25/2021    CO2 33 03/25/2021    BUN 18 03/25/2021    CREATININE 0.60 03/25/2021    GLUCOSE 91 03/25/2021    AST 14 03/25/2021    ALT 13 03/25/2021     Lab Results   Component Value Date    WBC 7.6 03/25/2021    RBC 5.21 03/25/2021    HGB 14.3 03/25/2021    HCT 47.0 03/25/2021    MCV 90.2 03/25/2021    MCH 27.4 03/25/2021    MCHC 30.4 03/25/2021    RDW 13.4 03/25/2021     03/25/2021    MPV 10.9 03/25/2021     No results found for: TSH  Lab Results   Component Value Date    CHOL 115 03/25/2021    HDL 55 03/25/2021    LABA1C 6.8 03/25/2021       Assessment/Plan:      Diagnosis Orders   1. Epigastric abdominal pain  Amylase    Lipase    Urinalysis Reflex to Culture    Basic Metabolic Panel       - Reviewed records from ED 8/1/2021. Creatinine elevated 1.46. GFR 35. CT head without contrast showed no acute intracranial process. - Complete lipase, amylase, and UA. Orders placed today. - Due to decreased renal function, counseled on increased water intake. Repeat BMP.   - Rx omeprazole to assist with epigastric pain prescribed today. - Reviewed CT abdomen pelvis 3/2021 showing WNL liver, spleen, pancreas, gallbladder, adrenal glands, and right kidney. Findings also showed sigmoid diverticulosis. - At this time, I recommend further evaluation with EGD given nausea and significant epigastric pain. - F/u 1 week or sooner if symptoms worsen or persist. Discussed emergent s/s and when to notify office.      Completed Refills   Requested Prescriptions     Signed Prescriptions Disp Refills    metoprolol tartrate (LOPRESSOR) 50 MG tablet 180 tablet 0     Sig: TAKE 1 TABLET TWICE A DAY    atorvastatin (LIPITOR) 10 MG tablet 90 tablet 1     Sig: Take 1 tablet by mouth daily    alendronate (FOSAMAX) 70 MG tablet 8 tablet 1     Sig: Take 1 tablet by mouth every 7 days On Thursdays    omeprazole (PRILOSEC) 20 MG delayed release capsule 60 capsule 1     Sig: Take 1 capsule by mouth 2 times daily (before meals)       Orders Placed This Encounter   Procedures    Amylase     Standing Status:   Future     Number of Occurrences:   1     Standing Expiration Date:   8/4/2022    Lipase     Standing Status:   Future     Number of Occurrences:   1     Standing Expiration Date:   8/4/2022    Urinalysis Reflex to Culture     Standing Status:   Future     Number of Occurrences:   1     Standing Expiration Date:   8/4/2022     Order Specific Question:   SPECIFY(EX-CATH,MIDSTREAM,CYSTO,ETC)? Answer:   fasting    Basic Metabolic Panel     Standing Status:   Future     Number of Occurrences:   1     Standing Expiration Date:   8/4/2022        No results found for this visit on 08/04/21. Return if symptoms worsen or fail to improve.     Electronically signed by MURRAY Rubio CNP on 08/04/21 at 10:36 AM.

## 2021-08-09 ENCOUNTER — OFFICE VISIT (OUTPATIENT)
Dept: SURGERY | Age: 71
End: 2021-08-09
Payer: COMMERCIAL

## 2021-08-09 VITALS
WEIGHT: 196 LBS | BODY MASS INDEX: 29.03 KG/M2 | TEMPERATURE: 97.4 F | HEIGHT: 69 IN | HEART RATE: 60 BPM | RESPIRATION RATE: 18 BRPM

## 2021-08-09 DIAGNOSIS — R10.13 EPIGASTRIC PAIN: Primary | ICD-10-CM

## 2021-08-09 DIAGNOSIS — K57.30 SIGMOID DIVERTICULOSIS: ICD-10-CM

## 2021-08-09 DIAGNOSIS — Z72.0 TOBACCO ABUSE: ICD-10-CM

## 2021-08-09 DIAGNOSIS — Z87.19 HISTORY OF BARRETT'S ESOPHAGUS: ICD-10-CM

## 2021-08-09 DIAGNOSIS — R15.9 INCONTINENCE OF FECES, UNSPECIFIED FECAL INCONTINENCE TYPE: ICD-10-CM

## 2021-08-09 DIAGNOSIS — R19.5 LOOSE STOOLS: ICD-10-CM

## 2021-08-09 DIAGNOSIS — Z98.890 HISTORY OF NISSEN FUNDOPLICATION: ICD-10-CM

## 2021-08-09 DIAGNOSIS — Z90.49 S/P LAPAROSCOPIC CHOLECYSTECTOMY: ICD-10-CM

## 2021-08-09 PROCEDURE — 99204 OFFICE O/P NEW MOD 45 MIN: CPT | Performed by: SURGERY

## 2021-08-09 RX ORDER — SODIUM, POTASSIUM,MAG SULFATES 17.5-3.13G
1 SOLUTION, RECONSTITUTED, ORAL ORAL ONCE
Qty: 1 KIT | Refills: 0 | Status: SHIPPED | OUTPATIENT
Start: 2021-08-09 | End: 2021-08-09

## 2021-08-09 RX ORDER — DICYCLOMINE HYDROCHLORIDE 10 MG/1
10 CAPSULE ORAL 4 TIMES DAILY
Qty: 120 CAPSULE | Refills: 3 | Status: SHIPPED | OUTPATIENT
Start: 2021-08-09 | End: 2022-08-17 | Stop reason: CLARIF

## 2021-08-09 NOTE — LETTER
[facility] - [address] [address]  Phone: [phone]  Fax: [phone]    Braulio Meade MD    August 15, 2021     Paulette Baez [address] [facility] [address]    Patient: General Lucero   MR Number: [id number]   YOB: 1950   Date of Visit: 8/9/2021       Dear Yuly Muñoz:    Thank you for referring Vipin Blood to me for evaluation/treatment. Below are the relevant portions of my assessment and plan of care. ASSESSMENT     Diagnosis Orders   1. Epigastric pain     2. History of Crespo's esophagus     3. History of Nissen fundoplication     4. Sigmoid diverticulosis     5. Loose stools     6. Incontinence of feces, unspecified fecal incontinence type     7. BMI 28.0-28.9,adult     8. S/P laparoscopic cholecystectomy     9. Tobacco abuse         PLAN    Discussed at length with Ms. Bales her long history of abdominal pain, bloating, loose stools with occasional incontinence, history of Nissen fundoplication, Crespo's esophagus, diverticulosis, etc.  Prescription for Bentyl provided. We will proceed with EGD and colonoscopy. Risks, benefits, alternatives thoroughly reviewed and accepted by Ms. Bales, including GI bleeding, perforation, missed lesions, COVID-19 exposure/infection, etc.  Pearl diet for now. Discussed importance of tobacco cessation. If you have questions, please do not hesitate to call me. I look forward to following [place] along with you.     Sincerely,    MD Braulio An MD

## 2021-08-15 ASSESSMENT — ENCOUNTER SYMPTOMS
DIARRHEA: 1
TROUBLE SWALLOWING: 0
CHOKING: 0
ABDOMINAL PAIN: 1
BACK PAIN: 1
BLOOD IN STOOL: 0
ABDOMINAL DISTENTION: 1
COUGH: 0
SORE THROAT: 0
VOMITING: 0
SHORTNESS OF BREATH: 0
NAUSEA: 0

## 2021-08-15 NOTE — PROGRESS NOTES
Cathie Rock MD  General Surgery, Endoscopy  Chief Medical Officer    [facility] Awilda Mittal  [address] [address]  Dept: [phone]  Fax: [address]  Chief Complaint   Patient presents with    New Patient    Abdominal Pain     Patient referred by Magali Oquendo NP for epigastric pain. History of Crespo's esophagus. EGD 05/2017. Gastric fundoplication 1628. No known history of COVID-19, patient is fully vaccinated. HPI    Ms Elaine Rodriguez is a delightful 22-year-old white female kindly referred to me by Magali Oquendo CNP for evaluation of epigastric pain x2 months. Although living in [place], her symptoms have worsened enough that she has driven to [facility] for evaluation today. CT scan abdomen pelvis March 2021 showing nonobstructing bilateral nephrolithiasis, sigmoid diverticulosis, benign left renal cysts, with no acute process. She is status post Nissen fundoplication 9590. EGD and colonoscopy May 2017 with biopsy-proven Crespo's esophagus. Reports and pathology are unavailable at this time. No current symptoms of heartburn. No dysphagia. Taking Mobic and Neurontin for pain. She has had loose stools that are jellylike and oily, with occasional fecal incontinence. No recent weight changes, 196 pounds, BMI 29. No recent sick contacts nor travel. No fevers nor chills. No cough nor other respiratory symptoms. No history of COVID-19, vaccination schedule is complete. She is status post cholecystectomy, appendectomy, and hysterectomy. No family history of colon cancer nor polyps to her knowledge. She continues to smoke 1 pack/day. Review of Systems   Constitutional: Positive for fatigue. Negative for activity change, appetite change, chills, fever and unexpected weight change. HENT: Negative for nosebleeds, sneezing, sore throat and trouble swallowing. Eyes: Negative for visual disturbance.    Respiratory: Negative for cough, choking and shortness of breath. Cardiovascular: Negative for chest pain, palpitations and leg swelling. Gastrointestinal: Positive for abdominal distention, abdominal pain and diarrhea (loose gel like stools, occasional incontinence). Negative for blood in stool, nausea and vomiting. Genitourinary: Negative for dysuria, flank pain and hematuria. Musculoskeletal: Positive for arthralgias and back pain. Negative for gait problem and myalgias. Allergic/Immunologic: Negative for immunocompromised state. Neurological: Negative for dizziness, seizures, syncope, weakness and headaches. Hematological: Does not bruise/bleed easily. Psychiatric/Behavioral: Negative for confusion and sleep disturbance.        Past Medical History:   Diagnosis Date    Arthritis     Crespo's esophagus     Diabetes mellitus ([facility] [address]     Hypertension        Past Surgical History:   Procedure Laterality Date    APPENDECTOMY  1969    BACK SURGERY  1994    upper disc    BACK SURGERY  2019    protruded disc    CHOLECYSTECTOMY      1988    COLONOSCOPY  05/2017    CYST REMOVAL  1965    pilonidal    ENDOSCOPY, COLON, DIAGNOSTIC  07/2019    GASTRIC FUNDOPLICATION  [address]    still has ovaries    JOINT REPLACEMENT Left 2015    HIP    JOINT REPLACEMENT Right     HIP    KNEE ARTHROSCOPY Bilateral 2000    LUMBAR FUSION N/A 01/18/2019    HARDWARE REMOVAL L4-S1, L3-4 DECOMPRESSION AND FUSION EXTENSION TO L3 performed by Phoebe Gipson MD at [address] [facility] Right 06/09/2016    TOTAL KNEE ARTHROPLASTY Right 2002    TOTAL KNEE ARTHROPLASTY Left 2010    UPPER GASTROINTESTINAL ENDOSCOPY  05/2017    VASCULAR SURGERY Bilateral 2006    LEG       Family History   Problem Relation Age of Onset    Diabetes Mother     Heart Disease Mother     Cancer Father     Diabetes Brother     Cancer Brother     High Blood Pressure Sister        Allergies:  See list    Current Outpatient Medications Medication Sig Dispense Refill    dicyclomine (BENTYL) 10 MG capsule Take 1 capsule by mouth 4 times daily 120 capsule 3    metoprolol tartrate (LOPRESSOR) 50 MG tablet TAKE 1 TABLET TWICE A  tablet 0    atorvastatin (LIPITOR) 10 MG tablet Take 1 tablet by mouth daily 90 tablet 1    alendronate (FOSAMAX) 70 MG tablet Take 1 tablet by mouth every 7 days On Thursdays 8 tablet 1    sertraline (ZOLOFT) 50 MG tablet Take 1 tablet by mouth daily 30 tablet 1    meloxicam (MOBIC) 15 MG tablet Take 1 tablet by mouth daily 90 tablet 0    gabapentin (NEURONTIN) 800 MG tablet Take 1 tablet by mouth 2 times daily for 90 days. 180 tablet 1    metFORMIN (GLUCOPHAGE) 500 MG tablet Take 1 tablet by mouth 2 times daily (with meals) 180 tablet 1    vitamin B-12 (CYANOCOBALAMIN) 100 MCG tablet 1,000 mcg       Calcium Carbonate-Vitamin D (OYSTER SHELL CALCIUM/D) 500-200 MG-UNIT TABS Take 1 tablet by mouth 2 times daily (with meals)      aspirin 81 MG EC tablet Take 81 mg by mouth daily      albuterol sulfate HFA (VENTOLIN HFA) 108 (90 Base) MCG/ACT inhaler Inhale 2 puffs into the lungs 4 times daily as needed for Wheezing 1 Inhaler 2    omeprazole (PRILOSEC) 20 MG delayed release capsule Take 1 capsule by mouth 2 times daily (before meals) (Patient not taking: Reported on 8/9/2021) 60 capsule 1     No current facility-administered medications for this visit.        Social History     Socioeconomic History    Marital status:      Spouse name: None    Number of children: None    Years of education: None    Highest education level: None   Occupational History    None   Tobacco Use    Smoking status: Current Every Day Smoker     Packs/day: 1.00     Years: 50.00     Pack years: 50.00     Types: Cigarettes    Smokeless tobacco: Never Used   Vaping Use    Vaping Use: Never assessed   Substance and Sexual Activity    Alcohol use: Yes     Comment: RARELY    Drug use: No    Sexual activity: None   Other Topics Concern    None   Social History Narrative    None     Social Determinants of Health     Financial Resource Strain: Low Risk     Difficulty of Paying Living Expenses: Not hard at all   Food Insecurity: No Food Insecurity    Worried About Running Out of Food in the Last Year: Never true    [place] of Food in the Last Year: Never true   Transportation Needs: No Transportation Needs    Lack of Transportation (Medical): No    Lack of Transportation (Non-Medical): No   Physical Activity:     Days of Exercise per Week:     Minutes of Exercise per Session:    Stress:     Feeling of Stress :    Social Connections:     Frequency of Communication with Friends and Family:     Frequency of Social Gatherings with Friends and Family:     Attends Jainism Services:     Active Member of Clubs or Organizations:     Attends Club or Organization Meetings:     Marital Status:    Intimate Partner Violence:     Fear of Current or Ex-Partner:     Emotionally Abused:     Physically Abused:     Sexually Abused:        Pulse 60   Temp 97.4 °F (36.3 °C) (Infrared)   Resp 18   Ht 5' 9\" (1.753 m)   Wt 196 lb (88.9 kg)   BMI 28.94 kg/m²      Physical Exam  Vitals and nursing note reviewed. Constitutional:       Appearance: She is well-developed. HENT:      Head: Normocephalic and atraumatic. Eyes:      General: No scleral icterus. Conjunctiva/sclera: Conjunctivae normal.      Pupils: Pupils are equal, round, and reactive to light. Neck:      Vascular: No JVD. Trachea: No tracheal deviation. Cardiovascular:      Rate and Rhythm: Normal rate and regular rhythm. Pulmonary:      Effort: Pulmonary effort is normal. No respiratory distress. Chest:      Chest wall: No tenderness. Abdominal:      General: There is no distension. Palpations: Abdomen is soft. There is no mass. Tenderness: There is no abdominal tenderness. There is no guarding or rebound.    Musculoskeletal: General: Normal range of motion. Cervical back: Normal range of motion and neck supple. Lymphadenopathy:      Cervical: No cervical adenopathy. Skin:     General: Skin is warm and dry. Findings: No erythema or rash. Neurological:      Mental Status: She is alert and oriented to person, place, and time. Cranial Nerves: No cranial nerve deficit. Psychiatric:         Behavior: Behavior normal.         Thought Content: Thought content normal.         Judgment: Judgment normal.         IMAGING/LABS    CT ABDOMEN PELVIS W WO CONTRAST Additional Contrast? None  Status: Final result   Order Providers    Authorizing Encounter Billing   MURRAY Santillan CNP [facility] CAT SCAN ROOM Vika Zamora MD   Replaced: CT ABDOMEN PELVIS WO CONTRAST Additional Contrast? Radiologist Recommendation          Signed by    Signed Date/Time Phone Pager   [facility], [address] [place] [address] 3/30/2021  8:54 [phone]    Reading Providers    Read Date Phone Pager   [facility], [address] [facility] Mar 30, 2021  8:54 [phone]    All Reviewers List    Kathy Quinones MD on 4/2/2021 11:34   MURRAY Santillan CNP on 3/31/2021 15:36   Sergei Poole on 3/31/2021 15:25   CT ABDOMEN PELVIS W WO CONTRAST Additional Contrast? None: Result Notes   Dallas Slater   3/31/2021  3:25 PM EDT       PT NOTIFIED    MURRAY Montes De Oca CNP   3/31/2021  3:03 PM EDT       Please notify CT shows that the kidney mass in question are two benign simple cysts. As long as she remains asymptomatic, we will monitor these with no intervention. CT also shows non-obstructive kidney stones. Increase water intake and notify office if any abdominal pain, flank pain, nausea, vomiting, or urinary symptoms.  Also shows diverticulosis which does not require any treatment at this time.          Radiation Dose Estimates    No radiation information found for this patient   Narrative   EXAMINATION:   CT OF THE ABDOMEN AND PELVIS WITH AND WITHOUT CONTRAST 3/30/2021 with post-surgical   changes of the lower lumbar spine.           Impression   The left renal lesion in question represents two adjacent benign simple cysts.       Nonobstructing left renal calculi are again noted, along with sigmoid   diverticulosis.         CT ABDOMEN PELVIS WO CONTRAST Additional Contrast? None  Status: Final result   Order Providers    MD Carolina Washburn MD          Signed by    Signed Date/Time Phone Pager   Josef Daleynickie 3/19/2021  9:13 [phone]    Reading Providers    Read Date Phone Pager   Josef Daleynickie Fri Mar 19, 2021  9:13 [phone]    Radiation Dose Estimates    No radiation information found for this patient   Narrative   EXAMINATION:   CT OF THE ABDOMEN AND PELVIS WITHOUT CONTRAST 3/19/2021 8:45 pm       TECHNIQUE:   CT of the abdomen and pelvis was performed without the administration of   intravenous contrast. Multiplanar reformatted images are provided for review. Dose modulation, iterative reconstruction, and/or weight based adjustment of   the mA/kV was utilized to reduce the radiation dose to as low as reasonably   achievable.       COMPARISON:   None.       HISTORY:   ORDERING SYSTEM PROVIDED HISTORY: Pain, kidney stones, history of lumbar disc   disease   TECHNOLOGIST PROVIDED HISTORY:   Pain, kidney stones, history of lumbar disc disease       Decision Support Exception->Emergency Medical Condition (MA)       FINDINGS:   Lower Chest: Lung bases are clear       Organs: There is a left renal lesion measuring at least 7.1 x 4.1 cm size.    This measures 23 HU and is indeterminate.  Bilateral renal calculi which are   nonobstructive.  No definite hydronephrosis.       Otherwise the noncontrast appearance of the liver, spleen, adrenal glands,   pancreas unremarkable.  Gallbladder is absent.       GI/Bowel: No bowel obstruction.  Mild retained stool.  Colonic diverticulosis   noted.  Appendix absent.       Pelvis: Beam hardening artifact arising from bilateral hip arthroplasties   challenge evaluation of pelvic contents.  Bladder is grossly unremarkable as   visualized.  Uterus is absent.  No suspicious adnexal mass.       Peritoneum/Retroperitoneum: No free air or free fluid.  Aortic vascular   calcifications.       Bones/Soft Tissues: There is a small fat containing ventral abdominal hernia. Of hip arthroplasties.  Evidence of multilevel posterior fusion decompressive   laminectomy involving the lumbar spine L3 through L5.  Prior fusion at L5-S1.           Impression   No acute inflammatory process or bowel obstruction.       Bilateral nephrolithiasis.  No hydronephrosis or obstructive uropathy.       Indeterminate left renal lesion up to 7 cm in size.  Consider dedicated renal   protocol CT further characterization on outpatient basis if this may change   patient management. ASSESSMENT     Diagnosis Orders   1. Epigastric pain     2. History of Crespo's esophagus     3. History of Nissen fundoplication     4. Sigmoid diverticulosis     5. Loose stools     6. Incontinence of feces, unspecified fecal incontinence type     7. BMI 28.0-28.9,adult     8. S/P laparoscopic cholecystectomy     9. Tobacco abuse         PLAN    Discussed at length with Ms. Bales her long history of abdominal pain, bloating, loose stools with occasional incontinence, history of Nissen fundoplication, Crespo's esophagus, diverticulosis, etc.  Prescription for Bentyl provided. We will proceed with EGD and colonoscopy. Risks, benefits, alternatives thoroughly reviewed and accepted by Ms. Bales, including GI bleeding, perforation, missed lesions, COVID-19 exposure/infection, etc.  [place] diet for now. Discussed importance of tobacco cessation.      Cathie Rock MD

## 2021-08-15 NOTE — PATIENT INSTRUCTIONS
Patient Education        Upper GI Endoscopy: Before Your Procedure  What is an upper GI endoscopy? An upper gastrointestinal (or GI) endoscopy is a test that allows your doctor to look at the inside of your esophagus, stomach, and the first part of your small intestine, called the duodenum. The esophagus is the tube that carries food to your stomach. The doctor uses a thin, lighted tube that bends. It is called an endoscope, or scope. The doctor puts the tip of the scope in your mouth and gently moves it down your throat. The scope is a flexible video camera. The doctor looks at a monitor (like a TV set or a computer screen) as he or she moves the scope. A doctor may do this procedure to look for ulcers, tumors, infection, or bleeding. It also can be used to look for signs of acid backing up into your esophagus. This is called gastroesophageal reflux disease, or GERD. The doctor can use the scope to take a sample of tissue for study (a biopsy). The doctor also can use the scope to take out growths or stop bleeding. Follow-up care is a key part of your treatment and safety. Be sure to make and go to all appointments, and call your doctor if you are having problems. It's also a good idea to know your test results and keep a list of the medicines you take. How do you prepare for the procedure? Procedures can be stressful. This information will help you understand what you can expect. And it will help you safely prepare for your procedure. Preparing for the procedure    · Do not eat or drink anything for 6 to 8 hours before the test. An empty stomach helps your doctor see your stomach clearly during the test. It also reduces your chances of vomiting. If you vomit, there is a small risk that the vomit could enter your lungs.  (This is called aspiration.) If the test is done in an emergency, a tube may be inserted through your nose or mouth to empty your stomach.     · Do not take sucralfate (Carafate) or antacids on the day of the test. These medicines can make it hard for your doctor to see your upper GI tract.     · If your doctor tells you to, stop taking iron supplements 7 to 14 days before the test.     · Be sure you have someone to take you home. Anesthesia and pain medicine will make it unsafe for you to drive or get home on your own.     · Understand exactly what procedure is planned, along with the risks, benefits, and other options. · Tell your doctor ALL the medicines, vitamins, supplements, and herbal remedies you take. Some may increase the risk of problems during your procedure. Your doctor will tell you if you should stop taking any of them before the procedure and how soon to do it.     · If you take aspirin or some other blood thinner, ask your doctor if you should stop taking it before your procedure. Make sure that you understand exactly what your doctor wants you to do. These medicines increase the risk of bleeding.     · Make sure your doctor and the hospital have a copy of your advance directive. If you don't have one, you may want to prepare one. It lets others know your health care wishes. It's a good thing to have before any type of surgery or procedure. What happens on the day of the procedure? · Follow the instructions exactly about when to stop eating and drinking. If you don't, your procedure may be canceled. If your doctor told you to take your medicines on the day of the procedure, take them with only a sip of water.     · Take a bath or shower before you come in for your procedure. Do not apply lotions, perfumes, deodorants, or nail polish.     · Take off all jewelry and piercings. And take out contact lenses, if you wear them. At the hospital or surgery center   · Bring a picture ID.     · The test may take 15 to 30 minutes.     · The doctor may spray medicine on the back of your throat to numb it.  You also will get medicine to prevent pain and to relax you.     · You will lie on your left side. The doctor will put the scope in your mouth and toward the back of your throat. The doctor will tell you when to swallow. This helps the scope move down your throat. You will be able to breathe normally. The doctor will move the scope down your esophagus into your stomach. The doctor also may look at the duodenum.     · If your doctor wants to take a sample of tissue for a biopsy, he or she may use small surgical tools, which are put into the scope, to cut off some tissue. You will not feel a biopsy, if one is taken. The doctor also can use the tools to stop bleeding or to do other treatments, if needed.     · You will stay at the hospital or surgery center for 1 to 2 hours until the medicine you were given wears off. What happens after an upper GI endoscopy? · After the test, you may belch and feel bloated for a while.     · You may have a tickling, dry throat or mouth. You may feel a bit hoarse, and you may have a mild sore throat. These symptoms may last several days. Throat lozenges and warm saltwater gargles can help relieve the throat symptoms.     · Don't drive or operate machinery for 12 hours after the test.     · Your doctor will tell you when you can go back to your usual diet and activities.     · Don't drink alcohol for 12 to 24 hours after the test.   When should you call your doctor? · You have questions or concerns.     · You don't understand how to prepare for your procedure.     · You become ill before the procedure (such as fever, flu, or a cold).     · You need to reschedule or have changed your mind about having the procedure. Where can you learn more? Go to [email or web address] org and sign in to your [employer] account. Enter P790 in the [employer] box to learn more about \"Upper GI Endoscopy: Before Your Procedure. \"     If you do not have an account, please click on the \"Sign Up Now\" link.   Current as of: February 10, 2021               Content Version: 12.9  © 5403-7961 [employer]. Care instructions adapted under license by [facility] ([facility]). If you have questions about a medical condition or this instruction, always ask your healthcare professional. [address] any warranty or liability for your use of this information. Patient Education        Learning About Colonoscopy  What is a colonoscopy? A colonoscopy is a test (also called a procedure) that lets a doctor look inside your large intestine. The doctor uses a thin, lighted tube called a colonoscope. The doctor uses it to look for small growths called polyps, colon or rectal cancer (colorectal cancer), or other problems like bleeding. During the procedure, the doctor can take samples of tissue. The samples can then be checked for cancer or other conditions. The doctor can also take out polyps. How is a colonoscopy done? This procedure is done in a doctor's office or a clinic or hospital. You will get medicine to help you relax and not feel pain. Some people find that they don't remember having the test because of the medicine. The doctor gently moves the colonoscope, or scope, through the colon. The scope is also a small video camera. It lets the doctor see the colon and take pictures. How do you prepare for the procedure? You need to clean out your colon before the procedure so the doctor can see your colon. This depends on which \"colon prep\" your doctor recommends. To clean out your colon, you'll do a \"colon prep\" before the test. This means you stop eating solid foods and drink only clear liquids. You can have water, tea, coffee, clear juices, clear broths, flavored ice pops, and gelatin (such as Jell-O). Do not drink anything red or purple. The day or night before the procedure, you drink a large amount of a special liquid. This causes loose, frequent stools. You will go to the bathroom a lot.  Your doctor may have you drink part of the liquid the evening before and the rest on the day of the test. It's very important to drink all of the liquid. If you have problems drinking it, call your doctor. Arrange to have someone take you home after the test.  What can you expect after a colonoscopy? Your doctor will tell you when you can eat and do your usual activities. Drink a lot of fluid after the test to replace the fluids you may have lost during the colon prep. But don't drink alcohol. Your doctor will talk to you about when you'll need your next colonoscopy. The results of your test and your risk for colorectal cancer will help your doctor decide how often you need to be checked. After the test, you may be bloated or have gas pains. You may need to pass gas. If a biopsy was done or a polyp was removed, you may have streaks of blood in your stool (feces) for a few days. Check with your doctor to see when it is safe to take aspirin and nonsteroidal anti-inflammatory drugs (NSAIDs) again. Problems such as heavy rectal bleeding may not occur until several weeks after the test. This isn't common. But it can happen after polyps are removed. Follow-up care is a key part of your treatment and safety. Be sure to make and go to all appointments, and call your doctor if you are having problems. It's also a good idea to know your test results and keep a list of the medicines you take. Where can you learn more? Go to https://G[employer] Technology[employer]deImagga.Met[employer]x. org and sign in to your [employer] account. Enter G114 in the [facility] box to learn more about \"Learning About Colonoscopy. \"     If you do not have an account, please click on the \"Sign Up Now\" link. Current as of: December 17, 2020               Content Version: 12.9  © 0583-3799 [employer]. Care instructions adapted under license by [facility] ([facility]).  If you have questions about a medical condition or this instruction, always ask your healthcare professional. Adelita Sawyer disclaims any warranty or liability for your use of this information.

## 2021-08-24 RX ORDER — ALENDRONATE SODIUM 70 MG/1
70 TABLET ORAL
Qty: 8 TABLET | Refills: 1 | Status: SHIPPED | OUTPATIENT
Start: 2021-08-24 | End: 2022-03-01

## 2021-08-24 RX ORDER — ALENDRONATE SODIUM 70 MG/1
70 TABLET ORAL
Qty: 8 TABLET | Refills: 1 | Status: CANCELLED | OUTPATIENT
Start: 2021-08-24

## 2021-08-24 NOTE — TELEPHONE ENCOUNTER
Pt calling asking if can be sent to mail order, thank you. Health Maintenance   Topic Date Due    Diabetic retinal exam  Never done    Low dose CT lung screening  Never done    Annual Wellness Visit (AWV)  Never done    DTaP/Tdap/Td vaccine (1 - Tdap) 03/23/2022 (Originally 9/7/1969)    Shingles Vaccine (1 of 2) 03/23/2022 (Originally 9/7/2000)    Hepatitis C screen  03/23/2022 (Originally 1950)    Flu vaccine (1) 09/01/2021    A1C test (Diabetic or Prediabetic)  03/25/2022    Diabetic microalbuminuria test  03/25/2022    Lipid screen  03/25/2022    Diabetic foot exam  06/23/2022    Potassium monitoring  08/04/2022    Creatinine monitoring  08/04/2022    Breast cancer screen  05/03/2023    Colon cancer screen colonoscopy  07/31/2029    DEXA (modify frequency per FRAX score)  Completed    Pneumococcal 65+ years Vaccine  Completed    COVID-19 Vaccine  Completed    Hepatitis A vaccine  Aged Out    Hib vaccine  Aged Out    Meningococcal (ACWY) vaccine  Aged Out             (applicable per patient's age: Cancer Screenings, Depression Screening, Fall Risk Screening, Immunizations)    Hemoglobin A1C (%)   Date Value   03/25/2021 6.8 (H)   01/20/2019 6.8 (H)     Microalb/Crt.  Ratio (mcg/mg creat)   Date Value   03/25/2021 11     LDL Cholesterol (mg/dL)   Date Value   03/25/2021 49     AST (U/L)   Date Value   03/25/2021 14     ALT (U/L)   Date Value   03/25/2021 13     BUN (mg/dL)   Date Value   08/04/2021 19      (goal A1C is < 7)   (goal LDL is <100) need 30-50% reduction from baseline     BP Readings from Last 3 Encounters:   08/04/21 120/65   07/20/21 110/68   06/23/21 100/70    (goal /80)      All Future Testing planned in [facility]:  Lab Frequency Next Occurrence   ALT Once 01/20/2022   AST Once 36/40/3404   Basic Metabolic Panel Once 24/93/6333   CBC Once 01/20/2022   Hemoglobin A1C Once 01/20/2022   Vitamin D 25 Hydroxy Once 01/20/2022       Next Visit Date:  Future Appointments   Date Time Provider Jn Whitfield   9/7/2021  2:00 PM Sadie Ceja MD Tiff surg [facility]   12/6/2021  3:00 PM Gino Kelly MD TIFF NEURO [facility]   1/19/2022 10:00 AM MURRAY Barrow - CNP TIFF FAM MED [facility]            Patient Active Problem List:     Primary osteoarthritis of right hip     Essential (primary) hypertension     Tobacco abuse     Spinal stenosis of lumbar region at multiple levels     Type 2 diabetes mellitus, without long-term current use of insulin ([address]     Long term prescription opiate use     Osteoarthritis     Fracture of tooth     Herniated lumbar intervertebral disc     History of total hip replacement     Low back pain     Neuropathy     Noninfectious otitis externa of left ear     Obesity     Osteoarthritis of thoracic spine     Spondylosis of lumbar spine     Osteopenia     Anxiety     Depression

## 2021-08-30 ENCOUNTER — ANESTHESIA EVENT (OUTPATIENT)
Dept: OPERATING ROOM | Age: 71
End: 2021-08-30
Payer: COMMERCIAL

## 2021-08-31 ENCOUNTER — ANESTHESIA (OUTPATIENT)
Dept: OPERATING ROOM | Age: 71
End: 2021-08-31
Payer: COMMERCIAL

## 2021-08-31 ENCOUNTER — HOSPITAL ENCOUNTER (OUTPATIENT)
Age: 71
Setting detail: OUTPATIENT SURGERY
Discharge: HOME | End: 2021-08-31
Attending: SURGERY | Admitting: SURGERY
Payer: COMMERCIAL

## 2021-08-31 VITALS
HEART RATE: 76 BPM | TEMPERATURE: 96.9 F | RESPIRATION RATE: 14 BRPM | WEIGHT: 188 LBS | BODY MASS INDEX: 27.85 KG/M2 | OXYGEN SATURATION: 95 % | SYSTOLIC BLOOD PRESSURE: 139 MMHG | DIASTOLIC BLOOD PRESSURE: 82 MMHG | HEIGHT: 69 IN

## 2021-08-31 VITALS — OXYGEN SATURATION: 95 % | DIASTOLIC BLOOD PRESSURE: 52 MMHG | TEMPERATURE: 98.1 F | SYSTOLIC BLOOD PRESSURE: 106 MMHG

## 2021-08-31 PROBLEM — R10.13 EPIGASTRIC PAIN: Status: ACTIVE | Noted: 2021-08-31

## 2021-08-31 PROCEDURE — 2580000003 HC RX 258: Performed by: SURGERY

## 2021-08-31 PROCEDURE — 3700000001 HC ADD 15 MINUTES (ANESTHESIA): Performed by: SURGERY

## 2021-08-31 PROCEDURE — 7100000010 HC PHASE II RECOVERY - FIRST 15 MIN: Performed by: SURGERY

## 2021-08-31 PROCEDURE — 3609027000 HC COLONOSCOPY: Performed by: SURGERY

## 2021-08-31 PROCEDURE — 7100000011 HC PHASE II RECOVERY - ADDTL 15 MIN: Performed by: SURGERY

## 2021-08-31 PROCEDURE — 6360000002 HC RX W HCPCS: Performed by: NURSE ANESTHETIST, CERTIFIED REGISTERED

## 2021-08-31 PROCEDURE — 3700000000 HC ANESTHESIA ATTENDED CARE: Performed by: SURGERY

## 2021-08-31 PROCEDURE — 2709999900 HC NON-CHARGEABLE SUPPLY: Performed by: SURGERY

## 2021-08-31 PROCEDURE — 2500000003 HC RX 250 WO HCPCS: Performed by: NURSE ANESTHETIST, CERTIFIED REGISTERED

## 2021-08-31 PROCEDURE — 3609012400 HC EGD TRANSORAL BIOPSY SINGLE/MULTIPLE: Performed by: SURGERY

## 2021-08-31 PROCEDURE — 88305 TISSUE EXAM BY PATHOLOGIST: CPT

## 2021-08-31 PROCEDURE — 88342 IMHCHEM/IMCYTCHM 1ST ANTB: CPT

## 2021-08-31 RX ORDER — SODIUM CHLORIDE 9 MG/ML
25 INJECTION, SOLUTION INTRAVENOUS PRN
Status: DISCONTINUED | OUTPATIENT
Start: 2021-08-31 | End: 2021-08-31 | Stop reason: HOSPADM

## 2021-08-31 RX ORDER — SODIUM CHLORIDE, SODIUM LACTATE, POTASSIUM CHLORIDE, CALCIUM CHLORIDE 600; 310; 30; 20 MG/100ML; MG/100ML; MG/100ML; MG/100ML
INJECTION, SOLUTION INTRAVENOUS CONTINUOUS
Status: DISCONTINUED | OUTPATIENT
Start: 2021-08-31 | End: 2021-08-31 | Stop reason: HOSPADM

## 2021-08-31 RX ORDER — SODIUM CHLORIDE 0.9 % (FLUSH) 0.9 %
5-40 SYRINGE (ML) INJECTION PRN
Status: DISCONTINUED | OUTPATIENT
Start: 2021-08-31 | End: 2021-08-31 | Stop reason: HOSPADM

## 2021-08-31 RX ORDER — ONDANSETRON 2 MG/ML
4 INJECTION INTRAMUSCULAR; INTRAVENOUS
Status: DISCONTINUED | OUTPATIENT
Start: 2021-08-31 | End: 2021-08-31 | Stop reason: HOSPADM

## 2021-08-31 RX ORDER — PROPOFOL 10 MG/ML
INJECTION, EMULSION INTRAVENOUS PRN
Status: DISCONTINUED | OUTPATIENT
Start: 2021-08-31 | End: 2021-08-31 | Stop reason: SDUPTHER

## 2021-08-31 RX ORDER — SUCRALFATE 1 G/1
1 TABLET ORAL 4 TIMES DAILY
Qty: 120 TABLET | Refills: 3 | Status: SHIPPED | OUTPATIENT
Start: 2021-08-31 | End: 2022-08-17 | Stop reason: CLARIF

## 2021-08-31 RX ORDER — SODIUM CHLORIDE 0.9 % (FLUSH) 0.9 %
10 SYRINGE (ML) INJECTION EVERY 12 HOURS SCHEDULED
Status: DISCONTINUED | OUTPATIENT
Start: 2021-08-31 | End: 2021-08-31 | Stop reason: HOSPADM

## 2021-08-31 RX ORDER — LIDOCAINE HYDROCHLORIDE 20 MG/ML
INJECTION, SOLUTION EPIDURAL; INFILTRATION; INTRACAUDAL; PERINEURAL PRN
Status: DISCONTINUED | OUTPATIENT
Start: 2021-08-31 | End: 2021-08-31 | Stop reason: SDUPTHER

## 2021-08-31 RX ORDER — SODIUM CHLORIDE 0.9 % (FLUSH) 0.9 %
5-40 SYRINGE (ML) INJECTION EVERY 12 HOURS SCHEDULED
Status: DISCONTINUED | OUTPATIENT
Start: 2021-08-31 | End: 2021-08-31 | Stop reason: HOSPADM

## 2021-08-31 RX ORDER — SODIUM CHLORIDE 0.9 % (FLUSH) 0.9 %
10 SYRINGE (ML) INJECTION PRN
Status: DISCONTINUED | OUTPATIENT
Start: 2021-08-31 | End: 2021-08-31 | Stop reason: HOSPADM

## 2021-08-31 RX ORDER — FENTANYL CITRATE 50 UG/ML
25 INJECTION, SOLUTION INTRAMUSCULAR; INTRAVENOUS EVERY 5 MIN PRN
Status: DISCONTINUED | OUTPATIENT
Start: 2021-08-31 | End: 2021-08-31 | Stop reason: HOSPADM

## 2021-08-31 RX ADMIN — SODIUM CHLORIDE, POTASSIUM CHLORIDE, SODIUM LACTATE AND CALCIUM CHLORIDE: 600; 310; 30; 20 INJECTION, SOLUTION INTRAVENOUS at 10:27

## 2021-08-31 RX ADMIN — LIDOCAINE HYDROCHLORIDE 4 ML: 20 INJECTION, SOLUTION EPIDURAL; INFILTRATION; INTRACAUDAL; PERINEURAL at 11:34

## 2021-08-31 RX ADMIN — PROPOFOL 125 MCG/KG/MIN: 10 INJECTION, EMULSION INTRAVENOUS at 11:35

## 2021-08-31 RX ADMIN — PROPOFOL 50 MG: 10 INJECTION, EMULSION INTRAVENOUS at 11:34

## 2021-08-31 ASSESSMENT — LIFESTYLE VARIABLES: SMOKING_STATUS: 1

## 2021-08-31 ASSESSMENT — PAIN SCALES - GENERAL
PAINLEVEL_OUTOF10: 0
PAINLEVEL_OUTOF10: 0

## 2021-08-31 NOTE — OP NOTE
scope was removed. The patient tolerated the procedure  well and was transferred to PACU in stable condition. SPECIMENS:  1. Four-quadrant GE junction biopsies. 2.  Prepyloric antral biopsies. DRAINS:  None. COMPLICATIONS:  None. DISPOSITION:  To PACU awake, alert, and stable. Following recovery, we  will discharge the patient home with instructions for a healthy balanced  bland diet with increased water intake. We will continue proton-pump  inhibition. Prescription for Carafate provided. Consider addition of  Questran if the patient's loose stools persist.  Recommend interval EGD  every two years with the patient's history of biopsy-proven Crespo's  esophagus. Recommend next screening colonoscopy in five years given her  long history of tobacco use with subsequent increased risk for colon  cancer. Strongly encourage complete tobacco cessation. Followup will  be with me in one to two weeks to review endoscopy.         BIANKA eTresa    D: 08/31/2021 12:21:03       T: 08/31/2021 12:24:27     [email or web address]  Job#: [id number]     Doc#: [id number]    CC:  Jill Gandara

## 2021-08-31 NOTE — ANESTHESIA POSTPROCEDURE EVALUATION
Department of Anesthesiology  Postprocedure Note    Patient: Alma Tavarez  MRN: [id number]  YOB: 1950  Date of evaluation: 8/31/2021  Time:  1:45 PM     Procedure Summary     Date: 08/31/21 Room / Location: [address] [place] [address]    Anesthesia Start: 7216 Anesthesia Stop: 1218    Procedures:       COLORECTAL CANCER SCREENING, NOT HIGH RISK (N/A )      EGD BIOPSY (N/A ) Diagnosis: (SCREENING, DYSPHAGIA)    Surgeons: Oracio Esparza MD Responsible Provider: MURRAY Schuster CRNA    Anesthesia Type: general ASA Status: 3          Anesthesia Type: general    Norbert Phase I: Norbert Score: 10    Norbert Phase II: Norbert Score: 10    Last vitals: Reviewed and per EMR flowsheets.        Anesthesia Post Evaluation    Patient location during evaluation: bedside  Patient participation: complete - patient participated  Level of consciousness: awake and alert  Pain score: 0  Airway patency: patent  Nausea & Vomiting: no nausea and no vomiting  Complications: no  Cardiovascular status: hemodynamically stable  Respiratory status: acceptable  Hydration status: stable

## 2021-08-31 NOTE — PROGRESS NOTES
Patient verbalizes readiness for discharge. Discharge instructions given to patient and responsible adult, answered all questions, and verbalized understanding of discharge instructions. Discharge Criteria    Inpatients must meet Criteria 1 through 7. All other patients are either YES or N/A. If a NO is chosen then Anesthesia or Surgeon must be notified. 1.  Minimum 30 minutes after last dose of sedative medication, minimum 120 minutes after last dose of reversal agent. Yes      2. Systolic BP stable within 20 mmHg for 30 minutes & systolic BP between 90 & 419 or within 10 mmHg of baseline. Yes      3. Pulse between 60 and 100 or within 10 bpm of baseline. Yes      4. Spontaneous respiratory rate >/= 10 per minute. Yes      5. SaO2 >/= 95 or  >/= baseline. Yes      6. Able to cough and swallow or return to baseline function. Yes      7. Alert and oriented or return to baseline mental status. Yes      8. Demonstrates controlled, coordinated movements, ambulates with steady gait, or return to baseline activity function. Yes      9. Minimal or no pain or nausea, or at a level tolerable and acceptable to patient. Yes      10. Takes and retains oral fluids as allowed. Yes      11. Procedural / perioperative site stable. Minimal or no bleeding. Yes          12. If GI endoscopy procedure, minimal or no abdominal distention or passing flatus. Yes      13. Written discharge instructions and emergency telephone number provided. Yes      14. Accompanied by a responsible adult.     Yes

## 2021-08-31 NOTE — BRIEF OP NOTE
Brief Postoperative Note      Patient: [place]  YOB: 1950  MRN: [id number]    Date of Procedure: 8/31/2021    Pre-Op Diagnosis:      1. Epigastric pain     2. History Crespo's esophagus      3. History Nissen fundoplication     4. Loose stools     5. BMI 28     6. History lap paco, appendectomy, hysterectomy     7. Tobacco abuse    Post-Op Diagnosis:      1. Distal esophagitis, LA grade A     2. Diffuse, superficial erosive gastritis     3. Sigmoid diverticulosis       Operation:     1. EGD     2.  4 quadrant GE junction biopsies     3. Antral biopsies     4. Colonoscopy anus to cecum    Surgeon(s):  Hunter Upton MD    Assistant:  * No surgical staff found *    Anesthesia: Monitor Anesthesia Care    Estimated Blood Loss (mL): less than 24RF     Complications: None    Specimens:   ID Type Source Tests Collected by Time Destination   A :  Tissue Stomach SURGICAL PATHOLOGY Hunter Upton MD 8/31/2021 1143    B :  Tissue GE Junction Biopsy SURGICAL PATHOLOGY Hunter Upton MD 8/31/2021 1202      Findings:  As above.     Dictated # [id number]    Electronically signed by Hunter Upton MD on 8/31/2021 at 12:11 PM

## 2021-08-31 NOTE — H&P
HPI     Ms Vessie Dakins is a delightful 60-year-old white female kindly referred to me by Jeancarlos Henley CNP for evaluation of epigastric pain x2 months. Although living in [place], her symptoms have worsened enough that she has driven to [facility] for evaluation today. CT scan abdomen pelvis March 2021 showing nonobstructing bilateral nephrolithiasis, sigmoid diverticulosis, benign left renal cysts, with no acute process. She is status post Nissen fundoplication 7185. EGD and colonoscopy May 2017 with biopsy-proven Crespo's esophagus. Reports and pathology are unavailable at this time. No current symptoms of heartburn. No dysphagia. Taking Mobic and Neurontin for pain. She has had loose stools that are jellylike and oily, with occasional fecal incontinence. No recent weight changes, 196 pounds, BMI 29. No recent sick contacts nor travel. No fevers nor chills. No cough nor other respiratory symptoms. No history of COVID-19, vaccination schedule is complete. She is status post cholecystectomy, appendectomy, and hysterectomy. No family history of colon cancer nor polyps to her knowledge. She continues to smoke 1 pack/day.     Review of Systems   Constitutional: Positive for fatigue. Negative for activity change, appetite change, chills, fever and unexpected weight change. HENT: Negative for nosebleeds, sneezing, sore throat and trouble swallowing. Eyes: Negative for visual disturbance. Respiratory: Negative for cough, choking and shortness of breath. Cardiovascular: Negative for chest pain, palpitations and leg swelling. Gastrointestinal: Positive for abdominal distention, abdominal pain and diarrhea (loose gel like stools, occasional incontinence). Negative for blood in stool, nausea and vomiting. Genitourinary: Negative for dysuria, flank pain and hematuria. Musculoskeletal: Positive for arthralgias and back pain. Negative for gait problem and myalgias.    Allergic/Immunologic: Negative for immunocompromised state. Neurological: Negative for dizziness, seizures, syncope, weakness and headaches. Hematological: Does not bruise/bleed easily.    Psychiatric/Behavioral: Negative for confusion and sleep disturbance.         Past Medical History   Past Medical History:   Diagnosis Date    Arthritis      Crespo's esophagus      Diabetes mellitus ([address]      Hypertension              Past Surgical History         Past Surgical History:   Procedure Laterality Date    APPENDECTOMY   1969    BACK SURGERY   1994     upper disc    BACK SURGERY   2019     protruded disc    CHOLECYSTECTOMY         1988    COLONOSCOPY   05/2017    CYST REMOVAL   1965     pilonidal    ENDOSCOPY, COLON, DIAGNOSTIC   07/2019    GASTRIC FUNDOPLICATION   0885    HYSTERECTOMY   1998     still has ovaries    JOINT REPLACEMENT Left 2015     HIP    JOINT REPLACEMENT Right       HIP    KNEE ARTHROSCOPY Bilateral 2000    LUMBAR FUSION N/A 01/18/2019     HARDWARE REMOVAL L4-S1, L3-4 DECOMPRESSION AND FUSION EXTENSION TO L3 performed by Ramiro Jimenez MD at [address] [place] [address] Right 06/09/2016    TOTAL KNEE ARTHROPLASTY Right 2002    TOTAL KNEE ARTHROPLASTY Left 2010    UPPER GASTROINTESTINAL ENDOSCOPY   05/2017    VASCULAR SURGERY Bilateral 2006     LEG            Family History         Family History   Problem Relation Age of Onset    Diabetes Mother      Heart Disease Mother      Cancer Father      Diabetes Brother      Cancer Brother      High Blood Pressure Sister              Allergies:  See list     Current Facility-Administered Medications          Current Outpatient Medications   Medication Sig Dispense Refill    dicyclomine (BENTYL) 10 MG capsule Take 1 capsule by mouth 4 times daily 120 capsule 3    metoprolol tartrate (LOPRESSOR) 50 MG tablet TAKE 1 TABLET TWICE A  tablet 0    atorvastatin (LIPITOR) 10 MG tablet Take 1 tablet by mouth daily 90 tablet 1    alendronate (FOSAMAX) 70 MG tablet Take 1 tablet by mouth every 7 days On Thursdays 8 tablet 1    sertraline (ZOLOFT) 50 MG tablet Take 1 tablet by mouth daily 30 tablet 1    meloxicam (MOBIC) 15 MG tablet Take 1 tablet by mouth daily 90 tablet 0    gabapentin (NEURONTIN) 800 MG tablet Take 1 tablet by mouth 2 times daily for 90 days. 180 tablet 1    metFORMIN (GLUCOPHAGE) 500 MG tablet Take 1 tablet by mouth 2 times daily (with meals) 180 tablet 1    vitamin B-12 (CYANOCOBALAMIN) 100 MCG tablet 1,000 mcg         Calcium Carbonate-Vitamin D (OYSTER SHELL CALCIUM/D) 500-200 MG-UNIT TABS Take 1 tablet by mouth 2 times daily (with meals)        aspirin 81 MG EC tablet Take 81 mg by mouth daily        albuterol sulfate HFA (VENTOLIN HFA) 108 (90 Base) MCG/ACT inhaler Inhale 2 puffs into the lungs 4 times daily as needed for Wheezing 1 Inhaler 2    omeprazole (PRILOSEC) 20 MG delayed release capsule Take 1 capsule by mouth 2 times daily (before meals) (Patient not taking: Reported on 8/9/2021) 60 capsule 1      No current facility-administered medications for this visit.            Social History   Social History      Socioeconomic History    Marital status:        Spouse name: None    Number of children: None    Years of education: None    Highest education level: None   Occupational History    None   Tobacco Use    Smoking status: Current Every Day Smoker       Packs/day: 1.00       Years: 50.00       Pack years: 50.00       Types: Cigarettes    Smokeless tobacco: Never Used   Vaping Use    Vaping Use: Never assessed   Substance and Sexual Activity    Alcohol use:  Yes       Comment: RARELY    Drug use: No    Sexual activity: None   Other Topics Concern    None   Social History Narrative    None      Social Determinants of Health          Financial Resource Strain: Low Risk     Difficulty of Paying Living Expenses: Not hard at all   Food Insecurity: No Food Insecurity    Worried About Running Out of Food in the Last Year: Never true   [address] in the Last Year: Never true   Transportation Needs: No Transportation Needs    Lack of Transportation (Medical): No    Lack of Transportation (Non-Medical): No   Physical Activity:     Days of Exercise per Week:     Minutes of Exercise per Session:    Stress:     Feeling of Stress :    Social Connections:     Frequency of Communication with Friends and Family:     Frequency of Social Gatherings with Friends and Family:     Attends Jewish Services:     Active Member of Clubs or Organizations:     Attends Club or Organization Meetings:     Marital Status:    Intimate Partner Violence:     Fear of Current or Ex-Partner:     Emotionally Abused:     Physically Abused:     Sexually Abused:             Pulse 60   Temp 97.4 °F (36.3 °C) (Infrared)   Resp 18   Ht 5' 9\" (1.753 m)   Wt 196 lb (88.9 kg)   BMI 28.94 kg/m²       Physical Exam  Vitals and nursing note reviewed. Constitutional:       Appearance: She is well-developed. HENT:      Head: Normocephalic and atraumatic. Eyes:      General: No scleral icterus. Conjunctiva/sclera: Conjunctivae normal.      Pupils: Pupils are equal, round, and reactive to light. Neck:      Vascular: No JVD. Trachea: No tracheal deviation. Cardiovascular:      Rate and Rhythm: Normal rate and regular rhythm. Pulmonary:      Effort: Pulmonary effort is normal. No respiratory distress. Chest:      Chest wall: No tenderness. Abdominal:      General: There is no distension. Palpations: Abdomen is soft. There is no mass. Tenderness: There is no abdominal tenderness. There is no guarding or rebound. Musculoskeletal:         General: Normal range of motion. Cervical back: Normal range of motion and neck supple. Lymphadenopathy:      Cervical: No cervical adenopathy. Skin:     General: Skin is warm and dry. Findings: No erythema or rash.    Neurological:      Mental Status: She is        CT ABDOMEN PELVIS WO CONTRAST Additional Contrast? None  Status: Final result   Order Providers     Authorizing Billing   MD Whitney Caldera MD           Signed by     Signed Date/Time Phone Pager   Sunny Ngo 3/19/2021  9:13 [phone]     Reading Providers     Read Date Phone Pager   Sunny Ngo Fri Mar 19, 2021  9:13 [phone]     Radiation Dose Estimates     No radiation information found for this patient   Narrative   EXAMINATION:   CT OF THE ABDOMEN AND PELVIS WITHOUT CONTRAST 3/19/2021 8:45 pm       TECHNIQUE:   CT of the abdomen and pelvis was performed without the administration of   intravenous contrast. Multiplanar reformatted images are provided for review. Dose modulation, iterative reconstruction, and/or weight based adjustment of   the mA/kV was utilized to reduce the radiation dose to as low as reasonably   achievable.       COMPARISON:   None.       HISTORY:   ORDERING SYSTEM PROVIDED HISTORY: Pain, kidney stones, history of lumbar disc   disease   TECHNOLOGIST PROVIDED HISTORY:   Pain, kidney stones, history of lumbar disc disease       Decision Support Exception->Emergency Medical Condition (MA)       FINDINGS:   Lower Chest: Lung bases are clear       Organs: There is a left renal lesion measuring at least 7.1 x 4.1 cm size. This measures 23 HU and is indeterminate.  Bilateral renal calculi which are   nonobstructive.  No definite hydronephrosis.       Otherwise the noncontrast appearance of the liver, spleen, adrenal glands,   pancreas unremarkable.  Gallbladder is absent.       GI/Bowel: No bowel obstruction.  Mild retained stool.  Colonic diverticulosis   noted.  Appendix absent.       Pelvis: Beam hardening artifact arising from bilateral hip arthroplasties   challenge evaluation of pelvic contents.  Bladder is grossly unremarkable as   visualized.  Uterus is absent.  No suspicious adnexal mass.       Peritoneum/Retroperitoneum: No free air or free fluid.  Aortic vascular   calcifications.       Bones/Soft Tissues: There is a small fat containing ventral abdominal hernia. Of hip arthroplasties.  Evidence of multilevel posterior fusion decompressive   laminectomy involving the lumbar spine L3 through L5.  Prior fusion at L5-S1.           Impression   No acute inflammatory process or bowel obstruction.       Bilateral nephrolithiasis.  No hydronephrosis or obstructive uropathy.       Indeterminate left renal lesion up to 7 cm in size.  Consider dedicated renal   protocol CT further characterization on outpatient basis if this may change   patient management.            ASSESSMENT       Diagnosis Orders   1. Epigastric pain      2. History of Crespo's esophagus      3. History of Nissen fundoplication      4. Sigmoid diverticulosis      5. Loose stools      6. Incontinence of feces, unspecified fecal incontinence type      7. BMI 28.0-28.9,adult      8. S/P laparoscopic cholecystectomy      9. Tobacco abuse            PLAN     Previously discussed at length with Ms. Bales her long history of abdominal pain, bloating, loose stools with occasional incontinence, history of Nissen fundoplication, Crespo's esophagus, diverticulosis, etc.  Prescription for Bentyl provided. We will proceed with EGD and colonoscopy. Risks, benefits, alternatives thoroughly reviewed and accepted by Ms. Bales, including GI bleeding, perforation, missed lesions, COVID-19 exposure/infection, etc.  Laurel diet for now. Discussed importance of tobacco cessation.      Cathie Rock MD

## 2021-08-31 NOTE — ANESTHESIA PRE PROCEDURE
Department of Anesthesiology  Preprocedure Note       Name:  Katrina Roa   Age:  79 y.o.  :  1950                                          MRN:  [id number]         Date:  2021      Surgeon: Mika Ernandez):  Yola Abebe MD    Procedure: COLORECTAL CANCER SCREENING, NOT HIGH RISK (N/A )  EGD ESOPHAGOGASTRODUODENOSCOPY (N/A )    Medications prior to admission:   Prior to Admission medications    Medication Sig Start Date End Date Taking? Authorizing Provider   alendronate (FOSAMAX) 70 MG tablet Take 1 tablet by mouth every 7 days On 21   MURRAY Lynch CNP   dicyclomine (BENTYL) 10 MG capsule Take 1 capsule by mouth 4 times daily 21   Yola Abebe MD   metoprolol tartrate (LOPRESSOR) 50 MG tablet TAKE 1 TABLET TWICE A DAY 21   MURRAY Lynch CNP   atorvastatin (LIPITOR) 10 MG tablet Take 1 tablet by mouth daily 21   MURRAY Lynch CNP   omeprazole (PRILOSEC) 20 MG delayed release capsule Take 1 capsule by mouth 2 times daily (before meals) 21   MURRAY Lynch CNP   sertraline (ZOLOFT) 50 MG tablet Take 1 tablet by mouth daily 21   MURRAY Lynch CNP   meloxicam [facility]) 15 MG tablet Take 1 tablet by mouth daily 21   MURRAY Lynch CNP   gabapentin (NEURONTIN) 800 MG tablet Take 1 tablet by mouth 2 times daily for 90 days.  6/10/21 9/8/21  Mike White MD   metFORMIN (GLUCOPHAGE) 500 MG tablet Take 1 tablet by mouth 2 times daily (with meals) 21   MURRAY Lynch CNP   vitamin B-12 (CYANOCOBALAMIN) 100 MCG tablet 1,000 mcg  3/29/17   Historical Provider, MD   Calcium Carbonate-Vitamin D (OYSTER SHELL CALCIUM/D) 500-200 MG-UNIT TABS Take 1 tablet by mouth 2 times daily (with meals)    Historical Provider, MD   aspirin 81 MG EC tablet Take 81 mg by mouth daily    Historical Provider, MD   albuterol sulfate HFA (VENTOLIN HFA) 108 (90 Base) MCG/ACT inhaler Inhale 2 puffs into the lungs 4 disc    BACK SURGERY  2019    protruded disc    CHOLECYSTECTOMY      1988    COLONOSCOPY  05/2017    CYST REMOVAL  1965    pilonidal    ENDOSCOPY, COLON, DIAGNOSTIC  07/2019    GASTRIC FUNDOPLICATION  9284    HYSTERECTOMY  1998    still has ovaries    JOINT REPLACEMENT Left 2015    HIP    JOINT REPLACEMENT Right     HIP    KNEE ARTHROSCOPY Bilateral 2000    LUMBAR FUSION N/A 01/18/2019    HARDWARE REMOVAL L4-S1, L3-4 DECOMPRESSION AND FUSION EXTENSION TO L3 performed by Samy Barone MD at [address] [place] [address] Street Right 06/09/2016    TOTAL KNEE ARTHROPLASTY Right 2002    TOTAL KNEE ARTHROPLASTY Left 2010    UPPER GASTROINTESTINAL ENDOSCOPY  05/2017    VASCULAR SURGERY Bilateral 2006    LEG       Social History:    Social History     Tobacco Use    Smoking status: Current Every Day Smoker     Packs/day: 1.00     Years: 50.00     Pack years: 50.00     Types: Cigarettes    Smokeless tobacco: Never Used   Substance Use Topics    Alcohol use: Yes     Comment: RARELY                                Ready to quit: Not Answered  Counseling given: Not Answered      Vital Signs (Current): There were no vitals filed for this visit.                                            BP Readings from Last 3 Encounters:   08/31/21 (!) 158/77   08/04/21 120/65   07/20/21 110/68       NPO Status:                                                                                 BMI:   Wt Readings from Last 3 Encounters:   08/31/21 188 lb (85.3 kg)   08/09/21 196 lb (88.9 kg)   08/04/21 190 lb (86.2 kg)     There is no height or weight on file to calculate BMI.    CBC:   Lab Results   Component Value Date    WBC 7.6 03/25/2021    RBC 5.21 03/25/2021    HGB 14.3 03/25/2021    HCT 47.0 03/25/2021    MCV 90.2 03/25/2021    RDW 13.4 03/25/2021     03/25/2021       CMP:   Lab Results   Component Value Date     08/04/2021    K 4.2 08/04/2021     08/04/2021    CO2 27 08/04/2021    BUN 19 08/04/2021 CREATININE 0.62 08/04/2021    GFRAA >60 08/04/2021    LABGLOM >60 08/04/2021    LABGLOM >90 01/22/2019    GLUCOSE 116 08/04/2021    CALCIUM 9.4 08/04/2021    AST 14 03/25/2021    ALT 13 03/25/2021       POC Tests: No results for input(s): POCGLU, POCNA, POCK, POCCL, POCBUN, POCHEMO, POCHCT in the last 72 hours. Coags:   Lab Results   Component Value Date    INR 0.93 01/03/2019    APTT 28.9 01/03/2019       HCG (If Applicable): No results found for: PREGTESTUR, PREGSERUM, HCG, HCGQUANT     ABGs: No results found for: PHART, PO2ART, EVX7RDW, RUP6ZSZ, BEART, T8MMCSIJ     Type & Screen (If Applicable):  Lab Results   Component Value Date    [address] 01/18/2019       Anesthesia Evaluation  Patient summary reviewed and Nursing notes reviewed no history of anesthetic complications:   Airway: Mallampati: II  TM distance: >3 FB   Neck ROM: full  Mouth opening: > = 3 FB Dental:    (+) upper dentures      Pulmonary: breath sounds clear to auscultation  (+) current smoker          Patient smoked on day of surgery. ROS comment: 1/2-1ppd x 45 years. Cardiovascular:  Exercise tolerance: good (>4 METS),   (+) hypertension:,       ECG reviewed  Rhythm: regular  Rate: normal                    Neuro/Psych:   (+) depression/anxiety              ROS comment: Neuropathy below knees bilaterally. GI/Hepatic/Renal:   (+) GERD: well controlled, bowel prep,          ROS comment: Crespo's Esophagitis. Endo/Other:    (+) DiabetesType II DM, well controlled, , : arthritis: OA., .                 Abdominal:             Vascular: negative vascular ROS. ROS comment: Varicose vein surgery, fundoplication. . Other Findings:             Anesthesia Plan      general     ASA 3       Induction: intravenous. MIPS: Prophylactic antiemetics administered. Anesthetic plan and risks discussed with patient. Use of blood products discussed with patient whom consented to blood products.    Plan discussed with CRNA.                  Elizabeth Campuzano, APRN - CRNA   8/31/2021

## 2021-09-03 LAB — SURGICAL PATHOLOGY REPORT: NORMAL

## 2021-10-18 RX ORDER — MELOXICAM 15 MG/1
15 TABLET ORAL DAILY
Qty: 90 TABLET | Refills: 1 | Status: SHIPPED | OUTPATIENT
Start: 2021-10-18 | End: 2022-04-19

## 2021-10-18 RX ORDER — MELOXICAM 15 MG/1
15 TABLET ORAL DAILY
Qty: 30 TABLET | Refills: 0 | Status: SHIPPED | OUTPATIENT
Start: 2021-10-18 | End: 2022-04-19 | Stop reason: ALTCHOICE

## 2021-10-18 RX ORDER — GABAPENTIN 800 MG/1
800 TABLET ORAL 2 TIMES DAILY
Qty: 180 TABLET | Refills: 1 | Status: SHIPPED | OUTPATIENT
Start: 2021-10-18 | End: 2022-04-25 | Stop reason: SDUPTHER

## 2021-10-18 NOTE — TELEPHONE ENCOUNTER
Health Maintenance   Topic Date Due    Diabetic retinal exam  Never done    Annual Wellness Visit (AWV)  Never done    Flu vaccine (1) 09/01/2021    COVID-19 Vaccine (3 - Pfizer booster) 09/22/2021    DTaP/Tdap/Td vaccine (1 - Tdap) 03/23/2022 (Originally 9[birth year]7/1969)    Shingles Vaccine (1 of 2) 03/23/2022 (Originally 9/7/2000)    Hepatitis C screen  03/23/2022 (Originally 1950)    A1C test (Diabetic or Prediabetic)  03/25/2022    Diabetic microalbuminuria test  03/25/2022    Lipid screen  03/25/2022    Low dose CT lung screening  04/09/2022    Diabetic foot exam  06/23/2022    Potassium monitoring  08/04/2022    Creatinine monitoring  08/04/2022    Breast cancer screen  05/03/2023    Colon cancer screen colonoscopy  08/31/2031    DEXA (modify frequency per FRAX score)  Completed    Pneumococcal 65+ years Vaccine  Completed    Hepatitis A vaccine  Aged Out    Hib vaccine  Aged Out    Meningococcal (ACWY) vaccine  Aged Out             (applicable per patient's age: Cancer Screenings, Depression Screening, Fall Risk Screening, Immunizations)    Hemoglobin A1C (%)   Date Value   03/25/2021 6.8 (H)   01/20/2019 6.8 (H)     Microalb/Crt.  Ratio (mcg/mg creat)   Date Value   03/25/2021 11     LDL Cholesterol (mg/dL)   Date Value   03/25/2021 49     AST (U/L)   Date Value   03/25/2021 14     ALT (U/L)   Date Value   03/25/2021 13     BUN (mg/dL)   Date Value   08/04/2021 19      (goal A1C is < 7)   (goal LDL is <100) need 30-50% reduction from baseline     BP Readings from Last 3 Encounters:   08/31/21 (!) 106/52   08/31/21 139/82   08/04/21 120/65    (goal /80)      All Future Testing planned in [facility]:  Lab Frequency Next Occurrence   ALT Once 01/20/2022   AST Once 10/58/4509   Basic Metabolic Panel Once 71/04/0745   CBC Once 01/20/2022   Hemoglobin A1C Once 01/20/2022   Vitamin D 25 Hydroxy Once 01/20/2022   Initiate PAT Protocol Once 09/14/2021       Next Visit Date:  Future Appointments Date Time Provider Jn Whitfield   12/6/2021  3:00 PM Claudetta Peek, MD [facility]   1/19/2022 10:00 AM MURRAY Dumont CNP [facility]            Patient Active Problem List:     Primary osteoarthritis of right hip     Essential (primary) hypertension     Tobacco abuse     Spinal stenosis of lumbar region at multiple levels     Type 2 diabetes mellitus, without long-term current use of insulin ([facility] [address]     Long term prescription opiate use     Osteoarthritis     Fracture of tooth     Herniated lumbar intervertebral disc     History of total hip replacement     Low back pain     Neuropathy     Noninfectious otitis externa of left ear     Obesity     Osteoarthritis of thoracic spine     Spondylosis of lumbar spine     Osteopenia     Anxiety     Depression     Epigastric pain

## 2021-11-03 RX ORDER — ATORVASTATIN CALCIUM 10 MG/1
10 TABLET, FILM COATED ORAL DAILY
Qty: 90 TABLET | Refills: 3 | Status: SHIPPED | OUTPATIENT
Start: 2021-11-03 | End: 2022-06-07 | Stop reason: SDUPTHER

## 2022-01-14 ENCOUNTER — TELEPHONE (OUTPATIENT)
Dept: FAMILY MEDICINE CLINIC | Age: 72
End: 2022-01-14

## 2022-01-14 NOTE — TELEPHONE ENCOUNTER
Pt. Called stating that she went to [facility] earlier this week and rapid covid test is negative and PCR was positive. She is questioning if she should get the monoclonal antibody infusion that is scheduled for today and she says she is feeling better. Ntfd. Pt. That Chip Better recommends that if she is having any fever, fatigue, weakness or SOB to get the infusion.

## 2022-01-19 ENCOUNTER — OFFICE VISIT (OUTPATIENT)
Dept: FAMILY MEDICINE CLINIC | Age: 72
End: 2022-01-19
Payer: COMMERCIAL

## 2022-01-19 ENCOUNTER — HOSPITAL ENCOUNTER (OUTPATIENT)
Age: 72
Discharge: HOME | End: 2022-01-19
Payer: COMMERCIAL

## 2022-01-19 VITALS
DIASTOLIC BLOOD PRESSURE: 70 MMHG | BODY MASS INDEX: 24.77 KG/M2 | OXYGEN SATURATION: 99 % | SYSTOLIC BLOOD PRESSURE: 124 MMHG | RESPIRATION RATE: 14 BRPM | WEIGHT: 173 LBS | HEART RATE: 73 BPM | HEIGHT: 70 IN

## 2022-01-19 DIAGNOSIS — J12.82 PNEUMONIA DUE TO COVID-19 VIRUS: ICD-10-CM

## 2022-01-19 DIAGNOSIS — Z00.00 MEDICARE ANNUAL WELLNESS VISIT, SUBSEQUENT: Primary | ICD-10-CM

## 2022-01-19 DIAGNOSIS — E11.9 TYPE 2 DIABETES MELLITUS WITHOUT COMPLICATION, WITHOUT LONG-TERM CURRENT USE OF INSULIN (HCC): ICD-10-CM

## 2022-01-19 DIAGNOSIS — U07.1 PNEUMONIA DUE TO COVID-19 VIRUS: ICD-10-CM

## 2022-01-19 DIAGNOSIS — I10 ESSENTIAL HYPERTENSION: ICD-10-CM

## 2022-01-19 DIAGNOSIS — E83.42 HYPOMAGNESEMIA: ICD-10-CM

## 2022-01-19 DIAGNOSIS — R10.84 GENERALIZED ABDOMINAL PAIN: ICD-10-CM

## 2022-01-19 DIAGNOSIS — M81.0 OSTEOPOROSIS, UNSPECIFIED OSTEOPOROSIS TYPE, UNSPECIFIED PATHOLOGICAL FRACTURE PRESENCE: ICD-10-CM

## 2022-01-19 LAB
ABSOLUTE EOS #: 0.14 K/UL (ref 0–0.44)
ABSOLUTE IMMATURE GRANULOCYTE: <0.03 K/UL (ref 0–0.3)
ABSOLUTE LYMPH #: 1.57 K/UL (ref 1.1–3.7)
ABSOLUTE MONO #: 0.34 K/UL (ref 0.1–1.2)
ALBUMIN SERPL-MCNC: 4.3 G/DL (ref 3.5–5.2)
ALBUMIN/GLOBULIN RATIO: 1.2 (ref 1–2.5)
ALP BLD-CCNC: 87 U/L (ref 35–104)
ALT SERPL-CCNC: 11 U/L (ref 5–33)
ANION GAP SERPL CALCULATED.3IONS-SCNC: 11 MMOL/L (ref 9–17)
AST SERPL-CCNC: 12 U/L
BASOPHILS # BLD: 1 % (ref 0–2)
BASOPHILS ABSOLUTE: 0.03 K/UL (ref 0–0.2)
BILIRUB SERPL-MCNC: 0.6 MG/DL (ref 0.3–1.2)
BUN BLDV-MCNC: 29 MG/DL (ref 8–23)
BUN/CREAT BLD: 43 (ref 9–20)
CALCIUM SERPL-MCNC: 10 MG/DL (ref 8.6–10.4)
CHLORIDE BLD-SCNC: 103 MMOL/L (ref 98–107)
CO2: 27 MMOL/L (ref 20–31)
CREAT SERPL-MCNC: 0.68 MG/DL (ref 0.5–0.9)
DIFFERENTIAL TYPE: NORMAL
EOSINOPHILS RELATIVE PERCENT: 3 % (ref 1–4)
GFR AFRICAN AMERICAN: >60 ML/MIN
GFR NON-AFRICAN AMERICAN: >60 ML/MIN
GFR SERPL CREATININE-BSD FRML MDRD: ABNORMAL ML/MIN/{1.73_M2}
GFR SERPL CREATININE-BSD FRML MDRD: ABNORMAL ML/MIN/{1.73_M2}
GLUCOSE BLD-MCNC: 99 MG/DL (ref 70–99)
HCT VFR BLD CALC: 45.2 % (ref 36.3–47.1)
HEMOGLOBIN: 14 G/DL (ref 11.9–15.1)
IMMATURE GRANULOCYTES: 0 %
LYMPHOCYTES # BLD: 32 % (ref 24–43)
MAGNESIUM: 1.7 MG/DL (ref 1.6–2.6)
MCH RBC QN AUTO: 27.7 PG (ref 25.2–33.5)
MCHC RBC AUTO-ENTMCNC: 31 G/DL (ref 28.4–34.8)
MCV RBC AUTO: 89.5 FL (ref 82.6–102.9)
MONOCYTES # BLD: 7 % (ref 3–12)
NRBC AUTOMATED: 0 PER 100 WBC
PDW BLD-RTO: 13.7 % (ref 11.8–14.4)
PLATELET # BLD: 223 K/UL (ref 138–453)
PLATELET ESTIMATE: NORMAL
PMV BLD AUTO: 11 FL (ref 8.1–13.5)
POTASSIUM SERPL-SCNC: 4.3 MMOL/L (ref 3.7–5.3)
RBC # BLD: 5.05 M/UL (ref 3.95–5.11)
RBC # BLD: NORMAL 10*6/UL
SEG NEUTROPHILS: 57 % (ref 36–65)
SEGMENTED NEUTROPHILS ABSOLUTE COUNT: 2.84 K/UL (ref 1.5–8.1)
SODIUM BLD-SCNC: 141 MMOL/L (ref 135–144)
TOTAL PROTEIN: 7.9 G/DL (ref 6.4–8.3)
WBC # BLD: 4.9 K/UL (ref 3.5–11.3)
WBC # BLD: NORMAL 10*3/UL

## 2022-01-19 PROCEDURE — G0009 ADMIN PNEUMOCOCCAL VACCINE: HCPCS | Performed by: NURSE PRACTITIONER

## 2022-01-19 PROCEDURE — G0439 PPPS, SUBSEQ VISIT: HCPCS | Performed by: NURSE PRACTITIONER

## 2022-01-19 PROCEDURE — 85025 COMPLETE CBC W/AUTO DIFF WBC: CPT

## 2022-01-19 PROCEDURE — 83036 HEMOGLOBIN GLYCOSYLATED A1C: CPT

## 2022-01-19 PROCEDURE — 36415 COLL VENOUS BLD VENIPUNCTURE: CPT

## 2022-01-19 PROCEDURE — 80053 COMPREHEN METABOLIC PANEL: CPT

## 2022-01-19 PROCEDURE — 83735 ASSAY OF MAGNESIUM: CPT

## 2022-01-19 PROCEDURE — 90670 PCV13 VACCINE IM: CPT | Performed by: NURSE PRACTITIONER

## 2022-01-19 PROCEDURE — 99214 OFFICE O/P EST MOD 30 MIN: CPT | Performed by: NURSE PRACTITIONER

## 2022-01-19 PROCEDURE — 82306 VITAMIN D 25 HYDROXY: CPT

## 2022-01-19 RX ORDER — CIPROFLOXACIN 500 MG/1
500 TABLET, FILM COATED ORAL 2 TIMES DAILY
Qty: 14 TABLET | Refills: 0 | Status: SHIPPED | OUTPATIENT
Start: 2022-01-19 | End: 2022-01-26

## 2022-01-19 RX ORDER — METRONIDAZOLE 500 MG/1
500 TABLET ORAL 3 TIMES DAILY
Qty: 21 TABLET | Refills: 0 | Status: SHIPPED | OUTPATIENT
Start: 2022-01-19 | End: 2022-01-26

## 2022-01-19 RX ORDER — DOXYCYCLINE HYCLATE 100 MG/1
100 CAPSULE ORAL 2 TIMES DAILY
COMMUNITY
Start: 2022-01-12 | End: 2022-01-22

## 2022-01-19 ASSESSMENT — PATIENT HEALTH QUESTIONNAIRE - PHQ9
1. LITTLE INTEREST OR PLEASURE IN DOING THINGS: 2
4. FEELING TIRED OR HAVING LITTLE ENERGY: 2
2. FEELING DOWN, DEPRESSED OR HOPELESS: 1
SUM OF ALL RESPONSES TO PHQ QUESTIONS 1-9: 5
3. TROUBLE FALLING OR STAYING ASLEEP: 0
8. MOVING OR SPEAKING SO SLOWLY THAT OTHER PEOPLE COULD HAVE NOTICED. OR THE OPPOSITE, BEING SO FIGETY OR RESTLESS THAT YOU HAVE BEEN MOVING AROUND A LOT MORE THAN USUAL: 0
6. FEELING BAD ABOUT YOURSELF - OR THAT YOU ARE A FAILURE OR HAVE LET YOURSELF OR YOUR FAMILY DOWN: 0
SUM OF ALL RESPONSES TO PHQ9 QUESTIONS 1 & 2: 3
5. POOR APPETITE OR OVEREATING: 0
9. THOUGHTS THAT YOU WOULD BE BETTER OFF DEAD, OR OF HURTING YOURSELF: 0
SUM OF ALL RESPONSES TO PHQ QUESTIONS 1-9: 5
10. IF YOU CHECKED OFF ANY PROBLEMS, HOW DIFFICULT HAVE THESE PROBLEMS MADE IT FOR YOU TO DO YOUR WORK, TAKE CARE OF THINGS AT HOME, OR GET ALONG WITH OTHER PEOPLE: 0
SUM OF ALL RESPONSES TO PHQ QUESTIONS 1-9: 5
SUM OF ALL RESPONSES TO PHQ QUESTIONS 1-9: 5
7. TROUBLE CONCENTRATING ON THINGS, SUCH AS READING THE NEWSPAPER OR WATCHING TELEVISION: 0

## 2022-01-19 ASSESSMENT — ENCOUNTER SYMPTOMS
ABDOMINAL PAIN: 1
SHORTNESS OF BREATH: 1
SINUS PRESSURE: 0
RHINORRHEA: 0
DIARRHEA: 1
SINUS PAIN: 0
COUGH: 0
SORE THROAT: 0
BACK PAIN: 1
WHEEZING: 0

## 2022-01-19 NOTE — PROGRESS NOTES
Medicare Annual Wellness Visit  Name: Adriel Pandya Date: 2022   MRN: [id number] Sex: Female   Age: 70 y.o. Ethnicity: Non- / Non    : 1950 Race: White (non-)      Alycia Green is here for Medicare AWV (mcr), Other (patient tested POS for COVID 22. states she is improving other then lower left quad pain), and Abdominal Pain (patient has lower left quad pain. states is started around 3 weeks ago. currently on doxycycline. )    Screenings for behavioral, psychosocial and functional/safety risks, and cognitive dysfunction are all negative except as indicated below. These results, as well as other patient data from the [address] form, are documented in Flowsheets linked to this Encounter. HPI:    Wellness: The patient presents for AWV. She denies routine eye exams. She denies routine dental exams. She is vaccinated for covid. She has not received annual influenza vaccine. She has not received pneumococcal vaccines. She has not received shingles vaccines. Most recent tetanus unknown. Most recent mammogram 2021 WNL. Colorectal cancer screening 2021 with a recommended 10 year repeat. DM:  Diabetic diet/low carb diet compliance: compliant  Current exercise: none  Frequency of glucose testing at home: fasting average in the morning 101  History of hypoglycemic episodes: no  Last eye exam: \"it may have been last year\"  Last diabetic foot check: 2021   reports that she has been smoking cigarettes. She has a 50.00 pack-year smoking history. She has never used smokeless tobacco.   Medication compliance:  compliant all of the time  Medication Therapy: metformin 500mg BID  Hemoglobin A1C (%)   Date Value   2021 6.8 (H)   2019 6.8 (H)      Hypertension:  Current treatment includes metoprolol tartrate 50mg QD. She does not monitor blood pressure at home. ED follow up:    The patient was seen at [facility] emergency department 2022 with concerns of nonproductive cough, chills, nausea, general weakness, and fatigue that began 2 weeks prior to her ED visit. Per ED note per EMR her magnesium and potassium were low and she was given supplements. Her chest x-ray did show pneumonia. While in the emergency department her second COVID test was positive, per patient. She was offered monoclonal antibodies and completed the infusion 1/14/22 [facility].  She was also prescribed doxycyline by the ED. Allergies   Allergen Reactions    Penicillins Hives         Prior to Visit Medications    Medication Sig Taking? Authorizing Provider   doxycycline hyclate (VIBRAMYCIN) 100 MG capsule Take 100 mg by mouth 2 times daily Yes Historical Provider, MD   ciprofloxacin (CIPRO) 500 MG tablet Take 1 tablet by mouth 2 times daily for 7 days Yes MURRAY Motta CNP   metroNIDAZOLE (FLAGYL) 500 MG tablet Take 1 tablet by mouth 3 times daily for 7 days Yes MURRAY Motta CNP   atorvastatin (LIPITOR) 10 MG tablet Take 1 tablet by mouth daily Yes MURRAY Motta CNP   meloxicam (MOBIC) 15 MG tablet Take 1 tablet by mouth daily Yes MURRAY Motta CNP   meloxicam (MOBIC) 15 MG tablet Take 1 tablet by mouth daily Yes MURRAY Motta CNP   sucralfate (CARAFATE) 1 GM tablet Take 1 tablet by mouth 4 times daily To be taken as a slurry. Instruct patient how to create slurry at home.  Yes Aydin Chadwick MD   alendronate (FOSAMAX) 70 MG tablet Take 1 tablet by mouth every 7 days On Thursdays Yes MURRAY Motta CNP   dicyclomine (BENTYL) 10 MG capsule Take 1 capsule by mouth 4 times daily Yes Aydin Chadwick MD   metoprolol tartrate (LOPRESSOR) 50 MG tablet TAKE 1 TABLET TWICE A DAY Yes MURRAY Motta CNP   omeprazole (PRILOSEC) 20 MG delayed release capsule Take 1 capsule by mouth 2 times daily (before meals) Yes MURRAY Motta CNP   sertraline (ZOLOFT) 50 MG tablet Take 1 tablet by mouth daily Yes MURRAY Granados CNP   metFORMIN (GLUCOPHAGE) 500 MG tablet Take 1 tablet by mouth 2 times daily (with meals) Yes MURRAY Granados CNP   vitamin B-12 (CYANOCOBALAMIN) 100 MCG tablet 1,000 mcg  Yes Historical Provider, MD   Calcium Carbonate-Vitamin D (OYSTER SHELL CALCIUM/D) 500-200 MG-UNIT TABS Take 1 tablet by mouth 2 times daily (with meals) Yes Historical Provider, MD   aspirin 81 MG EC tablet Take 81 mg by mouth daily Yes Historical Provider, MD   albuterol sulfate HFA (VENTOLIN HFA) 108 (90 Base) MCG/ACT inhaler Inhale 2 puffs into the lungs 4 times daily as needed for Wheezing Yes MURRAY Granados CNP   gabapentin (NEURONTIN) 800 MG tablet Take 1 tablet by mouth 2 times daily for 90 days.   MURRAY Granados CNP         Past Medical History:   Diagnosis Date    Arthritis     Crespo's esophagus     Diabetes mellitus ([address]     Hypertension        Past Surgical History:   Procedure Laterality Date    APPENDECTOMY  1969    BACK SURGERY  1994    upper disc    BACK SURGERY  2019    protruded disc    CHOLECYSTECTOMY      1988    COLONOSCOPY  05/2017    COLONOSCOPY  08/31/2021    Dr. Carlos Wang - normal colonoscopy    COLONOSCOPY N/A 8/31/2021    COLORECTAL CANCER SCREENING, NOT HIGH RISK performed by Jenni Peabody, MD at [address]    pilonidal    ENDOSCOPY, COLON, DIAGNOSTIC  07/2019    GASTRIC FUNDOPLICATION  [address]    still has ovaries    JOINT REPLACEMENT Left 2015    HIP    JOINT REPLACEMENT Right     HIP    KNEE ARTHROSCOPY Bilateral 2000    LUMBAR FUSION N/A 01/18/2019    HARDWARE REMOVAL L4-S1, L3-4 DECOMPRESSION AND FUSION EXTENSION TO L3 performed by Veronica Gutierrez MD at [address] [facility] Right 06/09/2016    TOTAL KNEE ARTHROPLASTY Right 2002    TOTAL KNEE ARTHROPLASTY Left 2010    UPPER GASTROINTESTINAL ENDOSCOPY  05/2017    UPPER GASTROINTESTINAL ENDOSCOPY  08/31/2021    Dr. Carlos Wang - biopsies  UPPER GASTROINTESTINAL ENDOSCOPY N/A 8/31/2021    EGD BIOPSY performed by Doris Johnson MD at [address] Bilateral 2006    LEG         Family History   Problem Relation Age of Onset    Diabetes Mother     Heart Disease Mother     Cancer Father     Diabetes Brother     Cancer Brother     High Blood Pressure Sister      ROS:  Review of Systems   Constitutional: Positive for unexpected weight change (admits losing weight due to recent illness). Negative for chills, fatigue and fever. HENT: Positive for congestion. Negative for rhinorrhea, sinus pressure, sinus pain and sore throat. Respiratory: Positive for shortness of breath (improving from recent covid diagnosis). Negative for cough and wheezing. Cardiovascular: Negative for chest pain and palpitations. Gastrointestinal: Positive for abdominal pain and diarrhea. Admits mid-abdominal pain that is intermittent. This causes her to be nauseous. Symptoms lasts minutes then resolve. Admits diarrhea 2-3 times daily. Denies blood in the stool. Denies black colored stools. Denies vomiting. She admits occasional stool incontinence that has been ongoing for several years. Denies dietary triggers. Gallbladder and appendix surgically absent. Genitourinary: Negative for difficulty urinating. Musculoskeletal: Positive for back pain (chronic). Negative for arthralgias, joint swelling and myalgias. Neurological: Positive for light-headedness (improving from recent covid diagnosis). Negative for dizziness and headaches.      CareTeam (Including outside providers/suppliers regularly involved in providing care):   Patient Care Team:  MURRAY Ordonez CNP as PCP - General (Family Nurse Practitioner)  MURRAY Ordonez CNP as PCP - [facility] Empaneled Provider  María Estevez MD as Consulting Physician (Family Medicine)  Devika Millard RN as Imaging Navigator    Wt Readings from Last 3 Encounters:   01/19/22 173 lb (78.5 kg) 08/31/21 188 lb (85.3 kg)   08/09/21 196 lb (88.9 kg)     Vitals:    01/19/22 1006   BP: 124/70   Pulse: 73   Resp: 14   SpO2: 99%   Weight: 173 lb (78.5 kg)   Height: 5' 9.5\" (1.765 m)     Body mass index is 25.18 kg/m². Based upon direct observation of the patient, evaluation of cognition reveals recent and remote memory intact. Physical Exam  Constitutional:       General: She is not in acute distress. Appearance: Normal appearance. She is normal weight. She is not ill-appearing or toxic-appearing. HENT:      Head: Normocephalic. Right Ear: Tympanic membrane, ear canal and external ear normal. There is no impacted cerumen. Left Ear: Tympanic membrane, ear canal and external ear normal. There is no impacted cerumen. Nose: Nose normal. No congestion or rhinorrhea. Mouth/Throat:      Mouth: Mucous membranes are moist.      Pharynx: No oropharyngeal exudate or posterior oropharyngeal erythema. Cardiovascular:      Rate and Rhythm: Normal rate and regular rhythm. Heart sounds: Normal heart sounds. No murmur heard. Pulmonary:      Effort: No respiratory distress. Breath sounds: No stridor. Wheezing (left upper lobe) and rhonchi (left lower lobe) present. No rales. Abdominal:      General: Bowel sounds are normal. There is no distension. Palpations: Abdomen is soft. There is no mass. Tenderness: There is abdominal tenderness (generalized, most severe near umbillicus). There is no guarding. Musculoskeletal:      Cervical back: Neck supple. Lymphadenopathy:      Cervical: No cervical adenopathy. Skin:     General: Skin is warm. Neurological:      Mental Status: She is alert and oriented to person, place, and time. Psychiatric:         Mood and Affect: Mood normal.         Behavior: Behavior normal.         Thought Content:  Thought content normal.         Judgment: Judgment normal.         Patient's complete Health Risk Assessment and screening values have been reviewed and are found in [facility]. The following problems were reviewed today and where indicated follow up appointments were made and/or referrals ordered. Positive Risk Factor Screenings with Interventions:     Fall Risk:  2 or more falls in past year?: (!) yes  Fall with injury in past year?: no     Depression:  PHQ-2 Score: 3  PHQ-9 Total Score: 5    Severity:1-4 = minimal depression, 5-9 = mild depression, 10-14 = moderate depression, 15-19 = moderately severe depression, 20-27 = severe depression     Substance History:  Social History     Tobacco History     Smoking Status  Current Every Day Smoker Smoking Frequency  1 pack/day for 50 years (50 pk yrs) Smoking Tobacco Type  Cigarettes    Smokeless Tobacco Use  Never Used          Alcohol History     Alcohol Use Status  Yes Comment  RARELY          Drug Use     Drug Use Status  No          Sexual Activity     Sexually Active  Not Asked               Alcohol Screening:       A score of 8 or more is associated with harmful or hazardous drinking. A score of 13 or more in women, and 15 or more in men, is likely to indicate alcohol dependence.   Substance Abuse Interventions:  · Not discussed        General Health and ACP:     Advance Directives     Power of  Living Will ACP-Advance Directive ACP-Power of     Not on File Not on File Not on File Not on File      General Health Risk Interventions:  · No Living Will: Advance Care Planning addressed with patient today          Personalized Preventive Plan   Current Health Maintenance Status  Immunization History   Administered Date(s) Administered    COVID-19, Pfizer Purple top, DILUTE for use, 12+ yrs, 30mcg/0.3mL dose 03/01/2021, 03/22/2021    Influenza Virus Vaccine 11/29/2018, 01/18/2019    Influenza, High Dose (Fluzone 65 yrs and older) 01/23/2018, 01/18/2019    Influenza, Quadv, IM, PF (6 mo and older Fluzone, Flulaval, Fluarix, and 3 yrs and older Afluria) 11/29/2018    Influenza, Quadv, Recombinant, IM PF (Flublok 18 yrs and older) 10/09/2019    Pneumococcal Conjugate 13-valent ([id number]) 01/23/2018    Pneumococcal Polysaccharide ([id number]) 10/09/2019        Health Maintenance   Topic Date Due    Diabetic retinal exam  Never done    Annual Wellness Visit (AWV)  Never done    DTaP/Tdap/Td vaccine (1 - Tdap) 03/23/2022 (Originally 9/7/1969)    Shingles Vaccine (1 of 2) 03/23/2022 (Originally 9/7/2000)    Hepatitis C screen  03/23/2022 (Originally 1950)    COVID-19 Vaccine (3 - Booster for Pfizer series) 01/01/2023 (Originally 9/22/2021)    Flu vaccine (1) 01/19/2023 (Originally 9/1/2021)    A1C test (Diabetic or Prediabetic)  03/25/2022    Diabetic microalbuminuria test  03/25/2022    Lipid screen  03/25/2022    Low dose CT lung screening  04/09/2022    Diabetic foot exam  06/23/2022    Depression Monitoring  06/23/2022    Potassium monitoring  08/04/2022    Creatinine monitoring  08/04/2022    Breast cancer screen  05/03/2023    Colon cancer screen colonoscopy  08/31/2031    DEXA (modify frequency per FRAX score)  Completed    Pneumococcal 65+ years Vaccine  Completed    Hepatitis A vaccine  Aged Out    Hib vaccine  Aged Out    Meningococcal (ACWY) vaccine  Aged Out     PLAN:    Covid:   - I recommend oxygen saturation monitoring with a pulse ox. This can be purchased at [employer]. If your SPO2 (oxygen saturation) reads less than 95% notify office/provider on-call. If this reads less than 90% go to the emergency department.  - Notify office if symptoms worsen or persist.  - Continue full course of doxycycline  - If symptoms become severe such as SOB or chest pain she is to present to the emergency department.  - Supplement with vitamin C, vitamin D, and zinc  - Complete chest x-ray in 4 weeks to trend    Abdominal pain:  - Will treat for suspected diverticulitis with cipro and flagyl x7 days.   CT abdomen pelvis 3/2021 showed sigmoid diverticulosis. - The patient has had 2 CTs of the abdomen pelvis within the last year. I will not order a repeat at this time but will rather treat on symptoms and history in effort to decrease radiation exposure. She will follow-up in 1 week and if the symptoms persist/continue we will then consider CT of abdomen pelvis. - Notify office if symptoms worsen or persist  - If symptoms become severe go to the emergency department. Reviewed risks associated with worsening diverticulosis  - Follow-up in 1 week for abdominal pain     Diabetes:  - No changes in medication at this time  - Complete diabetic eye exam    Wellness:  - Counseled that she is due for tetanus, shingles, and influenza vaccine. These can be completed at local health department or local pharmacy. - PPSV13 given in office today  - Counseled on well-balanced diet and routine physical activity  - Schedule routine eye and dental exams  - Mammogram UTD 5/2021, benign  - Colonoscopy completed 8/2021  - Advanced care planning packet supplied to patient today. Encouraged to repeat and return to office. Hx hypomagnesium and hypokalemia:  - Repeat lab work today to trend    Recommendations for [employer] Due: see orders and patient instructions/AVS.  . Recommended screening schedule for the next 5-10 years is provided to the patient in written form: see Patient Lucero Lindsay was seen today for medicare awv, other and abdominal pain. Diagnoses and all orders for this visit:    Medicare annual wellness visit, subsequent    Pneumonia due to COVID-19 virus  -     XR CHEST STANDARD (2 VW); Future    Type 2 diabetes mellitus without complication, without long-term current use of insulin (HCC)  -     Hemoglobin A1C; Future    Essential hypertension  -     Comprehensive Metabolic Panel; Future  -     CBC With Auto Differential; Future    Hypomagnesemia  -     Magnesium;  Future    Generalized abdominal pain    Other orders  - ciprofloxacin (CIPRO) 500 MG tablet; Take 1 tablet by mouth 2 times daily for 7 days  -     metroNIDAZOLE (FLAGYL) 500 MG tablet;  Take 1 tablet by mouth 3 times daily for 7 days

## 2022-01-19 NOTE — PATIENT INSTRUCTIONS
Covid: -I recommend oxygen saturation monitoring with a pulse ox. This can be purchased at [email or web address]. If your SPO2 (oxygen saturation) reads less than 95% notify office/provider on-call. If this reads less than 90% go to the emergency department.  - Notify office if symptoms worsen or persist.  - Continue full course of doxycycline  - If symptoms become severe such as SOB or chest pain she is to present to the emergency department.  - Supplement with vitamin C, vitamin D, and zinc  - Complete chest x-ray in 4 weeks. Abdominal pain:  - Will treat for suspected diverticulitis (inflammatory condition of the bowel). - I will call in antibiotics for you to take IN CONJUNCTION with your doxycycline  -Notify office if symptoms worsen or persist  - If symptoms become severe go to the emergency department  - Follow-up in 1 week for abdominal pain follow-up    Diabetes:  - No changes in medication at this time  - Complete diabetic eye exam    Lab work:   - Complete lab work today/soon    Wellness:  - You are due for tetanus, shingles, and influenza vaccine. These can be completed at local health department or local pharmacy. - Please schedule routine eye and dental exams      SURVEY:    You may be receiving a survey from [employer] regarding your visit today. Please complete the survey to enable us to provide the highest quality of care to you and your family. If you cannot score us a very good on any question, please call the office to discuss how we could of made your experience a very good one. Thank you.

## 2022-01-20 LAB
ESTIMATED AVERAGE GLUCOSE: 140 MG/DL
HBA1C MFR BLD: 6.5 % (ref 4–6)
VITAMIN D 25-HYDROXY: 47.5 NG/ML (ref 30–100)

## 2022-01-24 ENCOUNTER — TELEPHONE (OUTPATIENT)
Dept: FAMILY MEDICINE CLINIC | Age: 72
End: 2022-01-24

## 2022-01-26 ENCOUNTER — OFFICE VISIT (OUTPATIENT)
Dept: FAMILY MEDICINE CLINIC | Age: 72
End: 2022-01-26
Payer: COMMERCIAL

## 2022-01-26 VITALS
TEMPERATURE: 97.3 F | HEART RATE: 74 BPM | WEIGHT: 180 LBS | SYSTOLIC BLOOD PRESSURE: 115 MMHG | DIASTOLIC BLOOD PRESSURE: 84 MMHG | RESPIRATION RATE: 14 BRPM | HEIGHT: 70 IN | OXYGEN SATURATION: 97 % | BODY MASS INDEX: 25.77 KG/M2

## 2022-01-26 DIAGNOSIS — R19.7 DIARRHEA, UNSPECIFIED TYPE: Primary | ICD-10-CM

## 2022-01-26 DIAGNOSIS — R10.84 GENERALIZED ABDOMINAL PAIN: ICD-10-CM

## 2022-01-26 PROCEDURE — 99214 OFFICE O/P EST MOD 30 MIN: CPT | Performed by: NURSE PRACTITIONER

## 2022-01-26 RX ORDER — METRONIDAZOLE 500 MG/1
500 TABLET ORAL 3 TIMES DAILY
Qty: 9 TABLET | Refills: 0 | Status: SHIPPED | OUTPATIENT
Start: 2022-01-26 | End: 2022-01-29

## 2022-01-26 RX ORDER — DICYCLOMINE HYDROCHLORIDE 10 MG/1
10 CAPSULE ORAL 4 TIMES DAILY PRN
Qty: 60 CAPSULE | Refills: 1 | Status: SHIPPED | OUTPATIENT
Start: 2022-01-26 | End: 2022-06-07 | Stop reason: SDUPTHER

## 2022-01-26 RX ORDER — CIPROFLOXACIN 500 MG/1
500 TABLET, FILM COATED ORAL 2 TIMES DAILY
Qty: 6 TABLET | Refills: 0 | Status: SHIPPED | OUTPATIENT
Start: 2022-01-26 | End: 2022-01-29

## 2022-01-26 ASSESSMENT — ENCOUNTER SYMPTOMS
ABDOMINAL PAIN: 1
VOMITING: 0
ABDOMINAL DISTENTION: 0
DIARRHEA: 1

## 2022-01-26 NOTE — PROGRESS NOTES
Name: Ankita Aguilera  : 1950         Chief Complaint:     Chief Complaint   Patient presents with    Follow-up     patient states abdominal pain comes and goes. yesterday and today were ok.  Diarrhea     still having. small movements. History of Present Illness:      Ankita Aguilera is a 70 y.o.  female who presents with Follow-up (patient states abdominal pain comes and goes. yesterday and today were ok. ) and Diarrhea (still having. small movements. )      HPI     The patient presents for abdominal pain f/u. She was seen in office 1 week ago on 22 when the following was noted in the ROS, \"mid-abdominal pain that is intermittent. This causes her to be nauseous. Symptoms lasts minutes then resolve. Admits diarrhea 2-3 times daily. Denies blood in the stool. Denies black colored stools. Denies vomiting. She admits occasional stool incontinence that has been ongoing for several years. Denies dietary triggers. Gallbladder and appendix surgically absent. \"  On 2022 she was treated with Cipro and Flagyl x7 days. CMP 2022 unremarkable with mildly elevated BUN to 29. WBC 2022 4.9. EGD and colonoscopy completed 2021 showed distal esophagitis, diffuse superficial erosive gastritis, and sigmoid diverticulosis. CT abdomen pelvis 3/2021 showed sigmoid diverticulosis. She states that that since starting the antibiotics her symptoms have improved but still remain. 2 days ago her symptoms were so severe she was unable to leave the house. Today, she reports she is doing better. She is continuing to have diarrhea every time she urinates. She is having diarrhea every 3-4 hours in very small amounts. She reports her stools as bristol 6. Denies blood in the stool. Admits single episode of small, scant blood on the toilet paper that she believes was related to skin irritation. She states that her abdominal pain has been ongoing x1 month.  She states that she has \"always\" had diarrhea but this has been more significant in the last month. Abdominal pain is located generalized lower and upper abdominal area. She denies abdominal bloating. Denies urinary symptoms, aside from less frequency d/t less fluid and food intake. Denies fever or chills. Denies vomiting. Past Medical History:     Past Medical History:   Diagnosis Date    Arthritis     Crespo's esophagus     Diabetes mellitus ([facility] [address]     Hypertension       Reviewed all health maintenance requirements and ordered appropriate tests  Health Maintenance Due   Topic Date Due    Diabetic retinal exam  Never done       Past Surgical History:     Past Surgical History:   Procedure Laterality Date    APPENDECTOMY  1969    BACK SURGERY  1994    upper disc    BACK SURGERY  2019    protruded disc    CHOLECYSTECTOMY      1988    COLONOSCOPY  05/2017    COLONOSCOPY  08/31/2021    Dr. Royal Cook - normal colonoscopy    COLONOSCOPY N/A 8/31/2021    COLORECTAL CANCER SCREENING, NOT HIGH RISK performed by Sancho Simons MD at [address]    pilonidal    ENDOSCOPY, COLON, DIAGNOSTIC  07/2019    GASTRIC FUNDOPLICATION  [address]    still has ovaries    JOINT REPLACEMENT Left 2015    HIP    JOINT REPLACEMENT Right     HIP    KNEE ARTHROSCOPY Bilateral 2000    LUMBAR FUSION N/A 01/18/2019    HARDWARE REMOVAL L4-S1, L3-4 DECOMPRESSION AND FUSION EXTENSION TO L3 performed by Elder Huston MD at [address] [facility] L[address] Right 06/09/2016    TOTAL KNEE ARTHROPLASTY Right 2002    TOTAL KNEE ARTHROPLASTY Left 2010    UPPER GASTROINTESTINAL ENDOSCOPY  05/2017    UPPER GASTROINTESTINAL ENDOSCOPY  08/31/2021    Dr. Royal Cook - biopsies    UPPER GASTROINTESTINAL ENDOSCOPY N/A 8/31/2021    EGD BIOPSY performed by Sancho Siomns MD at [address] Bilateral 2006    LEG        Medications:       Prior to Admission medications    Medication Sig Start Date End Date Taking?  Authorizing Provider   ciprofloxacin (CIPRO) 500 MG tablet Take 1 tablet by mouth 2 times daily for 3 days 1/26/22 1/29/22 Yes MURRAY Maier CNP   metroNIDAZOLE (FLAGYL) 500 MG tablet Take 1 tablet by mouth 3 times daily for 3 days 1/26/22 1/29/22 Yes MURRAY Maier CNP   dicyclomine (BENTYL) 10 MG capsule Take 1 capsule by mouth 4 times daily as needed (abdominal cramping) 1/26/22  Yes MURRAY Maier CNP   metroNIDAZOLE (FLAGYL) 500 MG tablet Take 1 tablet by mouth 3 times daily for 7 days 1/19/22 1/26/22 Yes MURRAY Maier CNP   atorvastatin (LIPITOR) 10 MG tablet Take 1 tablet by mouth daily 11/3/21  Yes MURRAY Maier CNP   meloxicam [facility]) 15 MG tablet Take 1 tablet by mouth daily 10/18/21  Yes MURRAY Maier CNP   meloxicam [facility]) 15 MG tablet Take 1 tablet by mouth daily 10/18/21  Yes MURRAY Maier CNP   sucralfate (CARAFATE) 1 GM tablet Take 1 tablet by mouth 4 times daily To be taken as a slurry. Instruct patient how to create slurry at home.  8/31/21  Yes Joni Velásquez MD   alendronate (FOSAMAX) 70 MG tablet Take 1 tablet by mouth every 7 days On Thursdays 8/24/21  Yes MURRYA Maier CNP   dicyclomine (BENTYL) 10 MG capsule Take 1 capsule by mouth 4 times daily 8/9/21  Yes Joni Velásquez MD   metoprolol tartrate (LOPRESSOR) 50 MG tablet TAKE 1 TABLET TWICE A DAY 8/4/21  Yes MURRAY Maier CNP   omeprazole (PRILOSEC) 20 MG delayed release capsule Take 1 capsule by mouth 2 times daily (before meals) 8/4/21  Yes MURRAY Maier CNP   sertraline (ZOLOFT) 50 MG tablet Take 1 tablet by mouth daily 6/23/21  Yes MURRAY Maier CNP   metFORMIN (GLUCOPHAGE) 500 MG tablet Take 1 tablet by mouth 2 times daily (with meals) 5/21/21  Yes MURRAY Maier - CNP   vitamin B-12 (CYANOCOBALAMIN) 100 MCG tablet 1,000 mcg  3/29/17  Yes Historical Provider, MD   Calcium Carbonate-Vitamin D (OYSTER SHELL CALCIUM/D) 500-200 MG-UNIT TABS Take 1 tablet by mouth 2 times daily (with meals)   Yes Historical Provider, MD   aspirin 81 MG EC tablet Take 81 mg by mouth daily   Yes Historical Provider, MD   albuterol sulfate HFA (VENTOLIN HFA) 108 (90 Base) MCG/ACT inhaler Inhale 2 puffs into the lungs 4 times daily as needed for Wheezing 3/16/21  Yes Juanetta Nails, APRN - CNP   ciprofloxacin (CIPRO) 500 MG tablet Take 1 tablet by mouth 2 times daily for 7 days  Patient not taking: Reported on 1/26/2022 1/19/22 1/26/22  Juanetta Nails, APRN - CNP   gabapentin (NEURONTIN) 800 MG tablet Take 1 tablet by mouth 2 times daily for 90 days. 10/18/21 1/16/22  Juanetta Nails, APRN - CNP        Allergies:       Penicillins    Social History:     Tobacco:    reports that she has been smoking cigarettes. She has a 50.00 pack-year smoking history. She has never used smokeless tobacco.  Alcohol:      reports current alcohol use. Drug Use:  reports no history of drug use. Family History:     Family History   Problem Relation Age of Onset    Diabetes Mother     Heart Disease Mother     Cancer Father     Diabetes Brother     Cancer Brother     High Blood Pressure Sister        Review of Systems:     Positive and Negative as described in HPI    Review of Systems   Gastrointestinal: Positive for abdominal pain and diarrhea. Negative for abdominal distention and vomiting. Physical Exam:   Vitals:  /84   Pulse 74   Temp 97.3 °F (36.3 °C)   Resp 14   Ht 5' 9.5\" (1.765 m)   Wt 180 lb (81.6 kg)   SpO2 97%   BMI 26.20 kg/m²     Physical Exam  Constitutional:       General: She is not in acute distress. Appearance: Normal appearance. She is normal weight. She is not ill-appearing or toxic-appearing. HENT:      Head: Normocephalic. Cardiovascular:      Rate and Rhythm: Normal rate and regular rhythm. Heart sounds: Normal heart sounds. No murmur heard. Pulmonary:      Effort: Pulmonary effort is normal. No respiratory distress. Breath sounds: Normal breath sounds. No stridor. No wheezing, rhonchi or rales. Abdominal:      General: Abdomen is flat. Bowel sounds are normal. There is no distension. Palpations: Abdomen is soft. There is no mass. Tenderness: There is abdominal tenderness (tenderness at epigastric area and LLQ). There is no guarding. Hernia: No hernia is present. Neurological:      Mental Status: She is alert and oriented to person, place, and time. Psychiatric:         Mood and Affect: Mood normal.         Behavior: Behavior normal.         Thought Content: Thought content normal.         Judgment: Judgment normal.         Data:     Lab Results   Component Value Date     01/19/2022    K 4.3 01/19/2022     01/19/2022    CO2 27 01/19/2022    BUN 29 01/19/2022    CREATININE 0.68 01/19/2022    GLUCOSE 99 01/19/2022    PROT 7.9 01/19/2022    LABALBU 4.3 01/19/2022    BILITOT 0.60 01/19/2022    ALKPHOS 87 01/19/2022    AST 12 01/19/2022    ALT 11 01/19/2022     Lab Results   Component Value Date    WBC 4.9 01/19/2022    RBC 5.05 01/19/2022    HGB 14.0 01/19/2022    HCT 45.2 01/19/2022    MCV 89.5 01/19/2022    MCH 27.7 01/19/2022    MCHC 31.0 01/19/2022    RDW 13.7 01/19/2022     01/19/2022    MPV 11.0 01/19/2022     No results found for: TSH  Lab Results   Component Value Date    CHOL 115 03/25/2021    HDL 55 03/25/2021    LABA1C 6.5 01/19/2022       Assessment/Plan:      Diagnosis Orders   1. Diarrhea, unspecified type  Clostridium Difficile Toxin/Antigen    Gastrointestinal Panel, Molecular    Ova and parasite screen    Blood Occult Stool #1    CT ABDOMEN PELVIS W IV CONTRAST Additional Contrast? Oral   2.  Generalized abdominal pain  CT ABDOMEN PELVIS W IV CONTRAST Additional Contrast? Oral     -Further evaluation with C. difficile, stool culture, ova and parasite, and occult blood stool  -Initiate Bentyl as needed for abdominal cramping  -Extend Flagyl and Cipro for an additional 3 days to make a full 10-day course of antibiotics for suspected diverticulitis  -Discussed pathophysiology of diverticulitis  -Due to ongoing symptoms with minimal relief, will further evaluate with CT abdomen pelvis. -Reviewed worsening signs and symptoms and when to present to the emergency department or to notify office.  -If symptoms worsen or persist, will consider referral to GI    Completed Refills   Requested Prescriptions     Signed Prescriptions Disp Refills    ciprofloxacin (CIPRO) 500 MG tablet 6 tablet 0     Sig: Take 1 tablet by mouth 2 times daily for 3 days    metroNIDAZOLE (FLAGYL) 500 MG tablet 9 tablet 0     Sig: Take 1 tablet by mouth 3 times daily for 3 days    dicyclomine (BENTYL) 10 MG capsule 60 capsule 1     Sig: Take 1 capsule by mouth 4 times daily as needed (abdominal cramping)       Orders Placed This Encounter   Procedures    Clostridium Difficile Toxin/Antigen     Standing Status:   Future     Standing Expiration Date:   1/26/2023    Gastrointestinal Panel, Molecular     Standing Status:   Future     Standing Expiration Date:   1/26/2023    Ova and parasite screen     Standing Status:   Future     Standing Expiration Date:   1/26/2023    CT ABDOMEN PELVIS W IV CONTRAST Additional Contrast? Oral     Standing Status:   Future     Standing Expiration Date:   1/26/2023     Order Specific Question:   Additional Contrast?     Answer:   Oral    Blood Occult Stool #1     Standing Status:   Future     Standing Expiration Date:   1/26/2023        No results found for this visit on 01/26/22. Return if symptoms worsen or fail to improve.     Electronically signed by MURRAY Villalobos CNP on 01/26/22 at 11:27 AM.

## 2022-01-26 NOTE — PATIENT INSTRUCTIONS
SURVEY:    You may be receiving a survey from [employer] regarding your visit today. Please complete the survey to enable us to provide the highest quality of care to you and your family. If you cannot score us a very good on any question, please call the office to discuss how we could of made your experience a very good one. Thank you.

## 2022-02-01 ENCOUNTER — HOSPITAL ENCOUNTER (OUTPATIENT)
Age: 72
Discharge: HOME | End: 2022-02-01
Payer: COMMERCIAL

## 2022-02-01 DIAGNOSIS — R19.7 DIARRHEA, UNSPECIFIED TYPE: Primary | ICD-10-CM

## 2022-02-01 DIAGNOSIS — R19.7 DIARRHEA, UNSPECIFIED TYPE: ICD-10-CM

## 2022-02-01 DIAGNOSIS — D64.89 ANEMIA DUE TO OTHER CAUSE, NOT CLASSIFIED: ICD-10-CM

## 2022-02-01 LAB
-: NORMAL
DATE, STOOL #1: ABNORMAL
DATE, STOOL #2: ABNORMAL
DATE, STOOL #3: ABNORMAL
HEMOCCULT SP1 STL QL: POSITIVE
HEMOCCULT SP2 STL QL: ABNORMAL
HEMOCCULT SP3 STL QL: ABNORMAL
REASON FOR REJECTION: NORMAL
TIME, STOOL #1: 1700
TIME, STOOL #2: ABNORMAL
TIME, STOOL #3: ABNORMAL
ZZ NTE CLEAN UP: ORDERED TEST: NORMAL
ZZ NTE WITH NAME CLEAN UP: SPECIMEN SOURCE: NORMAL

## 2022-02-01 PROCEDURE — 87329 GIARDIA AG IA: CPT

## 2022-02-01 PROCEDURE — 82272 OCCULT BLD FECES 1-3 TESTS: CPT

## 2022-02-01 PROCEDURE — 87506 IADNA-DNA/RNA PROBE TQ 6-11: CPT

## 2022-02-01 PROCEDURE — 87328 CRYPTOSPORIDIUM AG IA: CPT

## 2022-02-02 ENCOUNTER — TELEPHONE (OUTPATIENT)
Dept: FAMILY MEDICINE CLINIC | Age: 72
End: 2022-02-02

## 2022-02-02 DIAGNOSIS — R10.13 EPIGASTRIC ABDOMINAL PAIN: ICD-10-CM

## 2022-02-02 DIAGNOSIS — R19.7 DIARRHEA, UNSPECIFIED TYPE: Primary | ICD-10-CM

## 2022-02-02 DIAGNOSIS — K92.1 BLOOD IN STOOL: ICD-10-CM

## 2022-02-02 LAB
CAMPYLOBACTER PCR: NORMAL
DIRECT EXAM: NORMAL
DIRECT EXAM: NORMAL
E COLI ENTEROTOXIGENIC PCR: NORMAL
Lab: NORMAL
PLESIOMONAS SHIGELLOIDES PCR: NORMAL
SALMONELLA PCR: NORMAL
SHIGATOXIN GENE PCR: NORMAL
SHIGELLA SP PCR: NORMAL
SPECIMEN DESCRIPTION: NORMAL
SPECIMEN DESCRIPTION: NORMAL
VIBRIO PCR: NORMAL
YERSINIA ENTEROCOLITICA PCR: NORMAL

## 2022-02-02 NOTE — TELEPHONE ENCOUNTER
----- Message from MURRAY Felipe CNP sent at 2/2/2022  1:34 PM EST -----  Please notify ova and parasite screen negative. Her stool does show evidence of blood. D/t continued abdominal pain with the presence of +blood in the stool, I would like for her to be evaluated by GI.  Please place referral to [facility]

## 2022-02-08 ENCOUNTER — HOSPITAL ENCOUNTER (OUTPATIENT)
Dept: CT IMAGING | Age: 72
Discharge: HOME | End: 2022-02-10
Payer: COMMERCIAL

## 2022-02-08 ENCOUNTER — HOSPITAL ENCOUNTER (OUTPATIENT)
Age: 72
Discharge: HOME | End: 2022-02-10
Payer: COMMERCIAL

## 2022-02-08 ENCOUNTER — HOSPITAL ENCOUNTER (OUTPATIENT)
Dept: GENERAL RADIOLOGY | Age: 72
Discharge: HOME | End: 2022-02-10
Payer: COMMERCIAL

## 2022-02-08 DIAGNOSIS — R19.7 DIARRHEA, UNSPECIFIED TYPE: ICD-10-CM

## 2022-02-08 DIAGNOSIS — R10.84 GENERALIZED ABDOMINAL PAIN: ICD-10-CM

## 2022-02-08 DIAGNOSIS — J12.82 PNEUMONIA DUE TO COVID-19 VIRUS: ICD-10-CM

## 2022-02-08 DIAGNOSIS — U07.1 PNEUMONIA DUE TO COVID-19 VIRUS: ICD-10-CM

## 2022-02-08 PROCEDURE — 6360000004 HC RX CONTRAST MEDICATION: Performed by: NURSE PRACTITIONER

## 2022-02-08 PROCEDURE — 71046 X-RAY EXAM CHEST 2 VIEWS: CPT

## 2022-02-08 PROCEDURE — 74177 CT ABD & PELVIS W/CONTRAST: CPT

## 2022-02-08 RX ADMIN — IOPAMIDOL 75 ML: 755 INJECTION, SOLUTION INTRAVENOUS at 14:21

## 2022-02-08 RX ADMIN — IOPAMIDOL 18 ML: 755 INJECTION, SOLUTION INTRAVENOUS at 14:21

## 2022-02-14 ENCOUNTER — OFFICE VISIT (OUTPATIENT)
Dept: FAMILY MEDICINE CLINIC | Age: 72
End: 2022-02-14
Payer: COMMERCIAL

## 2022-02-14 VITALS
WEIGHT: 191 LBS | DIASTOLIC BLOOD PRESSURE: 84 MMHG | SYSTOLIC BLOOD PRESSURE: 130 MMHG | RESPIRATION RATE: 14 BRPM | HEART RATE: 71 BPM | BODY MASS INDEX: 27.35 KG/M2 | HEIGHT: 70 IN | OXYGEN SATURATION: 100 %

## 2022-02-14 DIAGNOSIS — R19.7 DIARRHEA, UNSPECIFIED TYPE: ICD-10-CM

## 2022-02-14 DIAGNOSIS — R07.9 CHEST PAIN, UNSPECIFIED TYPE: Primary | ICD-10-CM

## 2022-02-14 DIAGNOSIS — J18.9 PNEUMONIA DUE TO INFECTIOUS ORGANISM, UNSPECIFIED LATERALITY, UNSPECIFIED PART OF LUNG: ICD-10-CM

## 2022-02-14 PROCEDURE — 99214 OFFICE O/P EST MOD 30 MIN: CPT | Performed by: NURSE PRACTITIONER

## 2022-02-14 ASSESSMENT — ENCOUNTER SYMPTOMS
COUGH: 0
SHORTNESS OF BREATH: 0

## 2022-02-14 NOTE — PROGRESS NOTES
Name: Juan Luis Ann  : 1950         Chief Complaint:     Chief Complaint   Patient presents with    Follow-up     1 month covid / pnemonia check. xray completed. states the last three days she has been well. states she was having diarrhea but now having solid bm        History of Present Illness:      Juan Luis Ann is a 70 y.o.  female who presents with Follow-up (1 month covid / pnemonia check. xray completed. states the last three days she has been well. states she was having diarrhea but now having solid bm )      HPI     HPI 22 (today): The patient presents for pneumonia f/u. Denies cough, SOB, fever, or chills. She admits taking full course of doxycycline as prescribed. Chest XR 22 shows clear chest with no acute findings. She admits left-sided chest pain that lasts for 1 minute. Worse with inspiration. Pain is described as \"achy\". She first noticed this within the last several days. Pain occurs with activity. She last had a stress test \"a long time ago\",  likely greater than 5 years ago. Diarrhea:  She admits long history of diarrhea. Her diarrhea resolved 3 days ago. She admits a normal consistency BM. She is following with GI at [facility]. Upcoming appointment is scheduled in 1 week. CT abdomen pelvis on 22 showed no acute process in the abdomen or pelvis. HPI 22:  The patient was seen at [facility] emergency department 2022 with concerns of nonproductive cough, chills, nausea, general weakness, and fatigue that began 2 weeks prior to her ED visit. Per ED note per EMR her magnesium and potassium were low and she was given supplements. Her chest x-ray did show pneumonia. While in the emergency department her second COVID test was positive, per patient. She was offered monoclonal antibodies and completed the infusion 22 at [facility].  She was also prescribed doxycyline by the ED.       Past Medical History:     Past Medical History:   Diagnosis Date    Arthritis     Crespo's esophagus     Diabetes mellitus ([facility] [address]     Hypertension       Reviewed all health maintenance requirements and ordered appropriate tests  Health Maintenance Due   Topic Date Due    Diabetic retinal exam  Never done       Past Surgical History:     Past Surgical History:   Procedure Laterality Date    APPENDECTOMY  1969    BACK SURGERY  1994    upper disc    BACK SURGERY  2019    protruded disc    CHOLECYSTECTOMY      1988    COLONOSCOPY  05/2017    COLONOSCOPY  08/31/2021    Dr. Isai Jacob - normal colonoscopy    COLONOSCOPY N/A 8/31/2021    COLORECTAL CANCER SCREENING, NOT HIGH RISK performed by Doris Johnson MD at [address]    pilonidal    ENDOSCOPY, COLON, DIAGNOSTIC  07/2019    GASTRIC [address]LICATION  [address]    still has ovaries    JOINT REPLACEMENT Left 2015    HIP    JOINT REPLACEMENT Right     HIP    KNEE ARTHROSCOPY Bilateral 2000    LUMBAR FUSION N/A 01/18/2019    HARDWARE REMOVAL L4-S1, L3-4 DECOMPRESSION AND FUSION EXTENSION TO L3 performed by Norma Amaral MD at [address] [facility] [address] Right 06/09/2016    TOTAL KNEE ARTHROPLASTY Right 2002    TOTAL KNEE ARTHROPLASTY Left 2010    UPPER GASTROINTESTINAL ENDOSCOPY  05/2017    UPPER GASTROINTESTINAL ENDOSCOPY  08/31/2021    Dr. Isai Jacob - biopsies    UPPER GASTROINTESTINAL ENDOSCOPY N/A 8/31/2021    EGD BIOPSY performed by Doris Johnson MD at [address] Bilateral 2006    LEG        Medications:       Prior to Admission medications    Medication Sig Start Date End Date Taking?  Authorizing Provider   dicyclomine (BENTYL) 10 MG capsule Take 1 capsule by mouth 4 times daily as needed (abdominal cramping) 1/26/22  Yes MURRAY Ordonez CNP   atorvastatin (LIPITOR) 10 MG tablet Take 1 tablet by mouth daily 11/3/21  Yes MURRAY Ordonez CNP   meloxicam [facility]) 15 MG tablet Take 1 tablet by mouth daily 10/18/21  Yes Santos Mallory MURRAY Young CNP   meloxicam (MOBIC) 15 MG tablet Take 1 tablet by mouth daily 10/18/21  Yes MURRAY Prince CNP   sucralfate (CARAFATE) 1 GM tablet Take 1 tablet by mouth 4 times daily To be taken as a slurry. Instruct patient how to create slurry at home. 8/31/21  Yes Elizabeth Johnson MD   alendronate (FOSAMAX) 70 MG tablet Take 1 tablet by mouth every 7 days On Thursdays 8/24/21  Yes MURRAY Prince CNP   dicyclomine (BENTYL) 10 MG capsule Take 1 capsule by mouth 4 times daily 8/9/21  Yes Elizabeth Johnson MD   metoprolol tartrate (LOPRESSOR) 50 MG tablet TAKE 1 TABLET TWICE A DAY 8/4/21  Yes MURRAY Prince CNP   omeprazole (PRILOSEC) 20 MG delayed release capsule Take 1 capsule by mouth 2 times daily (before meals) 8/4/21  Yes MURRAY Prince CNP   sertraline (ZOLOFT) 50 MG tablet Take 1 tablet by mouth daily 6/23/21  Yes MURRAY Prince CNP   metFORMIN (GLUCOPHAGE) 500 MG tablet Take 1 tablet by mouth 2 times daily (with meals) 5/21/21  Yes MURRAY Prince CNP   vitamin B-12 (CYANOCOBALAMIN) 100 MCG tablet 1,000 mcg  3/29/17  Yes Historical Provider, MD   Calcium Carbonate-Vitamin D (OYSTER SHELL CALCIUM/D) 500-200 MG-UNIT TABS Take 1 tablet by mouth 2 times daily (with meals)   Yes Historical Provider, MD   aspirin 81 MG EC tablet Take 81 mg by mouth daily   Yes Historical Provider, MD   albuterol sulfate HFA (VENTOLIN HFA) 108 (90 Base) MCG/ACT inhaler Inhale 2 puffs into the lungs 4 times daily as needed for Wheezing 3/16/21  Yes MURRAY Prince CNP   gabapentin (NEURONTIN) 800 MG tablet Take 1 tablet by mouth 2 times daily for 90 days. 10/18/21 1/16/22  MURRAY Prince CNP        Allergies:       Penicillins    Social History:     Tobacco:    reports that she has been smoking cigarettes. She has a 50.00 pack-year smoking history. She has never used smokeless tobacco.  Alcohol:      reports current alcohol use.   Drug Use:  reports no history of drug use. Family History:     Family History   Problem Relation Age of Onset    Diabetes Mother     Heart Disease Mother     Cancer Father     Diabetes Brother     Cancer Brother     High Blood Pressure Sister        Review of Systems:     Positive and Negative as described in HPI    Review of Systems   Constitutional: Negative for chills and fever. Respiratory: Negative for cough and shortness of breath. Cardiovascular: Positive for chest pain. Genitourinary:        Admits left nipple discharge that has been ongoing x1 year. Occurs 3-4 times per week. Discharge is clear. Denies bleeding from the nipple. Denies masses in the breast.   Skin:        Patient admits concerns with several moles on her back       Physical Exam:   Vitals:  /84   Pulse 71   Resp 14   Ht 5' 9.5\" (1.765 m)   Wt 191 lb (86.6 kg)   SpO2 100%   BMI 27.80 kg/m²     Physical Exam  Constitutional:       General: She is not in acute distress. Appearance: Normal appearance. She is normal weight. She is not ill-appearing or toxic-appearing. HENT:      Head: Normocephalic. Cardiovascular:      Rate and Rhythm: Normal rate and regular rhythm. Heart sounds: Normal heart sounds. No murmur heard. Pulmonary:      Effort: Pulmonary effort is normal. No respiratory distress. Breath sounds: Normal breath sounds. No stridor. No wheezing, rhonchi or rales. Skin:     General: Skin is warm. Neurological:      Mental Status: She is alert and oriented to person, place, and time. Psychiatric:         Mood and Affect: Mood normal.         Behavior: Behavior normal.         Thought Content:  Thought content normal.         Judgment: Judgment normal.         Data:     Lab Results   Component Value Date     01/19/2022    K 4.3 01/19/2022     01/19/2022    CO2 27 01/19/2022    BUN 29 01/19/2022    CREATININE 0.68 01/19/2022    GLUCOSE 99 01/19/2022    PROT 7.9 01/19/2022    LABALBU 4.3 01/19/2022 BILITOT 0.60 01/19/2022    ALKPHOS 87 01/19/2022    AST 12 01/19/2022    ALT 11 01/19/2022     Lab Results   Component Value Date    WBC 4.9 01/19/2022    RBC 5.05 01/19/2022    HGB 14.0 01/19/2022    HCT 45.2 01/19/2022    MCV 89.5 01/19/2022    MCH 27.7 01/19/2022    MCHC 31.0 01/19/2022    RDW 13.7 01/19/2022     01/19/2022    MPV 11.0 01/19/2022     No results found for: TSH  Lab Results   Component Value Date    CHOL 115 03/25/2021    HDL 55 03/25/2021    LABA1C 6.5 01/19/2022       Assessment/Plan:      Diagnosis Orders   1. Chest pain, unspecified type  CARDIAC STRESS TEST EXERCISE ONLY    EKG 12 lead   2. Pneumonia due to infectious organism, unspecified laterality, unspecified part of lung     3. Diarrhea, unspecified type         Pneumonia:  -Reviewed repeat chest x-ray 2/8/2022 showing clear chest with no acute finding  -Vital signs reviewed today, WNL  -Symptoms improving  -No new interventions at this time    Diarrhea:   --Chronic  --Following with GI at [facility]  --Reviewed non-acute CT abdomen/pelvis findings 2/2022    Left Nipple Discharge:   --F/u to discuss at next appointment    Derm:   --F/u to discuss at next appointment. Chest pain:  -EKG and stress test ordered today  -Reviewed emergent signs of chest pain and when to present to the ED    Completed Refills   Requested Prescriptions      No prescriptions requested or ordered in this encounter       Orders Placed This Encounter   Procedures    CARDIAC STRESS TEST EXERCISE ONLY     Standing Status:   Future     Standing Expiration Date:   4/15/2022     Order Specific Question:   Reason for Exam?     Answer:   Chest pain    EKG 12 lead     Standing Status:   Future     Standing Expiration Date:   4/15/2022     Order Specific Question:   Reason for Exam?     Answer:   Chest pain        No results found for this visit on 02/14/22.     Return if symptoms worsen or fail to improve, for L nipple discharge + full body skin check (40 mins).    Electronically signed by MURRAY Stevens CNP on 02/14/22 at 1:09 PM.

## 2022-03-01 RX ORDER — ALENDRONATE SODIUM 70 MG/1
TABLET ORAL
Qty: 8 TABLET | Refills: 1 | Status: SHIPPED | OUTPATIENT
Start: 2022-03-01 | End: 2022-06-01

## 2022-04-11 RX ORDER — METOPROLOL TARTRATE 50 MG/1
TABLET, FILM COATED ORAL
Qty: 180 TABLET | Refills: 0 | Status: SHIPPED | OUTPATIENT
Start: 2022-04-11 | End: 2022-04-19

## 2022-04-19 RX ORDER — METOPROLOL TARTRATE 50 MG/1
TABLET, FILM COATED ORAL
Qty: 180 TABLET | Refills: 3 | Status: SHIPPED | OUTPATIENT
Start: 2022-04-19 | End: 2022-06-07 | Stop reason: SDUPTHER

## 2022-04-19 RX ORDER — MELOXICAM 15 MG/1
15 TABLET ORAL DAILY
Qty: 30 TABLET | Refills: 0 | Status: SHIPPED | OUTPATIENT
Start: 2022-04-19 | End: 2022-06-07 | Stop reason: SDUPTHER

## 2022-04-25 RX ORDER — GABAPENTIN 800 MG/1
800 TABLET ORAL 2 TIMES DAILY
Qty: 180 TABLET | Refills: 1 | Status: SHIPPED | OUTPATIENT
Start: 2022-04-25 | End: 2022-06-07 | Stop reason: SDUPTHER

## 2022-04-25 RX ORDER — GABAPENTIN 800 MG/1
TABLET ORAL
Qty: 180 TABLET | Refills: 1 | OUTPATIENT
Start: 2022-04-25

## 2022-06-01 RX ORDER — ALENDRONATE SODIUM 70 MG/1
TABLET ORAL
Qty: 8 TABLET | Refills: 1 | Status: SHIPPED | OUTPATIENT
Start: 2022-06-01 | End: 2022-06-07 | Stop reason: SDUPTHER

## 2022-06-07 ENCOUNTER — OFFICE VISIT (OUTPATIENT)
Dept: PRIMARY CARE CLINIC | Age: 72
End: 2022-06-07
Payer: COMMERCIAL

## 2022-06-07 VITALS
OXYGEN SATURATION: 99 % | WEIGHT: 189 LBS | RESPIRATION RATE: 14 BRPM | HEIGHT: 70 IN | SYSTOLIC BLOOD PRESSURE: 120 MMHG | TEMPERATURE: 97 F | DIASTOLIC BLOOD PRESSURE: 76 MMHG | HEART RATE: 87 BPM | BODY MASS INDEX: 27.06 KG/M2

## 2022-06-07 DIAGNOSIS — M54.50 CHRONIC BILATERAL LOW BACK PAIN WITHOUT SCIATICA: Primary | ICD-10-CM

## 2022-06-07 DIAGNOSIS — M54.9 CHRONIC MID BACK PAIN: ICD-10-CM

## 2022-06-07 DIAGNOSIS — G89.29 CHRONIC MID BACK PAIN: ICD-10-CM

## 2022-06-07 DIAGNOSIS — G89.29 CHRONIC BILATERAL LOW BACK PAIN WITHOUT SCIATICA: Primary | ICD-10-CM

## 2022-06-07 PROCEDURE — 99214 OFFICE O/P EST MOD 30 MIN: CPT | Performed by: NURSE PRACTITIONER

## 2022-06-07 PROCEDURE — 1123F ACP DISCUSS/DSCN MKR DOCD: CPT | Performed by: NURSE PRACTITIONER

## 2022-06-07 NOTE — PROGRESS NOTES
Name: Rodolfo Lucero  : 1950         Chief Complaint:     Chief Complaint   Patient presents with    Back Pain     center and low back pain. daily pain. started a couple months ago.  Extremity Weakness     bilateral leg weakness. pain is worse in summer. states it comes  like this. History of Present Illness:      Rodolfo Lucero is a 70 y.o.  female who presents with Back Pain (center and low back pain. daily pain. started a couple months ago. ) and Extremity Weakness (bilateral leg weakness. pain is worse in summer. states it comes ev year like this. )      HPI    The patient presents with back pain that started 2 months ago. Her back pain is located low and mid back, and spans across her back. Pain is described as \"terrible\". Pain is constant and present daily. Pain is worse with standing and walking. Pain is relieved with sitting and lying. She takes tylenol with minimal relief. She is taking meloxicam 15mg QD. Denies injury to her back. She admits neuropathy bilaterally, chronic for several years. She was previously being seen by neurology for this who managed her gabapentin 800mg BID. Leg pain is worse in the summer when compared to the winter. Denies saddle anesthesia. Denies new or abnormal bowel or bladder incontinence. She admits chronic urinary incontinence for years where she stands up to go to the bathroom and has some urine leakage. Denies fever or chills. She underwent spinal surgery for \"protruding disc\" 6 years ago.      Past Medical History:     Past Medical History:   Diagnosis Date    Arthritis     Crespo's esophagus     Diabetes mellitus ([facility] [address]     Hypertension       Reviewed all health maintenance requirements and ordered appropriate tests  Health Maintenance Due   Topic Date Due    Diabetic retinal exam  Never done    Hepatitis C screen  Never done    DTaP/Tdap/Td vaccine (1 - Tdap) Never done    Shingles vaccine (1 of 2) Never done    Diabetic microalbuminuria test  03/25/2022    Lipids  03/25/2022    Low dose CT lung screening  04/09/2022    Diabetic foot exam  06/23/2022       Past Surgical History:     Past Surgical History:   Procedure Laterality Date    APPENDECTOMY  1969    BACK SURGERY  1994    upper disc    BACK SURGERY  2019    protruded disc    CHOLECYSTECTOMY      1988    COLONOSCOPY  05/2017    COLONOSCOPY  08/31/2021    Dr. Cristóbal Love - normal colonoscopy    COLONOSCOPY N/A 8/31/2021    COLORECTAL CANCER SCREENING, NOT HIGH RISK performed by Yola Abebe MD at [address] [facility]    pilonidal    ENDOSCOPY, COLON, DIAGNOSTIC  07/2019    GASTRIC FUNDOPLICATION  [address]    still has ovaries    JOINT REPLACEMENT Left 2015    HIP    JOINT REPLACEMENT Right     HIP    KNEE ARTHROSCOPY Bilateral 2000    LUMBAR FUSION N/A 01/18/2019    HARDWARE REMOVAL L4-S1, L3-4 DECOMPRESSION AND FUSION EXTENSION TO L3 performed by Fredy Ortega MD at [address] [facility] Lissette Vargas Right 06/09/2016    TOTAL KNEE ARTHROPLASTY Right 2002    TOTAL KNEE ARTHROPLASTY Left 2010    UPPER GASTROINTESTINAL ENDOSCOPY  05/2017    UPPER GASTROINTESTINAL ENDOSCOPY  08/31/2021    Dr. Cristóbal Love - biopsies    UPPER GASTROINTESTINAL ENDOSCOPY N/A 8/31/2021    EGD BIOPSY performed by Yola Abebe MD at [address] [facility] [address] Bilateral 2006    LEG        Medications:       Prior to Admission medications    Medication Sig Start Date End Date Taking? Authorizing Provider   meloxicam (MOBIC) 15 MG tablet Take 1 tablet by mouth daily 6/8/22  Yes MURRAY Lynch CNP   alendronate (FOSAMAX) 70 MG tablet TAKE 1 TABLET EVERY 7 DAYS ON THURSDAYS 6/8/22  Yes MURRAY Lynch CNP   gabapentin (NEURONTIN) 800 MG tablet Take 1 tablet by mouth 2 times daily for 90 days.  6/8/22 9/6/22 Yes MURRAY Lynch CNP   metoprolol tartrate (LOPRESSOR) 50 MG tablet TAKE 1 TABLET TWICE A DAY 6/8/22  Yes Deborah Perez MURRAY Knight CNP   atorvastatin (LIPITOR) 10 MG tablet Take 1 tablet by mouth daily 6/8/22  Yes MURRAY Reyes CNP   metFORMIN (GLUCOPHAGE) 500 MG tablet Take 1 tablet by mouth 2 times daily (with meals) 6/8/22  Yes MURRAY Reyes CNP   sertraline (ZOLOFT) 50 MG tablet Take 1 tablet by mouth daily 6/8/22  Yes MURRAY Reyes CNP   dicyclomine (BENTYL) 10 MG capsule Take 1 capsule by mouth 4 times daily as needed (abdominal cramping) 6/8/22  Yes MURRAY Reyes CNP   sucralfate (CARAFATE) 1 GM tablet Take 1 tablet by mouth 4 times daily To be taken as a slurry. Instruct patient how to create slurry at home. 8/31/21  Yes Radha Degroot MD   dicyclomine (BENTYL) 10 MG capsule Take 1 capsule by mouth 4 times daily 8/9/21  Yes Radha Degroot MD   omeprazole (PRILOSEC) 20 MG delayed release capsule Take 1 capsule by mouth 2 times daily (before meals) 8/4/21  Yes MURRAY Reyes CNP   vitamin B-12 (CYANOCOBALAMIN) 100 MCG tablet 1,000 mcg  3/29/17  Yes Historical Provider, MD   Calcium Carbonate-Vitamin D (OYSTER SHELL CALCIUM/D) 500-200 MG-UNIT TABS Take 1 tablet by mouth 2 times daily (with meals)   Yes Historical Provider, MD   aspirin 81 MG EC tablet Take 81 mg by mouth daily   Yes Historical Provider, MD   albuterol sulfate HFA (VENTOLIN HFA) 108 (90 Base) MCG/ACT inhaler Inhale 2 puffs into the lungs 4 times daily as needed for Wheezing 3/16/21  Yes MURRAY Reyes CNP        Allergies:       Penicillins    Social History:     Tobacco:    reports that she has been smoking cigarettes. She has a 50.00 pack-year smoking history. She has never used smokeless tobacco.  Alcohol:      reports current alcohol use. Drug Use:  reports no history of drug use.     Family History:     Family History   Problem Relation Age of Onset    Diabetes Mother     Heart Disease Mother     Cancer Father     Diabetes Brother     Cancer Brother     High Blood Pressure Sister Review of Systems:     Positive and Negative as described in HPI    Review of Systems   Constitutional: Negative for chills and fever. Musculoskeletal: Positive for arthralgias. Physical Exam:   Vitals:  /76   Pulse 87   Temp 97 °F (36.1 °C)   Resp 14   Ht 5' 9.5\" (1.765 m)   Wt 189 lb (85.7 kg)   SpO2 99%   BMI 27.51 kg/m²     Physical Exam  Constitutional:       General: She is not in acute distress. Appearance: Normal appearance. She is normal weight. She is not ill-appearing or toxic-appearing. Cardiovascular:      Rate and Rhythm: Normal rate and regular rhythm. Heart sounds: Normal heart sounds. No murmur heard. Pulmonary:      Effort: Pulmonary effort is normal. No respiratory distress. Breath sounds: Normal breath sounds. No stridor. No wheezing, rhonchi or rales. Musculoskeletal:      Comments: Generalized tenderness to palpation thoracic spine, lumbar spine, and sacroiliac joints bilaterally. Previous surgical scar along lumbar spine. Negative straight leg test bilaterally. Skin:     General: Skin is warm. Comments: Multiple scattered, noninflamed seborrheic keratosis noted to mid back and lower back. Single, black-colored papule with irregular borders located left scapula. No surrounding erythema. Neurological:      Mental Status: She is alert and oriented to person, place, and time. Psychiatric:         Mood and Affect: Mood normal.         Behavior: Behavior normal.         Thought Content:  Thought content normal.         Judgment: Judgment normal.         Data:     Lab Results   Component Value Date     01/19/2022    K 4.3 01/19/2022     01/19/2022    CO2 27 01/19/2022    BUN 29 01/19/2022    CREATININE 0.68 01/19/2022    GLUCOSE 99 01/19/2022    PROT 7.9 01/19/2022    LABALBU 4.3 01/19/2022    BILITOT 0.60 01/19/2022    ALKPHOS 87 01/19/2022    AST 12 01/19/2022    ALT 11 01/19/2022     Lab Results   Component Value Date WBC 4.9 01/19/2022    RBC 5.05 01/19/2022    HGB 14.0 01/19/2022    HCT 45.2 01/19/2022    MCV 89.5 01/19/2022    MCH 27.7 01/19/2022    MCHC 31.0 01/19/2022    RDW 13.7 01/19/2022     01/19/2022    MPV 11.0 01/19/2022     No results found for: TSH  Lab Results   Component Value Date    CHOL 115 03/25/2021    HDL 55 03/25/2021    LABA1C 6.5 01/19/2022       Assessment/Plan:      Diagnosis Orders   1. Chronic bilateral low back pain without sciatica  XR LUMBAR SPINE (2-3 VIEWS)    [facility] [place]   2. Chronic mid back pain  XR THORACIC SPINE (2 VIEWS)    [facility] [place]     - Further evaluation with thoracic and lumbar x-rays today. - Recommend ice and heat  - Encouraged gentle stretching  - Continue Mobic 15 mg daily and Tylenol as needed  - Encouraged physical therapy, patient is agreeable. Referral to [facility], PT placed today. - Will call with results of x-rays and update treatment plan as appropriate. - Follow-up 4 weeks for back pain reevaluation or sooner with any concerns    Skin lesion:  - Discussed my concerns with the abnormal skin lesion with irregular borders and colors. We will schedule shave biopsy to send for pathology. Completed Refills   Requested Prescriptions     Signed Prescriptions Disp Refills    meloxicam (MOBIC) 15 MG tablet 30 tablet 3     Sig: Take 1 tablet by mouth daily    alendronate (FOSAMAX) 70 MG tablet 8 tablet 3     Sig: TAKE 1 TABLET EVERY 7 DAYS ON THURSDAYS    gabapentin (NEURONTIN) 800 MG tablet 180 tablet 3     Sig: Take 1 tablet by mouth 2 times daily for 90 days.     metoprolol tartrate (LOPRESSOR) 50 MG tablet 180 tablet 3     Sig: TAKE 1 TABLET TWICE A DAY    atorvastatin (LIPITOR) 10 MG tablet 90 tablet 3     Sig: Take 1 tablet by mouth daily    metFORMIN (GLUCOPHAGE) 500 MG tablet 180 tablet 3     Sig: Take 1 tablet by mouth 2 times daily (with meals)    sertraline (ZOLOFT) 50 MG tablet 90 tablet 3     Sig: Take 1 tablet by mouth daily    dicyclomine (BENTYL) 10 MG capsule 90 capsule 3     Sig: Take 1 capsule by mouth 4 times daily as needed (abdominal cramping)       Orders Placed This Encounter   Procedures    XR LUMBAR SPINE (2-3 VIEWS)     Standing Status:   Future     Standing Expiration Date:   6/7/2023    XR THORACIC SPINE (2 VIEWS)     Standing Status:   Future     Standing Expiration Date:   6/7/2023   [address] [facility]     Referral Priority:   Routine     Referral Type:   Eval and Treat     Referral Reason:   Specialty Services Required     Requested Specialty:   Physical Therapist     Number of Visits Requested:   1        No results found for this visit on 06/07/22. Return in about 4 weeks (around 7/5/2022), or if symptoms worsen or fail to improve, for back pain f/u .     Electronically signed by MURRAY Contreras CNP on 06/09/22 at 3:09 PM.

## 2022-06-08 RX ORDER — ALENDRONATE SODIUM 70 MG/1
TABLET ORAL
Qty: 8 TABLET | Refills: 3 | Status: SHIPPED | OUTPATIENT
Start: 2022-06-08

## 2022-06-08 RX ORDER — ATORVASTATIN CALCIUM 10 MG/1
10 TABLET, FILM COATED ORAL DAILY
Qty: 90 TABLET | Refills: 3 | Status: SHIPPED | OUTPATIENT
Start: 2022-06-08

## 2022-06-08 RX ORDER — GABAPENTIN 800 MG/1
800 TABLET ORAL 2 TIMES DAILY
Qty: 180 TABLET | Refills: 3 | Status: SHIPPED | OUTPATIENT
Start: 2022-06-08 | End: 2022-10-17

## 2022-06-08 RX ORDER — METOPROLOL TARTRATE 50 MG/1
TABLET, FILM COATED ORAL
Qty: 180 TABLET | Refills: 3 | Status: SHIPPED | OUTPATIENT
Start: 2022-06-08

## 2022-06-08 RX ORDER — MELOXICAM 15 MG/1
15 TABLET ORAL DAILY
Qty: 30 TABLET | Refills: 3 | Status: SHIPPED | OUTPATIENT
Start: 2022-06-08 | End: 2022-09-19

## 2022-06-08 RX ORDER — DICYCLOMINE HYDROCHLORIDE 10 MG/1
10 CAPSULE ORAL 4 TIMES DAILY PRN
Qty: 90 CAPSULE | Refills: 3 | Status: SHIPPED | OUTPATIENT
Start: 2022-06-08 | End: 2022-08-17 | Stop reason: CLARIF

## 2022-06-09 ENCOUNTER — TELEPHONE (OUTPATIENT)
Dept: PRIMARY CARE CLINIC | Age: 72
End: 2022-06-09

## 2022-06-16 ENCOUNTER — HOSPITAL ENCOUNTER (OUTPATIENT)
Dept: GENERAL RADIOLOGY | Age: 72
Discharge: HOME | End: 2022-06-18
Payer: COMMERCIAL

## 2022-06-16 ENCOUNTER — HOSPITAL ENCOUNTER (OUTPATIENT)
Age: 72
Discharge: HOME | End: 2022-06-18
Payer: COMMERCIAL

## 2022-06-16 DIAGNOSIS — G89.29 CHRONIC BILATERAL LOW BACK PAIN WITHOUT SCIATICA: ICD-10-CM

## 2022-06-16 DIAGNOSIS — G89.29 CHRONIC MID BACK PAIN: ICD-10-CM

## 2022-06-16 DIAGNOSIS — M54.9 CHRONIC MID BACK PAIN: ICD-10-CM

## 2022-06-16 DIAGNOSIS — M54.50 CHRONIC BILATERAL LOW BACK PAIN WITHOUT SCIATICA: ICD-10-CM

## 2022-06-16 PROCEDURE — 72100 X-RAY EXAM L-S SPINE 2/3 VWS: CPT

## 2022-06-16 PROCEDURE — 72070 X-RAY EXAM THORAC SPINE 2VWS: CPT

## 2022-06-27 ENCOUNTER — PROCEDURE VISIT (OUTPATIENT)
Dept: PRIMARY CARE CLINIC | Age: 72
End: 2022-06-27

## 2022-06-27 ENCOUNTER — HOSPITAL ENCOUNTER (OUTPATIENT)
Age: 72
Setting detail: SPECIMEN
Discharge: HOME | End: 2022-06-27
Payer: COMMERCIAL

## 2022-06-27 DIAGNOSIS — L98.9 SKIN LESION, SUPERFICIAL: ICD-10-CM

## 2022-06-27 DIAGNOSIS — R23.8 PAPULE: Primary | ICD-10-CM

## 2022-06-27 PROCEDURE — 88305 TISSUE EXAM BY PATHOLOGIST: CPT

## 2022-06-27 PROCEDURE — 99999 PR OFFICE/OUTPT VISIT,PROCEDURE ONLY: CPT | Performed by: NURSE PRACTITIONER

## 2022-06-28 NOTE — PATIENT INSTRUCTIONS
SURVEY:    You may be receiving a survey from [employer] regarding your visit today. Please complete the survey to enable us to provide the highest quality of care to you and your family. If you cannot score us a very good on any question, please call the office to discuss how we could have made your experience a very good one. Thank you.

## 2022-06-28 NOTE — PROGRESS NOTES
[address]  1950    The patient presented for shave biopsy of suspicious lesion to her left scapula. Single, black-colored papule with irregular borders located left scapula. No surrounding erythema. While the patient was being prepped with betadine swab, the lesion was unintentionally removed. Patient did not complain of pain. There was little/no bleeding. Site was covered with a bandage. No procedure was completed, including no injection of numbing agent nor any type of biopsy procedure. Specimen was sent for pathology. Will call with results. S/S infection were reviewed with patient. She will call with any concerns. Will call patient with results.

## 2022-06-30 LAB — DERMATOLOGY PATHOLOGY REPORT: NORMAL

## 2022-08-17 ENCOUNTER — OFFICE VISIT (OUTPATIENT)
Dept: PRIMARY CARE CLINIC | Age: 72
End: 2022-08-17
Payer: COMMERCIAL

## 2022-08-17 VITALS
DIASTOLIC BLOOD PRESSURE: 74 MMHG | OXYGEN SATURATION: 97 % | WEIGHT: 183 LBS | BODY MASS INDEX: 26.2 KG/M2 | HEIGHT: 70 IN | SYSTOLIC BLOOD PRESSURE: 122 MMHG | HEART RATE: 50 BPM

## 2022-08-17 DIAGNOSIS — R42 VERTIGO: Primary | ICD-10-CM

## 2022-08-17 DIAGNOSIS — R51.9 ACUTE NONINTRACTABLE HEADACHE, UNSPECIFIED HEADACHE TYPE: ICD-10-CM

## 2022-08-17 DIAGNOSIS — R55 SYNCOPE, UNSPECIFIED SYNCOPE TYPE: ICD-10-CM

## 2022-08-17 PROCEDURE — 99214 OFFICE O/P EST MOD 30 MIN: CPT | Performed by: STUDENT IN AN ORGANIZED HEALTH CARE EDUCATION/TRAINING PROGRAM

## 2022-08-17 PROCEDURE — 1123F ACP DISCUSS/DSCN MKR DOCD: CPT | Performed by: STUDENT IN AN ORGANIZED HEALTH CARE EDUCATION/TRAINING PROGRAM

## 2022-08-17 RX ORDER — ALBUTEROL SULFATE 90 UG/1
2 AEROSOL, METERED RESPIRATORY (INHALATION) 4 TIMES DAILY PRN
Qty: 1 EACH | Refills: 2 | Status: SHIPPED | OUTPATIENT
Start: 2022-08-17

## 2022-08-17 RX ORDER — GLUCOSAMINE HCL/CHONDROITIN SU 500-400 MG
CAPSULE ORAL
Qty: 100 STRIP | Refills: 1 | Status: SHIPPED | OUTPATIENT
Start: 2022-08-17

## 2022-08-17 NOTE — PROGRESS NOTES
[address], [address] , [facility], [address]  1950 is a 70 y.o. female, Established patient, here for evaluation of the following chief complaint(s):    ED Follow-up and Post-COVID Symptoms       ASSESSMENT/PLAN:      1. Vertigo  -     CTA HEAD NECK W CONTRAST; Future  -     EEG awake and asleep; Future  -     Severiano Fontana MD, Neurology, [place]  -     ECHO Complete 2D W Doppler W Color; Future  -     Longterm Continuous Cardiac Event Monitor; Future  2. Syncope, unspecified syncope type  -     CTA HEAD NECK W CONTRAST; Future  -     EEG awake and asleep; Future  -     Severiano Fontana MD, Neurology, [place]  -     ECHO Complete 2D W Doppler W Color; Future  -     Longterm Continuous Cardiac Event Monitor; Future  3. Acute nonintractable headache, unspecified headache type  -     CTA HEAD NECK W CONTRAST; Future  -     EEG awake and asleep; Future  -     Severiano Fontana MD, Neurology, [place]  -     ECHO Complete 2D W Doppler W Color; Future  -     Longterm Continuous Cardiac Event Monitor; Future     Right inner thigh lesion was evaluated today, suspected infected hair follicle, topical antibiotics and warm compresses to the affected area. If no resolution, will plan for PO antibiotics at that time - patient will call the office to check-in with us. We will obtain labs for further evaluation of the patient's acute Neurological symptoms and provide her with a referral to Neurology for additional management recommendations.     Medications Discontinued During This Encounter   Medication Reason    dicyclomine (BENTYL) 10 MG capsule ERROR    dicyclomine (BENTYL) 10 MG capsule ERROR    omeprazole (PRILOSEC) 20 MG delayed release capsule ERROR    sucralfate (CARAFATE) 1 GM tablet ERROR    albuterol sulfate HFA (VENTOLIN HFA) 108 (90 Base) MCG/ACT inhaler REORDER        Return in about 1 month (around 9/17/2022) for F/U Imaging/ECHO. [address] received counseling on the following healthy behaviors: nutrition, exercise and medication adherence. I encouraged and discussed lifestyle modifications including diet and exercise and the patient was agreeable to making positive/beneficial changes to both to help improve their overall health. Discussed use, benefit, and side effects of prescribed medications. Barriers to medication compliance addressed. Patient given educational materials: see patient instructions. HM - HM items completed today as per orders. Outstanding HM items though not limited to immunizations were discussed with the patient today, including risks, benefits and alternatives. The patient will discuss these during the next appointment per their preference. If there are any worsening or concerning signs or symptoms, patient will report to the ED and/or contact [phone] for immediate evaluation. Teach back method was used. All patient questions answered. Pt voiced understanding. Subjective    SUBJECTIVE/OBJECTIVE:    HPI   ED Follow-up and Post-COVID Symptoms  Positive on 8/5/22 symptoms started 8/3/22  Characteristics/Radiation/Quality - lower extremity weakness / tremors, upper body involuntary convulsions, syncope at work , inability to talk/have normal speech. She continues to have a headache with associated vertigo at this time. She denies chest pain , SOB, palpations - admits increased fatigue , and has been feeling like herself about 1 week later . [facility] said she was covid + and dehydrated. The patient had been following with  for many years. The patient had been following with Neurology for polyneuropathy. The patient had prior TIAs/mini stroke in the past. No known hx of any seizure disorder. Prior US Carotids years ago 10-15 years - WNL. EEG in the past. The patient had prior Cardiac w/u that was WNL in the past. No known hx of Afib.   Aggravating Factors - nothing in particular at this time  Relieving Factors/Treatment tried - resting only    CT Head 8/5/2022  \"FINDINGS:     No acute intracranial hemorrhage. No mass effect or midline shift. No extra-axial fluid collections. The ventricles and sulci are prominent consistent with global atrophy. Low-attenuation areas identified in the periventricular white matter consistent with microvascular ischemia. No depressed calvarial fracture. Mild mucosal thickening in the paranasal sinuses. IMPRESSION:     *  No acute hemorrhage or mass. \"      Skin Lesion   Eduardo Villanueva is a 70 y.o. female who was referred to me for evaluation and treatment of a skin lesion of the right inner thigh for 3 weeks. Lesion has not changed in time. Symptoms associated with the lesion are: itching only. Patient denies increasing diameter, increasing thickness, increasing number of lesions, darkening color, bleeding, tendency to be traumatized, unsightliness, pain, drainage, infection.       Family History   Problem Relation Age of Onset    Diabetes Mother     Heart Disease Mother     Cancer Father     Diabetes Brother     Cancer Brother     High Blood Pressure Sister        Health Maintenance   Alcohol/Substance use History - None/Minimal  Smoking History    Tobacco Use      Smoking status: Every Day        Packs/day: 1.00        Years: 50.00        Pack years: 50        Types: Cigarettes      Smokeless tobacco: Never      Health Maintenance   Topic Date Due    Diabetic retinal exam  Never done    Hepatitis C screen  Never done    DTaP/Tdap/Td vaccine (1 - Tdap) Never done    Shingles vaccine (1 of 2) Never done    Diabetic microalbuminuria test  03/25/2022    Lipids  03/25/2022    Low dose CT lung screening  04/09/2022    Diabetic foot exam  06/23/2022    COVID-19 Vaccine (3 - Booster for Pfizer series) 01/01/2023 (Originally 8/22/2021)    Flu vaccine (1) 09/01/2022    A1C test (Diabetic or Prediabetic)  01/19/2023 Depression Monitoring  01/19/2023    Annual Wellness Visit (AWV)  01/20/2023    Breast cancer screen  05/03/2023    Colorectal Cancer Screen  08/31/2031    DEXA (modify frequency per FRAX score)  Completed    Pneumococcal 65+ years Vaccine  Completed    Hepatitis A vaccine  Aged Out    Hib vaccine  Aged Out    Meningococcal (ACWY) vaccine  Aged Out      Depression Screening  PHQ Scores 1/19/2022 6/23/2021 3/16/2021   PHQ2 Score 3 4 0   PHQ9 Score 5 8 0     Interpretation of Total Score Depression Severity: 1-4 = Minimal depression, 5-9 = Mild depression, 10-14 = Moderate depression, 15-19 = Moderately severe depression, 20-27 = Severe depression      Review of Systems   Constitutional: Negative for activity change, appetite change, chills, diaphoresis, fatigue, fever and unexpected weight change. HENT: Negative for sinus pressure, sinus pain, sore throat and trouble swallowing. Respiratory: Negative for cough, shortness of breath and wheezing. Cardiovascular: Negative for chest pain, palpitations and leg swelling. Gastrointestinal: Negative for abdominal pain, diarrhea, nausea and vomiting. Endocrine: Negative for cold intolerance, polydipsia, polyphagia and polyuria. Genitourinary: Negative for difficulty urinating, flank pain and frequency. Musculoskeletal: Negative for gait problem and joint swelling. Negative for back pain, neck pain and neck stiffness. Skin: Negative for color change and wound. Negative for pallor and rash. Allergic/Immunologic: Negative for environmental allergies and food allergies. Neurological: Negative for light-headedness, numbness and headaches. Psychiatric/Behavioral: Negative for sleep disturbance. Negative for confusion and suicidal ideas. Objective    Physical Exam   Constitutional: Patient is oriented to person, place, and time. Patient appears well-developed and well-nourished. No distress. HENT: Head: Normocephalic and atraumatic.    Eyes: Pupils are equal, round, and reactive to light. Conjunctivae are normal. Right eye exhibits no discharge. Left eye exhibits no discharge. Cardiovascular: Normal rate, regular rhythm and normal heart sounds. Pulmonary/Chest: Effort normal and breath sounds normal. No respiratory distress. Patient has no wheezes. Abdominal: Soft. Bowel sounds are normal. Patient exhibits no distension. There is no tenderness. Musculoskeletal:  Patient exhibits no edema and tenderness. Patient exhibits no deformity. Neurological: Patient is alert and oriented to person, place, and time. Neurological Exam: alert, oriented, normal speech, no focal findings or movement disorder noted, screening mental status exam normal, neck supple without rigidity, cranial nerves II through XII intact, funduscopic exam normal, discs flat and sharp, DTR's normal and symmetric, Babinski sign negative, motor and sensory grossly normal bilaterally, normal muscle tone, no tremors, strength 5/5, Romberg sign negative, normal gait and station. Abnormal tandem gait walk (chronic per patient), Normal FNT, normal coordination. Skin: Skin is warm and dry. Patient is not diaphoretic. Location:   right inner thigh   Color:  Red    Diameter:  0.5 mm x 0.5mm   Border/Symmetry:  elevated. Inflammation:  present   Adhesion:  Adherent to adjacent tissues. Psychiatric: Patient's speech is normal and behavior is normal. Thought content normal.   Vitals reviewed.     /74   Pulse 50   Ht 5' 9.5\" (1.765 m)   Wt 183 lb (83 kg)   SpO2 97%   BMI 26.64 kg/m²   BP Readings from Last 3 Encounters:   08/17/22 122/74   06/07/22 120/76   02/14/22 130/84     Lab Results   Component Value Date    WBC 4.9 01/19/2022    HGB 14.0 01/19/2022    HCT 45.2 01/19/2022     01/19/2022    CHOL 115 03/25/2021    TRIG 54 03/25/2021    HDL 55 03/25/2021    ALT 11 01/19/2022    AST 12 01/19/2022     01/19/2022    K 4.3 01/19/2022     01/19/2022 CREATININE 0.68 01/19/2022    BUN 29 (H) 01/19/2022    CO2 27 01/19/2022    INR 0.93 01/03/2019    LABA1C 6.5 (H) 01/19/2022    LABMICR 11 03/25/2021     Lab Results   Component Value Date    CALCIUM 10.0 01/19/2022     Lab Results   Component Value Date    LDLCHOLESTEROL 49 03/25/2021       CURRENT MEDS W/ ASSOC DIAG           Start Date End Date     albuterol sulfate HFA (VENTOLIN HFA) 108 (90 Base) MCG/ACT inhaler  03/16/21  --     Inhale 2 puffs into the lungs 4 times daily as needed for Wheezing     Associated Diagnoses:  --     alendronate (FOSAMAX) 70 MG tablet  06/08/22  --     TAKE 1 TABLET EVERY 7 DAYS ON THURSDAYS     Associated Diagnoses:  --     aspirin 81 MG EC tablet  --  --     Associated Diagnoses:  --     atorvastatin (LIPITOR) 10 MG tablet  06/08/22  --     Take 1 tablet by mouth daily     Associated Diagnoses:  --     Calcium Carbonate-Vitamin D (OYSTER SHELL CALCIUM/D) 500-200 MG-UNIT TABS  --  --     Associated Diagnoses:  --     dicyclomine (BENTYL) 10 MG capsule  08/09/21  --     Take 1 capsule by mouth 4 times daily     Associated Diagnoses:  --     dicyclomine (BENTYL) 10 MG capsule  06/08/22  --     Take 1 capsule by mouth 4 times daily as needed (abdominal cramping)     Associated Diagnoses:  --     gabapentin (NEURONTIN) 800 MG tablet  06/08/22 09/06/22     Take 1 tablet by mouth 2 times daily for 90 days.      Associated Diagnoses:  --     meloxicam (MOBIC) 15 MG tablet  06/08/22  --     Take 1 tablet by mouth daily     Associated Diagnoses:  --     metFORMIN (GLUCOPHAGE) 500 MG tablet  06/08/22  --     Take 1 tablet by mouth 2 times daily (with meals)     Associated Diagnoses:  --     metoprolol tartrate (LOPRESSOR) 50 MG tablet  06/08/22  --     TAKE 1 TABLET TWICE A DAY     Associated Diagnoses:  --     omeprazole (PRILOSEC) 20 MG delayed release capsule  08/04/21  --     Take 1 capsule by mouth 2 times daily (before meals)     Associated Diagnoses:  --     sertraline (ZOLOFT) 50 MG tablet  06/08/22  --     Take 1 tablet by mouth daily     Associated Diagnoses:  --     sucralfate (CARAFATE) 1 GM tablet  08/31/21  --     Take 1 tablet by mouth 4 times daily To be taken as a slurry. Instruct patient how to create slurry at home. Associated Diagnoses:  --     vitamin B-12 (CYANOCOBALAMIN) 100 MCG tablet  03/29/17  --     Associated Diagnoses:  --              Please note that this chart was generated using voice recognition Dragon dictation software. Although every effort was made to ensure the accuracy of this automated transcription, some errors in transcription may have occurred.   Electronically signed by Js Casillas MD on 8/17/2022 at 10:59 AM

## 2022-08-31 ENCOUNTER — HOSPITAL ENCOUNTER (OUTPATIENT)
Dept: CT IMAGING | Age: 72
Discharge: HOME | End: 2022-09-02
Payer: COMMERCIAL

## 2022-08-31 ENCOUNTER — HOSPITAL ENCOUNTER (OUTPATIENT)
Dept: NEUROLOGY | Age: 72
Discharge: HOME | End: 2022-08-31
Payer: COMMERCIAL

## 2022-08-31 ENCOUNTER — HOSPITAL ENCOUNTER (OUTPATIENT)
Dept: NON INVASIVE DIAGNOSTICS | Age: 72
Discharge: HOME | End: 2022-08-31
Payer: COMMERCIAL

## 2022-08-31 DIAGNOSIS — R42 VERTIGO: ICD-10-CM

## 2022-08-31 DIAGNOSIS — R55 SYNCOPE, UNSPECIFIED SYNCOPE TYPE: ICD-10-CM

## 2022-08-31 DIAGNOSIS — R51.9 ACUTE NONINTRACTABLE HEADACHE, UNSPECIFIED HEADACHE TYPE: ICD-10-CM

## 2022-08-31 PROCEDURE — 70496 CT ANGIOGRAPHY HEAD: CPT

## 2022-08-31 PROCEDURE — 6360000004 HC RX CONTRAST MEDICATION: Performed by: STUDENT IN AN ORGANIZED HEALTH CARE EDUCATION/TRAINING PROGRAM

## 2022-08-31 PROCEDURE — 93242 EXT ECG>48HR<7D RECORDING: CPT

## 2022-08-31 PROCEDURE — 93243 EXT ECG>48HR<7D SCAN A/R: CPT

## 2022-08-31 PROCEDURE — 95819 EEG AWAKE AND ASLEEP: CPT

## 2022-08-31 RX ADMIN — IOPAMIDOL 75 ML: 755 INJECTION, SOLUTION INTRAVENOUS at 07:41

## 2022-09-02 NOTE — PROCEDURES
[address], [place] [address]                          ELECTROENCEPHALOGRAM REPORT    PATIENT NAME: Ambika Donovan                   :        1950  MED REC NO:   [id number]                              ROOM:  ACCOUNT NO:   [de-identified]                           ADMIT DATE: 2022  PROVIDER:     Hansa Santana    DATE OF EE2022    REASON FOR STUDY:  The patient is a 70-year-old lady with history of  \"mini strokes\" and also symptoms of intermittent jerking movements and  syncope. SUMMARY:  This EEG was recorded with standard international 10-20  montages. The predominant background rhythm comprises of low amplitude, symmetrically  distributed alpha activity of 8 to 9 Hz while awake. No focal  slowing or epileptic activity. Cardiac rhythm is normal.   Hyperventilation is unremarkable. There is symmetrical harmonic photic  driving. No sleep activity. INTERPRETATION:  Normal awake EEG with no focal abnormality.         Concepción García    D: 2022 17:05:12       T: 2022 18:41:34     [email or web address]  Job#: [id number]     Doc#: [id number]    CC:  MURRAY Loredo-CNP

## 2022-09-19 RX ORDER — MELOXICAM 15 MG/1
15 TABLET ORAL DAILY PRN
Qty: 30 TABLET | Refills: 0 | Status: SHIPPED | OUTPATIENT
Start: 2022-09-19 | End: 2022-09-20 | Stop reason: SDUPTHER

## 2022-09-20 ENCOUNTER — HOSPITAL ENCOUNTER (OUTPATIENT)
Age: 72
Setting detail: SPECIMEN
Discharge: HOME | End: 2022-09-20
Payer: COMMERCIAL

## 2022-09-20 ENCOUNTER — OFFICE VISIT (OUTPATIENT)
Dept: PRIMARY CARE CLINIC | Age: 72
End: 2022-09-20
Payer: COMMERCIAL

## 2022-09-20 VITALS
DIASTOLIC BLOOD PRESSURE: 62 MMHG | HEART RATE: 53 BPM | RESPIRATION RATE: 18 BRPM | TEMPERATURE: 97 F | WEIGHT: 189.2 LBS | OXYGEN SATURATION: 97 % | SYSTOLIC BLOOD PRESSURE: 131 MMHG | BODY MASS INDEX: 28.02 KG/M2 | HEIGHT: 69 IN

## 2022-09-20 DIAGNOSIS — L97.929 ULCER OF LEFT LOWER EXTREMITY, UNSPECIFIED ULCER STAGE (HCC): Primary | ICD-10-CM

## 2022-09-20 DIAGNOSIS — S99.912A ANKLE INJURY, LEFT, INITIAL ENCOUNTER: ICD-10-CM

## 2022-09-20 DIAGNOSIS — R55 SYNCOPE, UNSPECIFIED SYNCOPE TYPE: ICD-10-CM

## 2022-09-20 DIAGNOSIS — L97.929 ULCER OF LEFT LOWER EXTREMITY, UNSPECIFIED ULCER STAGE (HCC): ICD-10-CM

## 2022-09-20 DIAGNOSIS — R42 VERTIGO: ICD-10-CM

## 2022-09-20 PROBLEM — U07.1 COVID-19: Status: ACTIVE | Noted: 2022-01-14

## 2022-09-20 PROCEDURE — 1123F ACP DISCUSS/DSCN MKR DOCD: CPT | Performed by: NURSE PRACTITIONER

## 2022-09-20 PROCEDURE — 99214 OFFICE O/P EST MOD 30 MIN: CPT | Performed by: NURSE PRACTITIONER

## 2022-09-20 PROCEDURE — 87205 SMEAR GRAM STAIN: CPT

## 2022-09-20 PROCEDURE — 87070 CULTURE OTHR SPECIMN AEROBIC: CPT

## 2022-09-20 RX ORDER — MELOXICAM 15 MG/1
15 TABLET ORAL DAILY PRN
Qty: 90 TABLET | Refills: 0 | Status: SHIPPED | OUTPATIENT
Start: 2022-09-20

## 2022-09-20 RX ORDER — DOXYCYCLINE HYCLATE 100 MG
100 TABLET ORAL 2 TIMES DAILY
Qty: 14 TABLET | Refills: 0 | Status: SHIPPED | OUTPATIENT
Start: 2022-09-20 | End: 2022-09-27

## 2022-09-20 SDOH — ECONOMIC STABILITY: FOOD INSECURITY: WITHIN THE PAST 12 MONTHS, YOU WORRIED THAT YOUR FOOD WOULD RUN OUT BEFORE YOU GOT MONEY TO BUY MORE.: NEVER TRUE

## 2022-09-20 SDOH — ECONOMIC STABILITY: FOOD INSECURITY: WITHIN THE PAST 12 MONTHS, THE FOOD YOU BOUGHT JUST DIDN'T LAST AND YOU DIDN'T HAVE MONEY TO GET MORE.: NEVER TRUE

## 2022-09-20 ASSESSMENT — SOCIAL DETERMINANTS OF HEALTH (SDOH): HOW HARD IS IT FOR YOU TO PAY FOR THE VERY BASICS LIKE FOOD, HOUSING, MEDICAL CARE, AND HEATING?: NOT HARD AT ALL

## 2022-09-20 NOTE — PROGRESS NOTES
Name: Dorina Diaz  : 1950         Chief Complaint:     Chief Complaint   Patient presents with    Dizziness     Patient for follow up for vertigo. Patient states she went to ER  because she passed out at work. She was ordered a heart monitor for 10 days. Patient reports no dizziness or fainting since that episode. Ankle Injury     Patient fell on  and went to [facility] ED where she received stitches to her left ankle. Patient removed stitches, patient continues with complaint of pain, redness to area and has noted 2x2 cm wound to left inner ankle. History of Present Illness:      Dorina Diaz is a 67 y.o.  female who presents with Dizziness (Patient for follow up for vertigo. Patient states she went to ER  because she passed out at work. She was ordered a heart monitor for 10 days. Patient reports no dizziness or fainting since that episode. ) and Ankle Injury (Patient fell on  and went to [facility] ED where she received stitches to her left ankle. Patient removed stitches, patient continues with complaint of pain, redness to area and has noted 2x2 cm wound to left inner ankle. )      HPI    Dizziness F/u:   The patient presents for follow-up of dizziness. She suffered a syncopal episode for which she was evaluated at [facility] ED. She then followed up in primary care office and was evaluated by Dr. Nadir Prasad on 2022 for concerns of vertigo, syncope, and headache. At this time she was ordered CTA head neck with contrast, EEG, echocardiogram, CAM, and referred to neurology. CTA head neck with contrast and EEG results as noted below. She did completed CAM but has not returned this to cardiology. She denies additional episodes of dizziness or syncopal episodes. She admits history TIA (\"mini strokes\") for which she previously followed with neurologist. Per patient, her most recent TIA 3 years ago. She is on atorvastatin 10mg QD and aspirin 81mg QD.      CTA Head Neck w Contrast 8/31/22:  1. No acute intracranial abnormality. Chronic small vessel ischemic disease. 2. Unremarkable CTA of the head and neck. No large vessel occlusion. 3. Mild atherosclerotic disease. EEG 9/1/22:  INTERPRETATION:  Normal awake EEG with no focal abnormality. Ankle Injury:  Per patient, she fell on 8/17/22 and went to [facility] ED where she received stiches to her left ankle. Per patient, she received 4 stiches. The ED recommended she remove them in 10 days but she did not follow up in 10 days to remove them. She self-removed them using tweezers and scissors this morning 9/20/22. She confirms that she removed all 4 stitches. She confirms that she did not have XR while in the ED. She is complaining today of redness, pain, and wound to left ankle. She admits some bleeding and drainage from the site. The drainage is red in color. She was cleaning the wound with peroxide and cortisone cream. Denies fever or chills.      Past Medical History:     Past Medical History:   Diagnosis Date    Arthritis     Crespo's esophagus     Diabetes mellitus ([facility] [address]     Hypertension       Reviewed all health maintenance requirements and ordered appropriate tests  Health Maintenance Due   Topic Date Due    DTaP/Tdap/Td vaccine (1 - Tdap) Never done    Diabetic microalbuminuria test  03/25/2022    Lipids  03/25/2022    Low dose CT lung screening  04/09/2022    Diabetic foot exam  06/23/2022    Flu vaccine (1) 09/01/2022       Past Surgical History:     Past Surgical History:   Procedure Laterality Date    [facility]    upper disc    BACK SURGERY  2019    protruded disc    CHOLECYSTECTOMY      1988    COLONOSCOPY  05/2017    COLONOSCOPY  08/31/2021    Dr. Maegan Guadalupe - normal colonoscopy    COLONOSCOPY N/A 8/31/2021    COLORECTAL CANCER SCREENING, NOT HIGH RISK performed by Júnior Vasquez MD at [facility]    ENDOSCOPY, COLON, DIAGNOSTIC  07/2019    GASTRIC FUNDOPLICATION  7736    HYSTERECTOMY (CERVIX STATUS UNKNOWN)  1998    still has ovaries    JOINT REPLACEMENT Left 2015    HIP    JOINT REPLACEMENT Right     HIP    KNEE ARTHROSCOPY Bilateral 2000    LUMBAR FUSION N/A 01/18/2019    HARDWARE REMOVAL L4-S1, L3-4 DECOMPRESSION AND FUSION EXTENSION TO L3 performed by Saqib Dsouza MD at [address] Right 06/09/2016    TOTAL KNEE ARTHROPLASTY Right 2002    TOTAL KNEE ARTHROPLASTY Left 2010    UPPER GASTROINTESTINAL ENDOSCOPY  05/2017    UPPER GASTROINTESTINAL ENDOSCOPY  08/31/2021    Dr. Logan Sharp - biopsies    UPPER GASTROINTESTINAL ENDOSCOPY N/A 8/31/2021    EGD BIOPSY performed by Arlene Alarcon MD at [facility] Bilateral 2006    LEG        Medications:       Prior to Admission medications    Medication Sig Start Date End Date Taking? Authorizing Provider   meloxicam (MOBIC) 15 MG tablet Take 1 tablet by mouth daily as needed for Pain 9/20/22  Yes MURRAY Barba CNP   mupirocin (BACTROBAN) 2 % ointment Apply topically to affected area 3 times daily. 9/20/22 9/27/22 Yes MURRAY Barba CNP   doxycycline hyclate (VIBRA-TABS) 100 MG tablet Take 1 tablet by mouth 2 times daily for 7 days 9/20/22 9/27/22 Yes MURRAY Barba CNP   albuterol sulfate HFA (VENTOLIN HFA) 108 (90 Base) MCG/ACT inhaler Inhale 2 puffs into the lungs 4 times daily as needed for Wheezing 8/17/22  Yes Nitin Johnston MD   blood glucose monitor strips Brand per patient preference. Test 1 times a day & as needed for symptoms of irregular blood glucose. Dispense sufficient amount for indicated testing frequency plus additional to accommodate PRN testing needs. 8/17/22  Yes Nitin Johnston MD   alendronate (FOSAMAX) 70 MG tablet TAKE 1 TABLET EVERY 7 DAYS ON THURSDAYS 6/8/22  Yes MURRAY Barba CNP   gabapentin (NEURONTIN) 800 MG tablet Take 1 tablet by mouth 2 times daily for 90 days.  6/8/22 9/20/22 Yes MURRAY Barba CNP metoprolol tartrate (LOPRESSOR) 50 MG tablet TAKE 1 TABLET TWICE A DAY 6/8/22  Yes MURRAY Bernal CNP   atorvastatin (LIPITOR) 10 MG tablet Take 1 tablet by mouth daily 6/8/22  Yes MURRAY Bernal CNP   metFORMIN (GLUCOPHAGE) 500 MG tablet Take 1 tablet by mouth 2 times daily (with meals) 6/8/22  Yes MURRAY Bernal CNP   sertraline (ZOLOFT) 50 MG tablet Take 1 tablet by mouth daily 6/8/22  Yes MURRAY Bernal CNP   vitamin B-12 (CYANOCOBALAMIN) 100 MCG tablet 1,000 mcg  3/29/17  Yes Historical Provider, MD   Calcium Carbonate-Vitamin D (OYSTER SHELL CALCIUM/D) 500-200 MG-UNIT TABS Take 1 tablet by mouth 2 times daily (with meals)   Yes Historical Provider, MD   aspirin 81 MG EC tablet Take 81 mg by mouth daily   Yes Historical Provider, MD        Allergies:       Penicillins    Social History:     Tobacco:    reports that she has been smoking cigarettes. She has a 50.00 pack-year smoking history. She has never used smokeless tobacco.  Alcohol:      reports current alcohol use. Drug Use:  reports no history of drug use. Family History:     Family History   Problem Relation Age of Onset    Diabetes Mother     Heart Disease Mother     Cancer Father     Diabetes Brother     Cancer Brother     High Blood Pressure Sister        Review of Systems:     Positive and Negative as described in HPI    Review of Systems   Constitutional:  Negative for fever. Skin:  Positive for wound. Neurological:  Negative for dizziness and syncope. Physical Exam:   Vitals:  /62 (Site: Right Upper Arm, Position: Sitting)   Pulse 53   Temp 97 °F (36.1 °C)   Resp 18   Ht 5' 9\" (1.753 m)   Wt 189 lb 3.2 oz (85.8 kg)   SpO2 97%   BMI 27.94 kg/m²     Physical Exam  Constitutional:       General: She is not in acute distress. Appearance: Normal appearance. She is normal weight. She is not ill-appearing or toxic-appearing.    Cardiovascular:      Rate and Rhythm: Normal rate and regular rhythm. Heart sounds: Normal heart sounds. No murmur heard. Pulmonary:      Effort: Pulmonary effort is normal. No respiratory distress. Breath sounds: Normal breath sounds. No stridor. No wheezing, rhonchi or rales. Skin:     Comments: Left medial ankle with 1 cm x 1 cm ulceration. Mild erythema surrounding. Scant serosanguinous drainage and bleeding present. Single suture present, removed during visit without difficulty. Neurological:      Mental Status: She is alert. Psychiatric:         Mood and Affect: Mood normal.         Behavior: Behavior normal.         Thought Content: Thought content normal.         Judgment: Judgment normal.       Data:     Lab Results   Component Value Date/Time     01/19/2022 11:34 AM    K 4.3 01/19/2022 11:34 AM     01/19/2022 11:34 AM    CO2 27 01/19/2022 11:34 AM    BUN 29 01/19/2022 11:34 AM    CREATININE 0.68 01/19/2022 11:34 AM    GLUCOSE 99 01/19/2022 11:34 AM    PROT 7.9 01/19/2022 11:34 AM    LABALBU 4.3 01/19/2022 11:34 AM    BILITOT 0.60 01/19/2022 11:34 AM    ALKPHOS 87 01/19/2022 11:34 AM    AST 12 01/19/2022 11:34 AM    ALT 11 01/19/2022 11:34 AM     Lab Results   Component Value Date/Time    WBC 4.9 01/19/2022 11:34 AM    RBC 5.05 01/19/2022 11:34 AM    HGB 14.0 01/19/2022 11:34 AM    HCT 45.2 01/19/2022 11:34 AM    MCV 89.5 01/19/2022 11:34 AM    MCH 27.7 01/19/2022 11:34 AM    MCHC 31.0 01/19/2022 11:34 AM    RDW 13.7 01/19/2022 11:34 AM     01/19/2022 11:34 AM    MPV 11.0 01/19/2022 11:34 AM     No results found for: TSH  Lab Results   Component Value Date/Time    CHOL 115 03/25/2021 12:35 PM    HDL 55 03/25/2021 12:35 PM    LABA1C 6.5 01/19/2022 11:34 AM       Assessment/Plan:      Diagnosis Orders   1. Ulcer of left lower extremity, unspecified ulcer stage (HCC)  Culture, Wound Aerobic Only      2. Syncope, unspecified syncope type [M66 (ICD-10-CM)]        3. Vertigo Renetta.Amina (ICD-10-CM)]        4.  Ankle injury, left, initial encounter [L25.453M (ICD-10-CM)]          Dizziness:   - No additional dizziness or syncopal episodes  - Reviewed CTA head and neck as well as EEG results  - Patient to turn in CAM to cardiopulmonary, will review results when complete  - Echo results not yet complete for review, she did confirm she had this completed  - Patient admits history of TIA. She is on aspirin and statin therapy. Upcoming appointment scheduled with neurology 10/2022  -- Request ED note    Left ankle ulceration:  -- Request ED note  - Status post injury. Patient self-removed sutures herself this morning prior to visit. Single suture remains in place, removed during today's appointment without difficulty. - Discouraged use of peroxide  - Encourage cleansing ulceration using soap and water running over the lesion while in shower, pat dry  - Apply topical mupirocin 3 times daily. Keep covered with bandage  - Initiate doxycyline (PCN allergy with hives reaction)  - Reviewed worsening signs and symptoms of cellulitis and when to seek care at the emergency department  - Culture completed today, will call with results and adjust oral therapy as needed  - Discussed importance of improving circulation. Encouraged compression stockings and keeping legs elevated      Completed Refills   Requested Prescriptions     Signed Prescriptions Disp Refills    meloxicam (MOBIC) 15 MG tablet 90 tablet 0     Sig: Take 1 tablet by mouth daily as needed for Pain    mupirocin (BACTROBAN) 2 % ointment 1 each 0     Sig: Apply topically to affected area 3 times daily.     doxycycline hyclate (VIBRA-TABS) 100 MG tablet 14 tablet 0     Sig: Take 1 tablet by mouth 2 times daily for 7 days       Orders Placed This Encounter   Procedures    Culture, Wound Aerobic Only     Standing Status:   Future     Number of Occurrences:   1     Standing Expiration Date:   9/20/2023     Order Specific Question:   Description:     Answer:   Aerobic Only          No results found for this visit on 09/20/22. Return in about 1 week (around 9/27/2022), or if symptoms worsen or fail to improve, for skin check .     Electronically signed by MURRAY Nova CNP on 09/20/22 at 12:41 PM.

## 2022-09-22 LAB
CULTURE: NORMAL
DIRECT EXAM: NORMAL
DIRECT EXAM: NORMAL
SPECIMEN DESCRIPTION: NORMAL

## 2022-09-27 ENCOUNTER — HOSPITAL ENCOUNTER (OUTPATIENT)
Dept: NON INVASIVE DIAGNOSTICS | Age: 72
Discharge: HOME | End: 2022-09-27
Payer: COMMERCIAL

## 2022-09-27 DIAGNOSIS — R51.9 ACUTE NONINTRACTABLE HEADACHE, UNSPECIFIED HEADACHE TYPE: ICD-10-CM

## 2022-09-27 DIAGNOSIS — R42 VERTIGO: ICD-10-CM

## 2022-09-27 DIAGNOSIS — R55 SYNCOPE, UNSPECIFIED SYNCOPE TYPE: ICD-10-CM

## 2022-09-27 LAB
LV EF: 65 %
LVEF MODALITY: NORMAL

## 2022-09-27 PROCEDURE — 93306 TTE W/DOPPLER COMPLETE: CPT

## 2022-09-29 ENCOUNTER — TELEMEDICINE (OUTPATIENT)
Dept: PRIMARY CARE CLINIC | Age: 72
End: 2022-09-29
Payer: COMMERCIAL

## 2022-09-29 DIAGNOSIS — I10 ESSENTIAL (PRIMARY) HYPERTENSION: ICD-10-CM

## 2022-09-29 DIAGNOSIS — I51.7 HYPERTROPHIC CARDIOMEGALY: ICD-10-CM

## 2022-09-29 DIAGNOSIS — R55 SYNCOPE, UNSPECIFIED SYNCOPE TYPE: Primary | ICD-10-CM

## 2022-09-29 DIAGNOSIS — I51.89 DIASTOLIC DYSFUNCTION: ICD-10-CM

## 2022-09-29 PROCEDURE — 1123F ACP DISCUSS/DSCN MKR DOCD: CPT | Performed by: NURSE PRACTITIONER

## 2022-09-29 PROCEDURE — 99213 OFFICE O/P EST LOW 20 MIN: CPT | Performed by: NURSE PRACTITIONER

## 2022-09-29 NOTE — PATIENT INSTRUCTIONS
SURVEY:    You may be receiving a survey from [employer] regarding your visit today. Please complete the survey to enable us to provide the highest quality of care to you and your family. If you cannot score us a very good on any question, please call the office to discuss how we could of made your experience a very good one. Thank you.

## 2022-09-29 NOTE — PROGRESS NOTES
[address] (:  1950) is a Established patient, here for evaluation of the following:    Chief Complaint   Patient presents with    Chest Pain     Telephone visit to discuss echo results       Subjective     HPI    The patient presents virtually via telephone to discuss echocardiogram results. Patient completed echocardiogram 2022 ordered by Dr. Shea Dempsey for concerns of vertigo, syncope, headache. Echocardiogram results as noted below:    Echocardiogram 22:  Global left ventricular systolic function appears preserved with an  estimated ejection fraction of 65%. The left ventricular cavity size is within normal limits and the left  ventricular wall thickness is mildly increased. The patient has moderate assymetric septal hypertrophy without evidence of a  significant resting or valsalva induced outflow tract obstruction. The left atrium is mildly dilated (29-33) with a left atrial volume index of  32 ml/m2. Normal mitral valve structure with mild mitral regurgitation. Evidence of moderate diastolic dysfunction is seen. The patient has phenotypic evidence of hypertrophic cardiomyopathy. Although  no significant gradient was seen, given the patients reported history of  syncope, consider additional testing and/or treatment if clinically  indicated. Today, the patient states that she has not had any additional syncopal episodes since her original syncopal episode 2022 for which she was evaluated for at [facility] ED. She denies chest pain, but does admit 3 single episodes of left arm pain that occurred at rest.  She admits occasional shortness of breath with activity. She denies family history of hypertrophic cardiomyopathy. She has never been evaluated by cardiologist in the past.    Review of Systems  Denies chest pain   Admits syncope    Objective     Patient-Reported Vitals  No data recorded     Physical Exam  N/A    Assessment:   Diagnosis Orders   1.  Syncope, unspecified syncope

## 2022-10-12 NOTE — PROCEDURES
[address] [place] [address]                                 EVENT MONITOR    PATIENT NAME: Dakota Palm                   :        1950  MED REC NO:   [id number]                              ROOM:  ACCOUNT NO:   [de-identified]                           ADMIT DATE: 2022  PROVIDER:     Jaspal Hayes MD    CARDIOVASCULAR DIAGNOSTIC DEPARTMENT    DATE OF STUDY:  2022    ORDERING PROVIDER:  Amarilys De La Cruz MD    PRIMARY CARE PROVIDER:  PARTH Arshad    INTERPRETING PHYSICIAN:  Jaspal Hayes MD    DIAGNOSIS:  Syncope and collapse. PHYSICIAN INTERPRETATION:  1.  7 days and 2 hours recorded. 2.  Baseline rhythm is sinus with average heart rate 61 bpm, ranging  between 41 and 111 bpm.    3.  Atrial tachycardia:  44 episodes. Longest, 18 beats at average 120  bpm up to 144 bpm.  Fastest, 3 beats at average 125 bpm up to 133 bpm.    4. PAC 1.19%. 5.  PVC 0.10%.         Arpan Watters MD    D: 10/12/2022 16:45:43       T: 10/12/2022 16:46:50     [email or web address]  Job#: [id number]     Doc#: Unknown    CC:  PARTH Arshad MD

## 2022-10-17 ENCOUNTER — OFFICE VISIT (OUTPATIENT)
Dept: NEUROLOGY | Age: 72
End: 2022-10-17
Payer: COMMERCIAL

## 2022-10-17 VITALS
HEART RATE: 66 BPM | DIASTOLIC BLOOD PRESSURE: 83 MMHG | RESPIRATION RATE: 18 BRPM | BODY MASS INDEX: 26.51 KG/M2 | WEIGHT: 185.2 LBS | HEIGHT: 70 IN | SYSTOLIC BLOOD PRESSURE: 167 MMHG | TEMPERATURE: 97.3 F

## 2022-10-17 DIAGNOSIS — R55 SYNCOPE, UNSPECIFIED SYNCOPE TYPE: Primary | ICD-10-CM

## 2022-10-17 PROCEDURE — 1123F ACP DISCUSS/DSCN MKR DOCD: CPT | Performed by: NEUROMUSCULOSKELETAL MEDICINE, SPORTS MEDICINE

## 2022-10-17 PROCEDURE — 99214 OFFICE O/P EST MOD 30 MIN: CPT | Performed by: NEUROMUSCULOSKELETAL MEDICINE, SPORTS MEDICINE

## 2022-10-17 NOTE — PROGRESS NOTES
NEUROLOGY follow up    Patient Name:  Kelin Pinto  :   1950  Clinic Visit Date: 10/17/2022    I saw Ms. Kelin Pinto  in the neurology clinic today for evaluation of either seizure or syncopal episode. 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 .  According to her history, she was sitting talking to some customers and all of a sudden felt uneasy, followed by her sudden onset of upper body  jerking movement for a minute, and brief loss of consciousness. She was helped by her coworkers. She did not fall. No incontinence, or tongue bite. No symptoms of headache, double vision blurred vision vertigo, chest pain or other cardiac symptoms prior to the episode. Work-up has included an unremarkable CT scan of the brain ,normal EEG and normal echocardiogram.  She has been doing well since then. No recurrent symptoms. About 3 years ago, she had had a similar episode with loss of consciousness and was taken to the hospital emergency room, where she was told that she had passed out because of dehydration. REVIEW OF SYSTEMS    Constitutional Weight changes: absent, change in appetite: absent Fatigue: absent; Fevers : absent, Any recent hospitalizations:  absent   HEENT Ears: diminished,  Visual disturbance: absent   Respiratory Shortness of breath: absent, choking:  absent, Cough: present, Snoring : absent   Cardiovascular Chest pain: absent, Leg swelling :absent, palpitations : absent, fainting : present   GI Constipation: absent, Diarrhea: present, Swallowing change: absent    Urinary frequency: present, Urinary urgency: absent, Urinary incontinence: present Musculoskeletal Neck pain: absent, Back pain: absent, Stiffness: absent, Muscle pain: absent, Joint pain: absent, restless leg : present   Dermatological Hair loss: absent, Skin changes: absent   Neurological Confusion: absent, Trouble concentrating: absent, Seizures: absent;  Memory loss: absent, balance problem: absent, Dizziness: present, vertigo: absent, Weakness: absent, Numbness present, Tremor: absent, Spasm: absent, involuntary movement: present, Speech difficulty: absent, Headache: absent, Light sensitivity: absent   Psychiatric Anxiety: present, Depression  absent, drug abuse: absent, Hallucination: absent, mood disorder: absent, Suicidal ideations absent   Hematologic Abnormal bleeding: present, Anemia: absent, Lymph gland changes: absent Clotting disorder: absent     Past Medical History:   Diagnosis Date    Arthritis     Crespo's esophagus     Diabetes mellitus ([facility] [address]     Hypertension        Past Surgical History:   Procedure Laterality Date    [address]    upper disc    BACK SURGERY  2019    protruded disc    CHOLECYSTECTOMY      1988    COLONOSCOPY  05/2017    COLONOSCOPY  08/31/2021    Dr. Mimi Herman - normal colonoscopy    COLONOSCOPY N/A 8/31/2021    COLORECTAL CANCER SCREENING, NOT HIGH RISK performed by Julian Heredai MD at [facility]    ENDOSCOPY, COLON, DIAGNOSTIC  07/2019    GASTRIC FUNDOPLICATION  2028    HYSTERECTOMY (CERVIX STATUS UNKNOWN)  1998    still has ovaries    JOINT REPLACEMENT Left 2015    HIP    JOINT REPLACEMENT Right     HIP    KNEE ARTHROSCOPY Bilateral 2000    LUMBAR FUSION N/A 01/18/2019    HARDWARE REMOVAL L4-S1, L3-4 DECOMPRESSION AND FUSION EXTENSION TO L3 performed by Edith Hartley MD at [address] Right 06/09/2016    TOTAL KNEE ARTHROPLASTY Right 2002    TOTAL KNEE ARTHROPLASTY Left 2010    UPPER GASTROINTESTINAL ENDOSCOPY  05/2017    UPPER GASTROINTESTINAL ENDOSCOPY  08/31/2021 Dr. Yamilet Morales - biopsies    UPPER GASTROINTESTINAL ENDOSCOPY N/A 8/31/2021    EGD BIOPSY performed by Lindsay Phan MD at [facility] 2006    LEG       Social History     Socioeconomic History    Marital status:      Spouse name: Not on file    Number of children: Not on file    Years of education: Not on file    Highest education level: Not on file   Occupational History    Not on file   Tobacco Use    Smoking status: Every Day     Packs/day: 1.00     Years: 50.00     Pack years: 50.00     Types: Cigarettes    Smokeless tobacco: Never   Vaping Use    Vaping Use: Not on file   Substance and Sexual Activity    Alcohol use: Yes     Comment: RARELY    Drug use: No    Sexual activity: Not on file   Other Topics Concern    Not on file   Social History Narrative    Not on file     Social Determinants of Health     Financial Resource Strain: Low Risk     Difficulty of Paying Living Expenses: Not hard at all   Food Insecurity: No Food Insecurity    Worried About Running Out of Food in the Last Year: Never true    Ran Out of Food in the Last Year: Never true   Transportation Needs: Not on file   Physical Activity: Not on file   Stress: Not on file   Social Connections: Not on file   Intimate Partner Violence: Not on file   Housing Stability: Not on file       Family History   Problem Relation Age of Onset    Diabetes Mother     Heart Disease Mother     Cancer Father     Diabetes Brother     Cancer Brother     High Blood Pressure Sister        Current Outpatient Medications   Medication Sig Dispense Refill    meloxicam (MOBIC) 15 MG tablet Take 1 tablet by mouth daily as needed for Pain 90 tablet 0    blood glucose monitor strips Brand per patient preference. Test 1 times a day & as needed for symptoms of irregular blood glucose. Dispense sufficient amount for indicated testing frequency plus additional to accommodate PRN testing needs.  100 strip 1    alendronate (FOSAMAX) 70 MG tablet TAKE 1 TABLET EVERY 7 DAYS ON THURSDAYS 8 tablet 3    gabapentin (NEURONTIN) 800 MG tablet Take 1 tablet by mouth 2 times daily for 90 days. 180 tablet 3    metoprolol tartrate (LOPRESSOR) 50 MG tablet TAKE 1 TABLET TWICE A  tablet 3    atorvastatin (LIPITOR) 10 MG tablet Take 1 tablet by mouth daily 90 tablet 3    metFORMIN (GLUCOPHAGE) 500 MG tablet Take 1 tablet by mouth 2 times daily (with meals) 180 tablet 3    sertraline (ZOLOFT) 50 MG tablet Take 1 tablet by mouth daily 90 tablet 3    vitamin B-12 (CYANOCOBALAMIN) 100 MCG tablet 1,000 mcg       Calcium Carbonate-Vitamin D (OYSTER SHELL CALCIUM/D) 500-200 MG-UNIT TABS Take 1 tablet by mouth 2 times daily (with meals)      aspirin 81 MG EC tablet Take 81 mg by mouth daily      albuterol sulfate HFA (VENTOLIN HFA) 108 (90 Base) MCG/ACT inhaler Inhale 2 puffs into the lungs 4 times daily as needed for Wheezing (Patient not taking: Reported on 10/17/2022) 1 each 2     No current facility-administered medications for this visit. DATA:  Lab Results   Component Value Date    WBC 4.9 01/19/2022    HGB 14.0 01/19/2022     01/19/2022    CHOL 115 03/25/2021    TRIG 54 03/25/2021    HDL 55 03/25/2021    ALT 11 01/19/2022    AST 12 01/19/2022     01/19/2022    K 4.3 01/19/2022     01/19/2022    CREATININE 0.68 01/19/2022    BUN 29 (H) 01/19/2022    CO2 27 01/19/2022    INR 0.93 01/03/2019    LABA1C 6.5 (H) 01/19/2022    LABMICR 11 03/25/2021       BP (!) 167/83 (Site: Right Upper Arm, Position: Sitting, Cuff Size: Medium Adult)   Pulse 66   Temp 97.3 °F (36.3 °C) (Temporal)   Resp 18   Ht 5' 10\" (1.778 m)   Wt 185 lb 3.2 oz (84 kg)   BMI 26.57 kg/m²     Blood pressure supine: 159/74; standing upright 141/71 mmHg    NEUROLOGICAL EXAMINATION:     MENTAL STATUS:.  Normal.    CRANIAL NERVES: Pupils are equal and reactive. EOMS are complete. .No abnormal eye movements. Facial sensation is normal. No facial weakness.   Hearing is normal. Palate and tongue movements are normal.  Shoulder shrug is symmetrical.    MOTOR EXAMINATION: Muscle tone is normal in all the limbs. There is no focal muscle wasting. Strength is 5/5 in both upper and lower limbs. No abnormal limb movements. SENSORY EXAMINATION: Normal.     STRETCH REFLEXES: Symmetrically diminished in both the upper and lower limbs. GAIT:.  No ataxia. Romberg sign is negative. IMPRESSION:  In summary, the patient is a 60-year-old lady history of diabetes, diabetic neuropathy, hypertension, hyperlipidemia, osteoarthritis, lumbar degenerative disc disease, with a history of having had an  episode of brief loss of consciousness, and jerking movements about 6 weeks ago. Clinically it appears that she probably had a syncopal episode with orthostatic hypotension due to dehydration, rather than a seizure. PLAN:    1. Increase fluid intake. 2.  Follow-up in the office as needed      NOTE: This neurology evaluation is part of outpatient coverage at [facility]  1-2 days per week. Patients requiring frequent evaluations or uncomfortable with potential 3-4 day turnaround on questions or calls  may be better served by a neurologist in the area full time. [facility] at [address] [place] [address]. Chrissie/Bernabe is available for outpatient visits and procedures including EMG/NCS. Non-[facility] neurologists also practice in [facility] (Dr. Toña Addison) and [facility] (Kelsi Otto).        Ingrid Tran MD   10/17/2022  12:17 PM

## 2022-10-17 NOTE — PATIENT INSTRUCTIONS
SURVEY:    You may be receiving a survey from [employer] regarding your visit today. Please complete the survey to enable us to provide the highest quality of care to you and your family. If you cannot score us a very good on any question, please call the office to discuss how we could have made your experience a very good one. Thank you.

## 2022-11-08 ENCOUNTER — OFFICE VISIT (OUTPATIENT)
Dept: CARDIOLOGY | Age: 72
End: 2022-11-08
Payer: COMMERCIAL

## 2022-11-08 VITALS
BODY MASS INDEX: 26.77 KG/M2 | DIASTOLIC BLOOD PRESSURE: 74 MMHG | HEIGHT: 70 IN | SYSTOLIC BLOOD PRESSURE: 125 MMHG | RESPIRATION RATE: 16 BRPM | OXYGEN SATURATION: 98 % | HEART RATE: 72 BPM | WEIGHT: 187 LBS

## 2022-11-08 DIAGNOSIS — R94.31 PROLONGED Q-T INTERVAL ON ECG: ICD-10-CM

## 2022-11-08 DIAGNOSIS — R94.31 ABNORMAL ECG: ICD-10-CM

## 2022-11-08 DIAGNOSIS — Z71.6 TOBACCO ABUSE COUNSELING: ICD-10-CM

## 2022-11-08 DIAGNOSIS — R55 SYNCOPE, UNSPECIFIED SYNCOPE TYPE: ICD-10-CM

## 2022-11-08 DIAGNOSIS — I42.2 HYPERTROPHIC CARDIOMYOPATHY (HCC): Primary | ICD-10-CM

## 2022-11-08 DIAGNOSIS — I10 ESSENTIAL HYPERTENSION: ICD-10-CM

## 2022-11-08 DIAGNOSIS — E78.2 MIXED HYPERLIPIDEMIA: ICD-10-CM

## 2022-11-08 DIAGNOSIS — G45.9 MINI STROKE: ICD-10-CM

## 2022-11-08 DIAGNOSIS — I63.9 CRYPTOGENIC STROKE (HCC): ICD-10-CM

## 2022-11-08 PROCEDURE — 93000 ELECTROCARDIOGRAM COMPLETE: CPT | Performed by: FAMILY MEDICINE

## 2022-11-08 PROCEDURE — 1123F ACP DISCUSS/DSCN MKR DOCD: CPT | Performed by: FAMILY MEDICINE

## 2022-11-08 PROCEDURE — 3078F DIAST BP <80 MM HG: CPT | Performed by: FAMILY MEDICINE

## 2022-11-08 PROCEDURE — 99205 OFFICE O/P NEW HI 60 MIN: CPT | Performed by: FAMILY MEDICINE

## 2022-11-08 PROCEDURE — 3074F SYST BP LT 130 MM HG: CPT | Performed by: FAMILY MEDICINE

## 2022-11-08 NOTE — PROGRESS NOTES
Taylor Elkins RN am scribing for and in the presence of Zee Warren MD, MS, F.A.C.C..    Patient: Asim Bolton  : 1950  Date of Visit: 2022    REASON FOR VISIT / CONSULTATION: New Patient (Hx: moderate assymetric septal hypertrophy. Cardiac event monitor done and echo done. Patient reports that she seen Neymar Stewart and was having passing out epsiodes ( a total of three times). 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 Denies CP. SOB with exertion. Denies palpitations. I could walk 1-2 blocks without any issue)      History of Present Illness:        Dear MURRAY Shaw - CNP,    I had the pleasure of seeing  Asim Bolton in my office today. Ms. Inocencio Carpenter is a 67 y.o. female with a recent history of syncope. She is a smoker and has smoked for 50 years at about 0.5 pack per day. She does have a history of diabetes, mini strokes, and hypertension. She denies any known family cardiac history. Echocardiogram on 2022 showed an ejection fraction of 65%. Left ventricular wall thickness is mildly increased. Moderate assymetric septal hypertrophy. The left atrium is mildly dilated (29-33) with a left atrial volume index of 32 ml/m2. Mild mitral regurgitation. Evidence of moderate diastolic dysfunction. Cardiac event monitor done on 2022 showed baseline rhythm is sinus with average heart rate 61 bpm, ranging between 41 and 111 bpm. Atrial tachycardia:  44 episodes. Longest, 18 beats at average 120 bpm up to 144 bpm.  Fastest, 3 beats at average 125 bpm up to 133 bpm. PAC 1.19%. PVC 0.10%. EKG showed normal sinus rhythm with borderline QT prolonged. She says she had three episodes of passing out in her lifetime but says that the last episode occurred about four weeks ago.  Her second occurrence FUSION N/A 01/18/2019    HARDWARE REMOVAL L4-S1, L3-4 DECOMPRESSION AND FUSION EXTENSION TO L3 performed by Annmarie Sanchez MD at [address] [place] [address] Soni Right 06/09/2016    TOTAL KNEE ARTHROPLASTY Right 2002    TOTAL KNEE ARTHROPLASTY Left 2010    UPPER GASTROINTESTINAL ENDOSCOPY  05/2017    UPPER GASTROINTESTINAL ENDOSCOPY  08/31/2021    Dr. Jacy Alejandre - biopsies    UPPER GASTROINTESTINAL ENDOSCOPY N/A 8/31/2021    EGD BIOPSY performed by Crista Preciado MD at [facility] Bilateral 2006    LEG    Social History:  Social History     Tobacco Use    Smoking status: Every Day     Packs/day: 0.50     Years: 50.00     Pack years: 25.00     Types: Cigarettes    Smokeless tobacco: Never   Substance Use Topics    Alcohol use: Yes     Comment: RARELY    Drug use: No        CURRENT MEDICATIONS:        Outpatient Medications Marked as Taking for the 11/8/22 encounter (Office Visit) with Mihai Mata MD   Medication Sig Dispense Refill    meloxicam (MOBIC) 15 MG tablet Take 1 tablet by mouth daily as needed for Pain 90 tablet 0    albuterol sulfate HFA (VENTOLIN HFA) 108 (90 Base) MCG/ACT inhaler Inhale 2 puffs into the lungs 4 times daily as needed for Wheezing 1 each 2    blood glucose monitor strips Brand per patient preference. Test 1 times a day & as needed for symptoms of irregular blood glucose. Dispense sufficient amount for indicated testing frequency plus additional to accommodate PRN testing needs. 100 strip 1    alendronate (FOSAMAX) 70 MG tablet TAKE 1 TABLET EVERY 7 DAYS ON THURSDAYS 8 tablet 3    gabapentin (NEURONTIN) 800 MG tablet Take 1 tablet by mouth 2 times daily for 90 days.  180 tablet 3    metoprolol tartrate (LOPRESSOR) 50 MG tablet TAKE 1 TABLET TWICE A  tablet 3    atorvastatin (LIPITOR) 10 MG tablet Take 1 tablet by mouth daily 90 tablet 3    metFORMIN (GLUCOPHAGE) 500 MG tablet Take 1 tablet by mouth 2 times daily (with meals) 180 tablet 3    sertraline (ZOLOFT) 50 MG tablet Take 1 tablet by mouth daily 90 tablet 3    vitamin B-12 (CYANOCOBALAMIN) 100 MCG tablet 1,000 mcg       Calcium Carbonate-Vitamin D (OYSTER SHELL CALCIUM/D) 500-200 MG-UNIT TABS Take 1 tablet by mouth 2 times daily (with meals)      aspirin 81 MG EC tablet Take 81 mg by mouth daily         FAMILY HISTORY: family history includes Cancer in her brother and father; Diabetes in her brother and mother; Heart Disease in her mother; High Blood Pressure in her sister. Physical Examination:     /74 (Site: Left Upper Arm, Position: Standing, Cuff Size: Medium Adult)   Pulse 72   Resp 16   Ht 5' 9.5\" (1.765 m)   Wt 187 lb (84.8 kg)   SpO2 98%   BMI 27.22 kg/m²  Body mass index is 27.22 kg/m². Constitutional: She appeared oriented to person and place. She appears well-developed and well-nourished. In no acute distress. HEENT: Normocephalic and atraumatic. No JVD present. Carotid bruit is not present. No mass and no thyromegaly present. No lymphadenopathy noted. Cardiovascular: Normal rate, regular rhythm, normal heart sounds. Exam reveals no gallop and no friction rubs. No murmur was heard. No change in bilateral hand . Pulmonary/Chest: Effort normal . No respiratory distress. She has no wheezes, rhonchi or rales. Decreased lung sounds present. Abdominal: Soft, non-tender. She exhibits no organomegaly, mass or bruit. Extremities: None. No cyanosis or clubbing. 2+ radial and carotid pulses. Distal extremity pulses: 2+ bilaterally. Neurological: Alertness and orientation as per Constitutional exam. No evidence of gross cranial nerve deficit. Coordination appeared normal.   Skin: Skin is warm and dry. There is no rash or diaphoresis. Psychiatric: She has a normal mood and affect.  Her speech is normal and behavior is normal.      MOST RECENT LABS ON RECORD:   Lab Results   Component Value Date    WBC 4.9 01/19/2022    HGB 14.0 01/19/2022    HCT 45.2 01/19/2022     01/19/2022 CHOL 115 03/25/2021    TRIG 54 03/25/2021    HDL 55 03/25/2021    ALT 11 01/19/2022    AST 12 01/19/2022     01/19/2022    K 4.3 01/19/2022     01/19/2022    CREATININE 0.68 01/19/2022    BUN 29 (H) 01/19/2022    CO2 27 01/19/2022    INR 0.93 01/03/2019    LABA1C 6.5 (H) 01/19/2022    LABMICR 11 03/25/2021       ASSESSMENT:     1. Hypertrophic cardiomyopathy ([address]    2. Abnormal ECG    3. Syncope, unspecified syncope type    4. Mini stroke    5. Mixed hyperlipidemia    6. Essential hypertension    7. Tobacco abuse counseling    8. Prolonged Q-T interval on ECG    9. Cryptogenic stroke (HCC)         PLAN:        Mild Abnormal ECG: Borderline QT prolonged and given her history of recurrent syncope I am a bit concerned about the possibility of an arrythmia  Additional Testing List: I ordered a treadmill stress test WITHOUT imaging to try and rule out this possibility. Loop recorder as above    Syncope/near syncope of unknown etiology: In the setting of Phenotypic Hypertrophic cardiomyopathy. Orthostatic BP readings today were Negative   Pharmacologic Therapy: Not indicated at this time. Nonpharmacologic counseling: Because of her condition, I reminded her to try and keep herself well-hydrated and to take extra time when moving from laying to sitting, sitting to standing and standing to walking. I also explained to her to help improve her symptoms she should include 3 g sodium diet, 1 or 2 L of sports drinks daily, knee-high compressions stockings. Additional Testing List: I ordered a treadmill stress test WITHOUT imaging to try and rule out this possibility. LINQ Referral: We discussed multiple testing and treatment options including a watch and wait approach, starting medication such as a beta blocker or calcium channel blocker in order to potentially decrease frequency and/or severity of symptoms, doing a 30 day event monitor, or the possibility of placing a v2tel LINQ loop recorder.   After discussing the risks and benefits of the different options, Ms. Turner Montez said she would prefer to go ahead with getting the [facility] loop recorder. Therefore I will have my office schedule this for her. Hypertrophic Cardiomyopathy  with 2 recent episodes of unexplained syncope. Beta Blocker: Continue Metoprolol tartrate (Lopressor) 50 mg bid. ACE Inibitor/ARB: Not indicated at this time. Diuretics: Not indicated at this time. Additional Testing List: I ordered a treadmill stress test WITHOUT imaging to try and rule out this possibility. I will plan to repeat her echocardiogram yearly to continue to monitor this condition. Loop recorder as above to monitor for NSVT as a source of her syncope    History of Possible Mini Strokes  Antiplatelet Agent: Continue Aspirin 81 mg daily. Loop recorder as above to look for cryptogenc stroke   Because of the [facility] trial which showed that using Lipitor in patients following a stroke lead to a 25% decrease in the risk of future strokes as appose to people who were not put on a statin. Continue Atorvastatin. Cholesterol Reduction Therapy: Continue Atorvastatin (Lipitor) 10 mg once daily      Hyperlipidemia: Mixed LDL: 49 on 3/25/2021  Cholesterol Reduction Therapy: Continue Atorvastatin (Lipitor) 10 mg once daily    I ordered lipid panel to assess LDL. Essential Hypertension: Controlled  Beta Blocker: Continue Metoprolol tartrate (Lopressor) 50 mg bid. ACE Inibitor/ARB: Not indicated at this time. Calcium Channel Blocker: Not indicated at this time. Diuretics: Not indicated at this time. Tobacco Abuse Counseling:  I spent about 5 minutes discussing the dangers of tobacco abuse as well as multiple methods for trying to quit smoking. In the end, Ms. Turner Montez said that she did not want any help at the current time, but would try to quit smoking on her own with the use of e-cigarettes, which certainly are not ideal but likely far less dangerous that tobacco. Finally, I recommended that she continue her other medications and follow up with you as previously scheduled. FOLLOW UP:   I told Ms. Bales to call my office if she had any problems, but otherwise I asked her to Return in about 6 weeks (around 12/20/2022). However, I would be happy to see her sooner should the need arise. Sincerely,  Jeremi Peralta MD, MS, F.A.C.C. [facility] Cardiology Specialist    [address] [place] [address]  Phone: [phone], Fax: [phone]     I believe that the risk of significant morbidity and mortality related to the patient's current medical conditions are: intermediate-high. >60 minutes were spent during prep work, discussion and exam of the patient, and follow up documentation and all of their questions were answered. The documentation recorded by the scribe, accurately and completely reflects the services I personally performed and the decisions made by me. Jeremi Peralta MD, MS, F.A.C.C.  November 8, 2022

## 2022-11-08 NOTE — PATIENT INSTRUCTIONS
SURVEY:    You may be receiving a survey from [employer] regarding your visit today. Please complete the survey to enable us to provide the highest quality of care to you and your family. If you cannot score us a very good on any question, please call the office to discuss how we could have made your experience a very good one. Thank you.

## 2022-11-15 ENCOUNTER — HOSPITAL ENCOUNTER (OUTPATIENT)
Age: 72
Discharge: HOME | End: 2022-11-15
Payer: COMMERCIAL

## 2022-11-15 ENCOUNTER — HOSPITAL ENCOUNTER (OUTPATIENT)
Dept: NON INVASIVE DIAGNOSTICS | Age: 72
Discharge: HOME | End: 2022-11-15
Payer: COMMERCIAL

## 2022-11-15 DIAGNOSIS — G45.9 MINI STROKE: ICD-10-CM

## 2022-11-15 DIAGNOSIS — Z71.6 TOBACCO ABUSE COUNSELING: ICD-10-CM

## 2022-11-15 DIAGNOSIS — I42.2 HYPERTROPHIC CARDIOMYOPATHY (HCC): ICD-10-CM

## 2022-11-15 DIAGNOSIS — E78.2 MIXED HYPERLIPIDEMIA: ICD-10-CM

## 2022-11-15 DIAGNOSIS — R94.31 ABNORMAL ECG: ICD-10-CM

## 2022-11-15 DIAGNOSIS — I10 ESSENTIAL HYPERTENSION: ICD-10-CM

## 2022-11-15 DIAGNOSIS — R55 SYNCOPE, UNSPECIFIED SYNCOPE TYPE: ICD-10-CM

## 2022-11-15 LAB
CHOLESTEROL/HDL RATIO: 2.3
CHOLESTEROL: 130 MG/DL
HDLC SERPL-MCNC: 56 MG/DL
LDL CHOLESTEROL: 60 MG/DL (ref 0–130)
TRIGL SERPL-MCNC: 70 MG/DL

## 2022-11-15 PROCEDURE — 93017 CV STRESS TEST TRACING ONLY: CPT

## 2022-11-15 PROCEDURE — 80061 LIPID PANEL: CPT

## 2022-11-15 PROCEDURE — 36415 COLL VENOUS BLD VENIPUNCTURE: CPT

## 2022-11-15 NOTE — PROCEDURES
horizontal  ST segment changes in leads II, III, AVF, V5, V6 which did not meet  diagnostic criteria for myocardial ischemia with no premature atrial  contractions (PACs) and no premature ventricular contractions (PVCs). IMPRESSION:  No significant electrocardiographic evidence of myocardial ischemia  during EKG monitoring without significant associated arrhythmias. The patient's Duke Treadmill score is 2 which correlates with an  intermediate risk significant coronary artery disease. Overall these results are most consistent with an intermediate risk for  significant coronary artery disease. Based on the patient's abnormal exercise stress test results, a repeat  study with the addition of cardiac imaging is recommended.         Chichi Mcmullen MD    D: 11/15/2022 14:57:40       T: 11/15/2022 15:59:28     KISHORE/YARITZA_EDIT  Job#: [id number]     Doc#: Unknown    CC:  MURRAY Martínez-MARC

## 2022-11-17 ENCOUNTER — HOSPITAL ENCOUNTER (OUTPATIENT)
Dept: CARDIAC CATH/INVASIVE PROCEDURES | Age: 72
Discharge: HOME | End: 2022-11-17
Attending: FAMILY MEDICINE | Admitting: FAMILY MEDICINE
Payer: COMMERCIAL

## 2022-11-17 ENCOUNTER — TELEPHONE (OUTPATIENT)
Dept: CARDIOLOGY | Age: 72
End: 2022-11-17

## 2022-11-17 VITALS
WEIGHT: 187 LBS | BODY MASS INDEX: 26.77 KG/M2 | HEIGHT: 70 IN | OXYGEN SATURATION: 95 % | RESPIRATION RATE: 16 BRPM | DIASTOLIC BLOOD PRESSURE: 65 MMHG | SYSTOLIC BLOOD PRESSURE: 151 MMHG | HEART RATE: 48 BPM | TEMPERATURE: 98.2 F

## 2022-11-17 DIAGNOSIS — R94.31 ABNORMAL ECG: ICD-10-CM

## 2022-11-17 DIAGNOSIS — G45.9 MINI STROKE: ICD-10-CM

## 2022-11-17 DIAGNOSIS — I42.2 HYPERTROPHIC CARDIOMYOPATHY (HCC): Primary | ICD-10-CM

## 2022-11-17 PROCEDURE — 2500000003 HC RX 250 WO HCPCS

## 2022-11-17 PROCEDURE — C1764 EVENT RECORDER, CARDIAC: HCPCS

## 2022-11-17 PROCEDURE — 33285 INSJ SUBQ CAR RHYTHM MNTR: CPT | Performed by: FAMILY MEDICINE

## 2022-11-17 PROCEDURE — 2709999900 HC NON-CHARGEABLE SUPPLY

## 2022-11-17 PROCEDURE — 33285 INSJ SUBQ CAR RHYTHM MNTR: CPT

## 2022-11-17 RX ORDER — SODIUM CHLORIDE 0.9 % (FLUSH) 0.9 %
5-40 SYRINGE (ML) INJECTION EVERY 12 HOURS SCHEDULED
Status: DISCONTINUED | OUTPATIENT
Start: 2022-11-17 | End: 2022-11-17 | Stop reason: HOSPADM

## 2022-11-17 RX ORDER — SODIUM CHLORIDE 9 MG/ML
INJECTION, SOLUTION INTRAVENOUS PRN
Status: DISCONTINUED | OUTPATIENT
Start: 2022-11-17 | End: 2022-11-17 | Stop reason: HOSPADM

## 2022-11-17 RX ORDER — SODIUM CHLORIDE 0.9 % (FLUSH) 0.9 %
5-40 SYRINGE (ML) INJECTION PRN
Status: DISCONTINUED | OUTPATIENT
Start: 2022-11-17 | End: 2022-11-17 | Stop reason: HOSPADM

## 2022-11-17 NOTE — TELEPHONE ENCOUNTER
----- Message from Eloy Fuller MD sent at 11/17/2022 12:45 AM EST -----  Please let Ms. Bales know that her treadmill stress test was inconclusive so we need to order a stress test with cardiac imaging. Please order Rosetta Stress test if patient agreeable. Thanks.

## 2022-11-17 NOTE — DISCHARGE INSTRUCTIONS
DISCHARGE INSTRUCTIONS FOR  loop recorder    HOME CARE:    You will likely go home with steri strips/surgical glue over your incision. Surgical dressing may be removed in 24 hours and changed daily until no   drainage is noted form site. Keep the incision clean and dry. If used, steri strips will fall off within 1-2 weeks. Do not take shower until 24 hours or until no further drainage from site. Tylenol may be used for relief of tenderness around the implant site. Also avoid anything that will rub against the incision. Normally the device may seem to bulge slightly under your skin. The bulge may be more prominent right after surgery but will become less noticeable the next two weeks. CHANGES TO NOTIFY OUR OFFICE ABOUT:   Increased swelling and/or tenderness. Increased redness of the pocket or incision. Drainage from the incision. A pimple that develops along the incision. You develop a fever and do not have a cold or the flu. Notify the doctor if you experience the symptoms you had prior to implant. Avoid activities that may result in high impact or stress at the incision site. DRIVING IS USUALLY PERMITTED WITHIN 24 hours. IF YOU ARE ON BLOOD THINNERS, CHECK WITH YOUR DOCTOR WHEN TO RESUME YOUR MEDICATION. CARRY DEVICE I.D. AND HOME MEDICATION LIST WITH YOU AT ALL TIMES. NOTIFY ANY MEDICAL PERSONNEL THAT YOU HAVE A DEVICE BEFORE ANY PROCEDURES. THESE ARE GENERAL GUIDELINES AFTER IMPLANTATION. IF THERE ARE ANY EXCEPTIONS TO THESE INSTRUCTIONS, THEY WILL BE REVIEWED WITH YOU ON AN INDIVIDUAL BASIS.

## 2022-11-17 NOTE — PROGRESS NOTES
Patient returned to pre/post area. Alert and oriented, denies pain. Left chest padded tegaderm clean/dry/intact.

## 2022-11-17 NOTE — OP NOTE
OPERATIVE REPORT    Patient: Renee Mcconnell   YOB: 1950       Attending: Karmen Shepherd M.D. Date of Surgery: 11/17/2022   PCP [facility],[address], APRN - [facility]    SURGEON:  Karmen Shepherd M.D. PROCEDURE:  Medtronic LINQ loop recorder placement    INDICATION/PRE-OP DIAGNOSIS:  Recurrent syncope, Long QT syndrome, hypertrophic cardiomyopathy      POST-OP DIAGNOSIS: Same as pre-op Diagnosis    NARRATIVE SUMMARY:  Ms. Dylan Abraham was brought to the pre-operative waiting room and after explaining the risks, benefits and alternatives of the procedure and informed written consent was obtained. The patient was prepped and draped in the standard surgical fashion. After adequate sedation Marcaine was used to anesthetize the area over the planned loop recorder placement. An incision was made and once an adequate sized pocket was formed using a dilator. The device was placed subcutaneously in a 10 and 4 O'clock position with the device lettering facing up. Finally, the pocket was closed with demabond. Overall the patient tolerated the procedure well and there were no complications. Blood loss was minimal.    IMPLANTED DEVICE DETAIL:  Medtronic REVEAL LINQ loop recorder, [id number], serial number [id number]. Estimated Blood Loss: Less than 2 ml. Ravindra Hyatt MD, MS, F.A.C.C.   [facility]  [address] [place] [address]  Phone: [phone], Fax: [phone]     Electronically signed by Chau Harden MD on 11/17/2022 at 11:41 AM Patient aware of mammogram results and recommendations for 6 months follow up. Order placed.  Patient verbalizes understanding

## 2022-11-30 ENCOUNTER — NURSE ONLY (OUTPATIENT)
Dept: CARDIOLOGY | Age: 72
End: 2022-11-30
Payer: COMMERCIAL

## 2022-11-30 DIAGNOSIS — Z51.89 VISIT FOR WOUND CHECK: Primary | ICD-10-CM

## 2022-11-30 PROCEDURE — 33285 INSJ SUBQ CAR RHYTHM MNTR: CPT | Performed by: FAMILY MEDICINE

## 2022-11-30 NOTE — PROGRESS NOTES
The wound is cleansed, debrided of foreign material as much as possible, and dressed. The patient is alerted to watch for any signs of infection (redness, pus, pain, increased swelling or fever) and call if such occurs. Home wound care instructions are provided.

## 2022-12-20 ENCOUNTER — OFFICE VISIT (OUTPATIENT)
Dept: CARDIOLOGY | Age: 72
End: 2022-12-20
Payer: COMMERCIAL

## 2022-12-20 VITALS
WEIGHT: 186 LBS | HEIGHT: 70 IN | HEART RATE: 53 BPM | OXYGEN SATURATION: 97 % | SYSTOLIC BLOOD PRESSURE: 141 MMHG | BODY MASS INDEX: 26.63 KG/M2 | RESPIRATION RATE: 16 BRPM | DIASTOLIC BLOOD PRESSURE: 76 MMHG

## 2022-12-20 DIAGNOSIS — R07.89 ATYPICAL CHEST PAIN: Primary | ICD-10-CM

## 2022-12-20 DIAGNOSIS — I10 ESSENTIAL HYPERTENSION: ICD-10-CM

## 2022-12-20 DIAGNOSIS — R94.39 ABNORMAL STRESS TEST: ICD-10-CM

## 2022-12-20 DIAGNOSIS — R55 SYNCOPE, UNSPECIFIED SYNCOPE TYPE: ICD-10-CM

## 2022-12-20 DIAGNOSIS — I42.2 HYPERTROPHIC CARDIOMYOPATHY (HCC): ICD-10-CM

## 2022-12-20 PROCEDURE — 1123F ACP DISCUSS/DSCN MKR DOCD: CPT | Performed by: FAMILY MEDICINE

## 2022-12-20 PROCEDURE — 3074F SYST BP LT 130 MM HG: CPT | Performed by: FAMILY MEDICINE

## 2022-12-20 PROCEDURE — 93298 REM INTERROG DEV EVAL SCRMS: CPT | Performed by: FAMILY MEDICINE

## 2022-12-20 PROCEDURE — 99214 OFFICE O/P EST MOD 30 MIN: CPT | Performed by: FAMILY MEDICINE

## 2022-12-20 PROCEDURE — 3078F DIAST BP <80 MM HG: CPT | Performed by: FAMILY MEDICINE

## 2022-12-20 NOTE — PATIENT INSTRUCTIONS
SURVEY:    You may be receiving a survey from [employer] regarding your visit today. Please complete the survey to enable us to provide the highest quality of care to you and your family. If you cannot score us a very good on any question, please call the office to discuss how we could have made your experience a very good one. Thank you.

## 2022-12-20 NOTE — PROGRESS NOTES
Carlos De La Paz RN am scribing for and in the presence of Tayler Goss MD, MS, F.A.C.C..    Patient: Demond Harding  : 1950  Date of Visit: 2022    REASON FOR VISIT / CONSULTATION: Follow-up (Hx; hypertrophic cardiomyopathy, syncope. LINQ loop recorder implanted. Denies cp, lightheadedness, palpitations, sob. Did have an episode heaviness pressure in chest and hit button (two weeks ago) lasted about 10 mins. Then had one other episode of almost passing out (duration of )      History of Present Illness:        Dear Libby Valentine, APRN - CNP,    I had the pleasure of seeing  Demond Harding in my office today. Ms. Ruy Alamo is a 67 y.o. female with a recent history of syncope. She is a smoker and has smoked for 50 years at about 0.5 pack per day. She does have a history of diabetes, mini strokes, and hypertension. She denies any known family cardiac history. Echocardiogram on 2022 showed an ejection fraction of 65%. Left ventricular wall thickness is mildly increased. Moderate assymetric septal hypertrophy. The left atrium is mildly dilated (29-33) with a left atrial volume index of 32 ml/m2. Mild mitral regurgitation. Evidence of moderate diastolic dysfunction. Cardiac event monitor done on 2022 showed baseline rhythm is sinus with average heart rate 61 bpm, ranging between 41 and 111 bpm. Atrial tachycardia:  44 episodes. Longest, 18 beats at average 120 bpm up to 144 bpm.  Fastest, 3 beats at average 125 bpm up to 133 bpm. PAC 1.19%. PVC 0.10%. EKG showed normal sinus rhythm with borderline QT prolonged. Abnormal Treadmill Stress Test: Delvalle score of 2-Intermediate risk on 11/15/2022. Loop recorder placement on 2022-no dangerous rhythm seen. Ms. Ruy Alamo reports that she did have a bout of chest discomfort for about 10 minutes a one or two weeks ago and says that this was at night time and has not had any since then. She said at this time she did hit her button.  She says she does not get heart burn normally but did have it years ago but since surgery was done for this she has not had any since then. She did have one episode of feeling like she was going to pass out since her loop recorder placement but says that this only lasted a couple of minutes and went away. She reports having a relatively good exercise tolerance and denied any current or recent chest pain, shortness of breath, abdominal pain, bleeding problems, problems with her medications or any other concerns at this time. Exercise Tolerance: Ms. Jose Choudhary reports that she has a fairly good exercise tolerance. Her says that she could walk 100 yards without developing chest discomfort or significant shortness of breath. However she noticed that since she is not working  She does notice that if she walks more than 100 yards she does get shortness of breath. PAST MEDICAL HISTORY:         Past Medical History:   Diagnosis Date    Arthritis     Crespo's esophagus     Diabetes mellitus ([facility] [address]     Hypertension      CURRENT ALLERGIES: Penicillins REVIEW OF SYSTEMS: 14 systems were reviewed. Pertinent positives and negatives as above, all else negative.      Past Surgical History:   Procedure Laterality Date    APPENDECTOMY  1969    BACK SURGERY  1994    upper disc    BACK SURGERY  2019    protruded disc    CHOLECYSTECTOMY      1988    COLONOSCOPY  05/2017    COLONOSCOPY  08/31/2021    Dr. Donny Draper - normal colonoscopy    COLONOSCOPY N/A 8/31/2021    COLORECTAL CANCER SCREENING, NOT HIGH RISK performed by Erna Alcantar MD at [address] [place] [address]    ENDOSCOPY, COLON, DIAGNOSTIC  07/2019    GASTRIC FUNDOPLICATION  6739    HYSTERECTOMY (CERVIX STATUS UNKNOWN)  1998    still has ovaries    JOINT REPLACEMENT Left 2015    HIP    JOINT REPLACEMENT Right     HIP    KNEE ARTHROSCOPY Bilateral 2000    LUMBAR FUSION N/A 01/18/2019    HARDWARE REMOVAL L4-S1, L3-4 DECOMPRESSION AND FUSION EXTENSION TO L3 performed by Myra Mortonr, MD at [address] [place] [address] Soni Right 06/09/2016    TOTAL KNEE ARTHROPLASTY Right 2002    TOTAL KNEE ARTHROPLASTY Left 2010    UPPER GASTROINTESTINAL ENDOSCOPY  05/2017    UPPER GASTROINTESTINAL ENDOSCOPY  08/31/2021    Dr. Ocasio Radha - biopsies    UPPER GASTROINTESTINAL ENDOSCOPY N/A 8/31/2021    EGD BIOPSY performed by Carmen Rod[facility]guez MD at [facility] Bilateral 2006    LEG    Social History:  Social History     Tobacco Use    Smoking status: Every Day     Packs/day: 0.50     Years: 50.00     Pack years: 25.00     Types: Cigarettes    Smokeless tobacco: Never   Substance Use Topics    Alcohol use: Yes     Comment: Occasionally    Drug use: No        CURRENT MEDICATIONS:        Outpatient Medications Marked as Taking for the 12/20/22 encounter (Office Visit) with Cynthia Hernandez MD   Medication Sig Dispense Refill    meloxicam (MOBIC) 15 MG tablet Take 1 tablet by mouth daily as needed for Pain 90 tablet 0    albuterol sulfate HFA (VENTOLIN HFA) 108 (90 Base) MCG/ACT inhaler Inhale 2 puffs into the lungs 4 times daily as needed for Wheezing 1 each 2    blood glucose monitor strips Brand per patient preference. Test 1 times a day & as needed for symptoms of irregular blood glucose. Dispense sufficient amount for indicated testing frequency plus additional to accommodate PRN testing needs. 100 strip 1    alendronate (FOSAMAX) 70 MG tablet TAKE 1 TABLET EVERY 7 DAYS ON THURSDAYS 8 tablet 3    gabapentin (NEURONTIN) 800 MG tablet Take 1 tablet by mouth 2 times daily for 90 days.  180 tablet 3    metoprolol tartrate (LOPRESSOR) 50 MG tablet TAKE 1 TABLET TWICE A  tablet 3    atorvastatin (LIPITOR) 10 MG tablet Take 1 tablet by mouth daily 90 tablet 3    metFORMIN (GLUCOPHAGE) 500 MG tablet Take 1 tablet by mouth 2 times daily (with meals) 180 tablet 3    sertraline (ZOLOFT) 50 MG tablet Take 1 tablet by mouth daily 90 tablet 3    vitamin B-12 (CYANOCOBALAMIN) 100 MCG tablet 1,000 mcg       Calcium Carbonate-Vitamin D (OYSTER SHELL CALCIUM/D) 500-200 MG-UNIT TABS Take 1 tablet by mouth 2 times daily (with meals)      aspirin 81 MG EC tablet Take 81 mg by mouth daily         FAMILY HISTORY: family history includes Cancer in her brother and father; Diabetes in her brother and mother; Heart Disease in her mother; High Blood Pressure in her sister. Physical Examination:     BP (!) 141/76 (Site: Right Upper Arm, Position: Sitting, Cuff Size: Medium Adult)   Pulse 53   Resp 16   Ht 5' 9.5\" (1.765 m)   Wt 186 lb (84.4 kg)   SpO2 97%   BMI 27.07 kg/m²  Body mass index is 27.07 kg/m². Constitutional: She appeared oriented to person and place. She appears well-developed and well-nourished. In no acute distress. HEENT: Normocephalic and atraumatic. No JVD present. Carotid bruit is not present. No mass and no thyromegaly present. No lymphadenopathy noted. Cardiovascular: Normal rate, regular rhythm, normal heart sounds. Exam reveals no gallop and no friction rubs. No murmur was heard. No change in bilateral hand . Pulmonary/Chest: Effort normal . No respiratory distress. She has no wheezes, rhonchi or rales. Decreased lung sounds present. Abdominal: Soft, non-tender. She exhibits no organomegaly, mass or bruit. Extremities: None. No cyanosis or clubbing. 2+ radial and carotid pulses. Distal extremity pulses: 2+ bilaterally. Neurological: Alertness and orientation as per Constitutional exam. No evidence of gross cranial nerve deficit. Coordination appeared normal.   Skin: Skin is warm and dry. There is no rash or diaphoresis. Psychiatric: She has a normal mood and affect.  Her speech is normal and behavior is normal.      MOST RECENT LABS ON RECORD:   Lab Results   Component Value Date    WBC 4.9 01/19/2022    HGB 14.0 01/19/2022    HCT 45.2 01/19/2022     01/19/2022    CHOL 130 11/15/2022    TRIG 70 11/15/2022    HDL 56 11/15/2022    ALT 11 01/19/2022    AST 12 01/19/2022     01/19/2022    K 4.3 01/19/2022     01/19/2022    CREATININE 0.68 01/19/2022    BUN 29 (H) 01/19/2022    CO2 27 01/19/2022    INR 0.93 01/03/2019    LABA1C 6.5 (H) 01/19/2022    LABMICR 11 03/25/2021       ASSESSMENT:     1. Atypical chest pain    2. Abnormal stress test    3. Syncope, unspecified syncope type    4. Hypertrophic cardiomyopathy ([address]    5. Essential hypertension      PLAN:        Abnormal Treadmill Stress Test: Delvalle score of 2-Intermediate risk and Atypical Chest Pain: Mild Abnormal ECG: Borderline QT prolonged and given her history of recurrent syncope I am a bit concerned about the possibility of an arrythmia  Additional Testing List: Because current signs and symptoms can certainly be caused by significant coronary artery disease, I ordered a Regadenoson (Lexiscan) stress test with SPECT imaging to try and rule out this possibility. Syncope/near syncope of unknown etiology: In the setting of Phenotypic Hypertrophic cardiomyopathy. LINQ Showed no evidence of dangerous rhythms-will continue to monitor. However I did ask that she hit her button today to ensure that we receive her activated patient symptoms, since she has hit her button before and this never recorded, I would like to make sure the button is working properly. She was in agreement with this. Pharmacologic Therapy: Not indicated at this time. Nonpharmacologic counseling: Because of her condition, I reminded her to try and keep herself well-hydrated and to take extra time when moving from laying to sitting, sitting to standing and standing to walking. I also explained to her to help improve her symptoms she should include 3 g sodium diet, 1 or 2 L of sports drinks daily, knee-high compressions stockings. Hypertrophic Cardiomyopathy  with 2 recent episodes of unexplained syncope. Beta Blocker: Continue Metoprolol tartrate (Lopressor) 50 mg bid.    ACE Inibitor/ARB: Not indicated at this time.  Diuretics: Not indicated at this time. Additional Testing List: Because current signs and symptoms can certainly be caused by significant coronary artery disease, I ordered a Regadenoson (Lexiscan) stress test with SPECT imaging to try and rule out this possibility. History of Possible Mini Strokes  Antiplatelet Agent: Continue Aspirin 81 mg daily. Loop recorder as above to look for cryptogenc stroke   Because of the [facility] trial which showed that using Lipitor in patients following a stroke lead to a 25% decrease in the risk of future strokes as appose to people who were not put on a statin. Continue Atorvastatin. Cholesterol Reduction Therapy: Continue Atorvastatin (Lipitor) 10 mg once daily      Hyperlipidemia: Mixed LDL: 60 on 11/15/2022  Cholesterol Reduction Therapy: Continue Atorvastatin (Lipitor) 10 mg once daily      Essential Hypertension: Controlled  Beta Blocker: Continue Metoprolol tartrate (Lopressor) 50 mg bid. ACE Inibitor/ARB: Not indicated at this time. Calcium Channel Blocker: Not indicated at this time. Diuretics: Not indicated at this time. Tobacco Abuse Counseling:  I spent about 5 minutes discussing the dangers of tobacco abuse as well as multiple methods for trying to quit smoking. In the end, Ms. Adarsh Pineda said that she did not want any help at the current time, but would try to quit smoking on her own with the use of e-cigarettes, which certainly are not ideal but likely far less dangerous that tobacco.     Finally, I recommended that she continue her other medications and follow up with you as previously scheduled. FOLLOW UP:   I told Ms. Bales to call my office if she had any problems, but otherwise I asked her to Return in about 6 weeks (around 1/31/2023). However, I would be happy to see her sooner should the need arise. Sincerely,  Meño Millan MD, MS, F.A.C.C.   [facility]    [address], [place], [address] [place] [address]  Phone: [phone], Fax: [phone]     I believe that the risk of significant morbidity and mortality related to the patient's current medical conditions are: Intermediate. The documentation recorded by the scribe, accurately and completely reflects the services I personally performed and the decisions made by me. Bonny Mathew MD, MS, F.A.C.C.  December 20, 2022

## 2022-12-21 ENCOUNTER — NURSE ONLY (OUTPATIENT)
Dept: CARDIOLOGY | Age: 72
End: 2022-12-21
Payer: COMMERCIAL

## 2022-12-21 DIAGNOSIS — Z45.09 ENCOUNTER FOR LOOP RECORDER CHECK: ICD-10-CM

## 2022-12-21 DIAGNOSIS — R55 SYNCOPE, UNSPECIFIED SYNCOPE TYPE: Primary | ICD-10-CM

## 2022-12-21 DIAGNOSIS — I63.9 CRYPTOGENIC STROKE (HCC): ICD-10-CM

## 2022-12-27 ENCOUNTER — HOSPITAL ENCOUNTER (OUTPATIENT)
Dept: NON INVASIVE DIAGNOSTICS | Age: 72
Discharge: HOME | End: 2022-12-27
Payer: COMMERCIAL

## 2022-12-27 DIAGNOSIS — I42.2 HYPERTROPHIC CARDIOMYOPATHY (HCC): ICD-10-CM

## 2022-12-27 DIAGNOSIS — R94.31 ABNORMAL ECG: ICD-10-CM

## 2022-12-27 DIAGNOSIS — G45.9 MINI STROKE: ICD-10-CM

## 2022-12-27 PROCEDURE — 6360000002 HC RX W HCPCS: Performed by: FAMILY MEDICINE

## 2022-12-27 PROCEDURE — 93017 CV STRESS TEST TRACING ONLY: CPT

## 2022-12-27 PROCEDURE — A9500 TC99M SESTAMIBI: HCPCS | Performed by: FAMILY MEDICINE

## 2022-12-27 PROCEDURE — 3430000000 HC RX DIAGNOSTIC RADIOPHARMACEUTICAL: Performed by: FAMILY MEDICINE

## 2022-12-27 RX ORDER — TECHNETIUM TC-99M SESTAMIBI 1 MG/10ML
30 INJECTION INTRAVENOUS
Status: COMPLETED | OUTPATIENT
Start: 2022-12-27 | End: 2022-12-27

## 2022-12-27 RX ADMIN — REGADENOSON 0.4 MG: 0.08 INJECTION, SOLUTION INTRAVENOUS at 11:19

## 2022-12-27 RX ADMIN — Medication 30 MILLICURIE: at 11:08

## 2022-12-28 ENCOUNTER — HOSPITAL ENCOUNTER (OUTPATIENT)
Dept: NON INVASIVE DIAGNOSTICS | Age: 72
Discharge: HOME | End: 2022-12-28
Payer: COMMERCIAL

## 2022-12-28 PROCEDURE — A9500 TC99M SESTAMIBI: HCPCS | Performed by: FAMILY MEDICINE

## 2022-12-28 PROCEDURE — 78452 HT MUSCLE IMAGE SPECT MULT: CPT

## 2022-12-28 PROCEDURE — 3430000000 HC RX DIAGNOSTIC RADIOPHARMACEUTICAL: Performed by: FAMILY MEDICINE

## 2022-12-28 RX ORDER — TECHNETIUM TC-99M SESTAMIBI 1 MG/10ML
30 INJECTION INTRAVENOUS
Status: COMPLETED | OUTPATIENT
Start: 2022-12-28 | End: 2022-12-28

## 2022-12-28 RX ADMIN — Medication 30 MILLICURIE: at 14:10

## 2022-12-29 ENCOUNTER — TELEPHONE (OUTPATIENT)
Dept: CARDIOLOGY | Age: 72
End: 2022-12-29

## 2022-12-29 RX ORDER — MELOXICAM 15 MG/1
TABLET ORAL
Qty: 30 TABLET | Refills: 0 | OUTPATIENT
Start: 2022-12-29

## 2022-12-29 NOTE — TELEPHONE ENCOUNTER
----- Message from Mihai Mata MD sent at 12/29/2022  1:32 PM EST -----  Let Ms. Bales know their test result was ok. Will discuss at next visit. Thanks.

## 2022-12-29 NOTE — PROCEDURES
[facility], [address] [place] [address]                              CARDIAC STRESS TEST    PATIENT NAME: Michel Amin                   :        1950  MED REC NO:   [id number]                              ROOM:  ACCOUNT NO:   [de-identified]                           ADMIT DATE: 2022  PROVIDER:     Aileen Kim MD    CARDIOVASCULAR DIAGNOSTIC DEPARTMENT    DATE OF STUDY:  2022    ORDERING PROVIDER:  Pavithra Tang MD    PRIMARY CARE PROVIDER:  Rocio Green CNP    INTERPRETING PHYSICIAN:  Taylor Robertson. Laila Gordon MD    PHARMACOLOGIC MYOCARDIAL PERFUSION STRESS TESTING    Stress/Rest single isotope SPECT imaging with exercise stress and gated  SPECT imaging. INDICATIONS:  Assessment of recent chest pain and/or chest discomfort. CLINICAL HISTORY:  The patient is a 70-year-old woman with no known  coronary artery disease. Previous cardiac history includes:  Stress test.    Other previous history includes:  Chest pain, dyspnea, lightheadedness,  diabetes mellitus, arthritis, caffeine, hypertension. Symptoms just prior to testing include:  None. Relevant medications:  Metoprolol (Toprol). PROCEDURE:  The heart rate was 59 at baseline and analia to 83 beats per  minute during the regadenoson infusion. The rest blood pressure was  136/74 mm/Hg and increased to 172/88 mm/Hg. The patient did complain of  shortness of breath following infusion. Pharmacologic stress testing was performed with regadenoson at a dose of  0.4 mg. Additionally, low level exercise using slow treadmill walking  was performed along with vasodilator infusion. MYOCARDIAL PERFUSION IMAGING:  Imaging was performed at rest 30-45  minutes following the injection of 30 mCi of sestamibi. Approximately  10 seconds after Lexiscan injection, the patient was injected with 30  mCi of sestamibi.   Gating post-stress tomographic imaging was performed  30-45 minutes after stress. STRESS ECG RESULTS:  The resting electrocardiogram demonstrated normal  sinus rhythm without significant ST-segment abnormalities that may  impair accurate ECG detection of stress induced cardiac ischemia. During vasodilator infusion and during recovery, the patient developed:    No significant ST segment changes suggestive of myocardial ischemia with  no premature atrial contractions (PACs) and no premature ventricular  contractions (PVCs). NUCLEAR IMAGING RESULTS:  The overall quality of the study is fair. No  significant attenuation artifact was seen. There is no evidence of  abnormal lung uptake. Additionally, the right ventricle appears normal.  The left ventricular cavity is noted to be normal in size on the stress  images. There is no evidence of transient ischemic dilatation (TID) of  the left ventricle. Gated SPECT imaging reveals normal myocardial thickening and wall motion  with a calculated left ventricular ejection fraction of 68%. The rest images demonstrated a small perfusion abnormality of mild  intensity in the apical region which is most likely due to artifact. On stress imaging, a small perfusion abnormality of mild intensity in  the apical region which is most likely due to artifact. IMPRESSION:  1. Largely normal myocardial perfusion imaging with soft tissue  artifact, but without significant evidence of myocardial ischemia or  infarction. 2.  Global left ventricular systolic function was normal with an  ejection fraction of 68%, without regional wall motion abnormalities. 3.  No significant electrocardiographic evidence of myocardial ischemia  during EKG monitoring without significant associated arrhythmias. Overall, these results are most consistent with a low risk for  significant coronary artery disease.     Although the patient's results were not completely normal, unless  clinical suspicion for significant ongoing coronary artery ischemia is  high, I would not suggest pursuing additional testing by coronary  angiography. The sensitivity for detecting ischemia on this test may have been  reduced due to the patient being on a beta blocker.         Charline Doyle MD    D: 12/29/2022 9:01:08       T: 12/29/2022 9:02:48     [email or web address]  Job#: [id number]     Doc#: Unknown    CC:  MURRAY Garcia-MARC Hernandez MD

## 2023-01-17 PROCEDURE — 93298 REM INTERROG DEV EVAL SCRMS: CPT | Performed by: FAMILY MEDICINE

## 2023-01-18 ENCOUNTER — NURSE ONLY (OUTPATIENT)
Dept: CARDIOLOGY | Age: 73
End: 2023-01-18
Payer: COMMERCIAL

## 2023-01-18 DIAGNOSIS — R55 SYNCOPE, UNSPECIFIED SYNCOPE TYPE: Primary | ICD-10-CM

## 2023-01-18 DIAGNOSIS — Z45.09 ENCOUNTER FOR LOOP RECORDER CHECK: ICD-10-CM

## 2023-01-25 RX ORDER — GLUCOSAMINE HCL/CHONDROITIN SU 500-400 MG
CAPSULE ORAL
Qty: 300 STRIP | Refills: 3 | Status: SHIPPED | OUTPATIENT
Start: 2023-01-25

## 2023-02-03 ENCOUNTER — OFFICE VISIT (OUTPATIENT)
Dept: CARDIOLOGY | Age: 73
End: 2023-02-03

## 2023-02-03 VITALS
OXYGEN SATURATION: 96 % | RESPIRATION RATE: 18 BRPM | BODY MASS INDEX: 26.74 KG/M2 | WEIGHT: 186.8 LBS | HEART RATE: 52 BPM | SYSTOLIC BLOOD PRESSURE: 136 MMHG | DIASTOLIC BLOOD PRESSURE: 86 MMHG | HEIGHT: 70 IN

## 2023-02-03 DIAGNOSIS — Z45.09 ENCOUNTER FOR LOOP RECORDER CHECK: ICD-10-CM

## 2023-02-03 DIAGNOSIS — I10 ESSENTIAL HYPERTENSION: ICD-10-CM

## 2023-02-03 DIAGNOSIS — R55 SYNCOPE, UNSPECIFIED SYNCOPE TYPE: Primary | ICD-10-CM

## 2023-02-03 DIAGNOSIS — E78.2 MIXED HYPERLIPIDEMIA: ICD-10-CM

## 2023-02-03 DIAGNOSIS — G45.9 MINI STROKE: ICD-10-CM

## 2023-02-03 DIAGNOSIS — I42.2 HYPERTROPHIC CARDIOMYOPATHY (HCC): ICD-10-CM

## 2023-02-03 DIAGNOSIS — Z71.6 TOBACCO ABUSE COUNSELING: ICD-10-CM

## 2023-02-03 NOTE — PROGRESS NOTES
Patient: Sonal Gonzales  : 1950  Date of Visit: February 3, 2023    REASON FOR VISIT / CONSULTATION: Follow-up (HX:Abn. Stress, Syncope,Atypical CP,Hypertrophic cardiomyopathy,HTN./Patient states feeling ok. SOB when walking. Denies CP,lightheaded/dizziness, palp. )      History of Present Illness:        Dear MURRAY Olea CNP,    I had the pleasure of seeing  Sonal Gonzales in my office today. Ms. Rosenda Yadav is a 67 y.o. female with a recent history of syncope. She is a smoker and has smoked for 50 years at about 0.5 pack per day. She does have a history of diabetes, mini strokes, and hypertension. She denies any known family cardiac history. Echocardiogram on 2022 showed an ejection fraction of 65%. Left ventricular wall thickness is mildly increased. Moderate assymetric septal hypertrophy. The left atrium is mildly dilated (29-33) with a left atrial volume index of 32 ml/m2. Mild mitral regurgitation. Evidence of moderate diastolic dysfunction. Cardiac event monitor done on 2022 showed baseline rhythm is sinus with average heart rate 61 bpm, ranging between 41 and 111 bpm. Atrial tachycardia:  44 episodes. Longest, 18 beats at average 120 bpm up to 144 bpm.  Fastest, 3 beats at average 125 bpm up to 133 bpm. PAC 1.19%. PVC 0.10%. EKG showed normal sinus rhythm with borderline QT prolonged. Abnormal Treadmill Stress Test: Delvalle score of 2-Intermediate risk on 11/15/2022. Loop recorder placement on 2022-no dangerous rhythm seen. Stress test done on 2022 low risk; largely normal myocardial perfusion imaging with soft tissue artifact, but without significant evidence of myocardial ischemia or infarction. Global left ventricular systolic function was normal with an ejection fraction of 68%, without regional wall motion abnormalities. No significant electrocardiographic evidence of myocardial ischemia during EKG monitoring without significant associated arrhythmias.     Ms. Hemanth Crabtree presents to the office today for a follow up after having a loop recorded placed on 11/17/2022 and having a stress test done on 12/27/2022. She denies any further syncopal episodes or dizziness. She continues to notice some shortness of breath, she states this may be slightly worse with long walks. She reports she can walk 100 yards before having some shortness of breath. She reports her heart burn has improved. She denied any current or recent chest pain, abdominal pain, bleeding problems, problems with her medications or any other concerns at this time. Exercise Tolerance: Ms. Hemanth Crabtree reports that she has a fairly good exercise tolerance. Her says that she could walk 100 yards without developing chest discomfort or significant shortness of breath. However she noticed that since she is not working  She does notice that if she walks more than 100 yards she does get shortness of breath. PAST MEDICAL HISTORY:         Past Medical History:   Diagnosis Date    Arthritis     Crespo's esophagus     Diabetes mellitus ([facility] [address]     Hypertension      CURRENT ALLERGIES: Penicillins REVIEW OF SYSTEMS: 14 systems were reviewed. Pertinent positives and negatives as above, all else negative.      Past Surgical History:   Procedure Laterality Date    APPENDECTOMY  1969    BACK SURGERY  1994    upper disc    BACK SURGERY  2019    protruded disc    CHOLECYSTECTOMY      1988    COLONOSCOPY  05/2017    COLONOSCOPY  08/31/2021    Dr. Ellison Guest - normal colonoscopy    COLONOSCOPY N/A 8/31/2021    COLORECTAL CANCER SCREENING, NOT HIGH RISK performed by Meena Carlton MD at [address] [facility]    ENDOSCOPY, COLON, DIAGNOSTIC  07/2019    GASTRIC FUNDOPLICATION  5180    HYSTERECTOMY (CERVIX STATUS UNKNOWN)  1998    still has ovaries    JOINT REPLACEMENT Left 2015    HIP    JOINT REPLACEMENT Right     HIP    KNEE ARTHROSCOPY Bilateral 2000    LUMBAR FUSION N/A 01/18/2019    HARDWARE REMOVAL L4-S1, L3-4 DECOMPRESSION AND FUSION EXTENSION TO L3 performed by Jose J Temple MD at [address] [place] [address] Soni Right 06/09/2016    TOTAL KNEE ARTHROPLASTY Right 2002    TOTAL KNEE ARTHROPLASTY Left 2010    UPPER GASTROINTESTINAL ENDOSCOPY  05/2017    UPPER GASTROINTESTINAL ENDOSCOPY  08/31/2021    Dr. Kalyan Menezes - biopsies    UPPER GASTROINTESTINAL ENDOSCOPY N/A 8/31/2021    EGD BIOPSY performed by Gem Smith MD at [facility] Bilateral 2006    LEG    Social History:  Social History     Tobacco Use    Smoking status: Every Day     Packs/day: 0.50     Years: 50.00     Pack years: 25.00     Types: Cigarettes    Smokeless tobacco: Never   Substance Use Topics    Alcohol use: Yes     Comment: Occasionally    Drug use: No        CURRENT MEDICATIONS:        Outpatient Medications Marked as Taking for the 2/3/23 encounter (Office Visit) with Alex Dao PA-C   Medication Sig Dispense Refill    blood glucose monitor strips Brand per patient preference. Test 1 times a day & as needed for symptoms of irregular blood glucose. Dispense sufficient amount for indicated testing frequency plus additional to accommodate PRN testing needs. 300 strip 3    meloxicam (MOBIC) 15 MG tablet Take 1 tablet by mouth daily as needed for Pain 90 tablet 0    albuterol sulfate HFA (VENTOLIN HFA) 108 (90 Base) MCG/ACT inhaler Inhale 2 puffs into the lungs 4 times daily as needed for Wheezing 1 each 2    alendronate (FOSAMAX) 70 MG tablet TAKE 1 TABLET EVERY 7 DAYS ON THURSDAYS 8 tablet 3    gabapentin (NEURONTIN) 800 MG tablet Take 1 tablet by mouth 2 times daily for 90 days.  180 tablet 3    metoprolol tartrate (LOPRESSOR) 50 MG tablet TAKE 1 TABLET TWICE A  tablet 3    atorvastatin (LIPITOR) 10 MG tablet Take 1 tablet by mouth daily 90 tablet 3    metFORMIN (GLUCOPHAGE) 500 MG tablet Take 1 tablet by mouth 2 times daily (with meals) 180 tablet 3    sertraline (ZOLOFT) 50 MG tablet Take 1 tablet by mouth daily 90 tablet 3    vitamin B-12 (CYANOCOBALAMIN) 100 MCG tablet 1,000 mcg       Calcium Carbonate-Vitamin D (OYSTER SHELL CALCIUM/D) 500-200 MG-UNIT TABS Take 1 tablet by mouth 2 times daily (with meals)      aspirin 81 MG EC tablet Take 81 mg by mouth daily         FAMILY HISTORY: family history includes Cancer in her brother and father; Diabetes in her brother and mother; Heart Disease in her mother; High Blood Pressure in her sister. Physical Examination:     /86 (Site: Left Upper Arm, Position: Sitting, Cuff Size: Medium Adult)   Pulse 52   Resp 18   Ht 5' 9.5\" (1.765 m)   Wt 186 lb 12.8 oz (84.7 kg)   SpO2 96%   BMI 27.19 kg/m²  Body mass index is 27.19 kg/m². Constitutional: She appeared oriented to person and place. She appears well-developed and well-nourished. In no acute distress. HEENT: Normocephalic and atraumatic. No JVD present. Carotid bruit is not present. No mass and no thyromegaly present. No lymphadenopathy noted. Cardiovascular: Normal rate, regular rhythm, normal heart sounds. Exam reveals no gallop and no friction rubs. No murmur was heard. No change in bilateral hand . Pulmonary/Chest: Effort normal . No respiratory distress. She has no wheezes, rhonchi or rales. Decreased lung sounds present. Abdominal: Soft, non-tender. She exhibits no organomegaly, mass or bruit. Extremities: None. No cyanosis or clubbing. 2+ radial and carotid pulses. Distal extremity pulses: 2+ bilaterally. Neurological: Alertness and orientation as per Constitutional exam. No evidence of gross cranial nerve deficit. Coordination appeared normal.   Skin: Skin is warm and dry. There is no rash or diaphoresis. Psychiatric: She has a normal mood and affect.  Her speech is normal and behavior is normal.      MOST RECENT LABS ON RECORD:   Lab Results   Component Value Date    WBC 4.9 01/19/2022    HGB 14.0 01/19/2022    HCT 45.2 01/19/2022     01/19/2022    CHOL 130 11/15/2022    TRIG 70 11/15/2022    HDL 56 11/15/2022    ALT 11 01/19/2022    AST 12 01/19/2022     01/19/2022    K 4.3 01/19/2022     01/19/2022    CREATININE 0.68 01/19/2022    BUN 29 (H) 01/19/2022    CO2 27 01/19/2022    INR 0.93 01/03/2019    LABA1C 6.5 (H) 01/19/2022    LABMICR 11 03/25/2021       ASSESSMENT:     1. Syncope, unspecified syncope type    2. Encounter for loop recorder check    3. Hypertrophic cardiomyopathy ([address]    4. Mini stroke    5. Mixed hyperlipidemia    6. Essential hypertension    7. Tobacco abuse counseling        PLAN:        Low risk Stress test 12/27/2022 following an Abnormal Treadmill Stress Test 11/2022: No longer having atypical chest pain. Mild Abnormal ECG: Borderline QT prolonged. History of recurrent syncope. Additional Testing List: None    Syncope/near syncope of unknown etiology: In the setting of Phenotypic Hypertrophic cardiomyopathy. LINQ Showed no evidence of dangerous rhythms-will continue to monitor. However I did ask that she hit her button today to ensure that we receive her activated patient symptoms, since she has hit her button before and this never recorded, I would like to make sure the button is working properly. She was in agreement with this. Pharmacologic Therapy: Not indicated at this time. Nonpharmacologic counseling: Because of her condition, I reminded her to try and keep herself well-hydrated and to take extra time when moving from laying to sitting, sitting to standing and standing to walking. I also explained to her to help improve her symptoms she should include 3 g sodium diet, 1 or 2 L of sports drinks daily, knee-high compressions stockings. Will plan for home device check on 2/28/2023    Implantable Loop Recorder:  Indication for Device Placement: Unexplained Syncope  Interrogation Findings: I reviewed the results of their most recent home interrogation from 1/18/2023.       Hypertrophic Cardiomyopathy  with 2 episodes of unexplained syncope, no episodes since loop recorder placed 11/2022. Beta Blocker: Continue Metoprolol tartrate (Lopressor) 50 mg bid. ACE Inibitor/ARB: Not indicated at this time. Diuretics: Not indicated at this time. Additional Testing List: None    History of Possible Mini Strokes  Antiplatelet Agent: Continue Aspirin 81 mg daily. Loop recorder as above to look for cryptogenc stroke   Because of the [facility] trial which showed that using Lipitor in patients following a stroke lead to a 25% decrease in the risk of future strokes as appose to people who were not put on a statin. Continue Atorvastatin. Cholesterol Reduction Therapy: Continue Atorvastatin (Lipitor) 10 mg once daily      Hyperlipidemia: Mixed LDL: 60 on 11/15/2022  Cholesterol Reduction Therapy: Continue Atorvastatin (Lipitor) 10 mg once daily      Essential Hypertension: Controlled  Beta Blocker: Continue Metoprolol tartrate (Lopressor) 50 mg bid. ACE Inibitor/ARB: Not indicated at this time. Calcium Channel Blocker: Not indicated at this time. Diuretics: Not indicated at this time. Tobacco Abuse Counseling:  I spent about 5 minutes discussing the dangers of tobacco abuse as well as multiple methods for trying to quit smoking. In the end, Ms. Enciso People said that she did not want any help at the current time, but would try to quit smoking on her own with the use of e-cigarettes, which certainly are not ideal but likely far less dangerous that tobacco.     Finally, I recommended that she continue her other medications and follow up with you as previously scheduled. FOLLOW UP:   I told Ms. Bales to call my office if she had any problems, but otherwise I asked her to Return in about 6 months (around 8/3/2023). However, I would be happy to see her sooner should the need arise.      Sincerely,  Stu Sheehan PA-C  [facility] Cardiology Specialist    [address], [place], [facility] [address]  Phone: [phone], Fax: [phone]     I believe that the risk of significant morbidity and mortality related to the patient's current medical conditions are: Intermediate. Approximately 30 minutes were spent during prep work, discussion and exam of the patient, and follow up documentation and all of their questions were answered.       February 3, 2023

## 2023-02-03 NOTE — PATIENT INSTRUCTIONS
SURVEY:    You may be receiving a survey from [employer] regarding your visit today. Please complete the survey to enable us to provide the highest quality of care to you and your family. If you cannot score us a very good on any question, please call the office to discuss how we could have made your experience a very good one. Thank you.

## 2023-02-21 PROCEDURE — 93298 REM INTERROG DEV EVAL SCRMS: CPT | Performed by: FAMILY MEDICINE

## 2023-02-23 ENCOUNTER — NURSE ONLY (OUTPATIENT)
Dept: CARDIOLOGY | Age: 73
End: 2023-02-23
Payer: COMMERCIAL

## 2023-02-23 DIAGNOSIS — Z45.09 ENCOUNTER FOR LOOP RECORDER CHECK: ICD-10-CM

## 2023-02-23 DIAGNOSIS — R55 SYNCOPE, UNSPECIFIED SYNCOPE TYPE: Primary | ICD-10-CM

## 2023-03-06 RX ORDER — MELOXICAM 15 MG/1
15 TABLET ORAL DAILY PRN
Qty: 30 TABLET | Refills: 1 | Status: SHIPPED | OUTPATIENT
Start: 2023-03-06

## 2023-05-02 RX ORDER — ALENDRONATE SODIUM 70 MG/1
TABLET ORAL
Qty: 8 TABLET | Refills: 0 | Status: SHIPPED | OUTPATIENT
Start: 2023-05-02

## 2023-05-23 PROCEDURE — 93298 REM INTERROG DEV EVAL SCRMS: CPT | Performed by: FAMILY MEDICINE

## 2023-05-24 ENCOUNTER — NURSE ONLY (OUTPATIENT)
Dept: CARDIOLOGY | Age: 73
End: 2023-05-24
Payer: COMMERCIAL

## 2023-05-24 DIAGNOSIS — Z45.09 ENCOUNTER FOR LOOP RECORDER CHECK: ICD-10-CM

## 2023-05-24 DIAGNOSIS — R55 SYNCOPE, UNSPECIFIED SYNCOPE TYPE: Primary | ICD-10-CM

## 2023-06-29 ENCOUNTER — OFFICE VISIT (OUTPATIENT)
Dept: PRIMARY CARE CLINIC | Age: 73
End: 2023-06-29
Payer: MEDICARE

## 2023-06-29 VITALS
WEIGHT: 191.4 LBS | RESPIRATION RATE: 18 BRPM | HEART RATE: 62 BPM | BODY MASS INDEX: 27.86 KG/M2 | SYSTOLIC BLOOD PRESSURE: 120 MMHG | DIASTOLIC BLOOD PRESSURE: 78 MMHG | OXYGEN SATURATION: 97 %

## 2023-06-29 DIAGNOSIS — M25.511 ACUTE PAIN OF RIGHT SHOULDER: Primary | ICD-10-CM

## 2023-06-29 DIAGNOSIS — M81.0 OSTEOPOROSIS, UNSPECIFIED OSTEOPOROSIS TYPE, UNSPECIFIED PATHOLOGICAL FRACTURE PRESENCE: ICD-10-CM

## 2023-06-29 PROCEDURE — 3074F SYST BP LT 130 MM HG: CPT | Performed by: STUDENT IN AN ORGANIZED HEALTH CARE EDUCATION/TRAINING PROGRAM

## 2023-06-29 PROCEDURE — 1123F ACP DISCUSS/DSCN MKR DOCD: CPT | Performed by: STUDENT IN AN ORGANIZED HEALTH CARE EDUCATION/TRAINING PROGRAM

## 2023-06-29 PROCEDURE — 99213 OFFICE O/P EST LOW 20 MIN: CPT | Performed by: STUDENT IN AN ORGANIZED HEALTH CARE EDUCATION/TRAINING PROGRAM

## 2023-06-29 PROCEDURE — 3078F DIAST BP <80 MM HG: CPT | Performed by: STUDENT IN AN ORGANIZED HEALTH CARE EDUCATION/TRAINING PROGRAM

## 2023-06-29 RX ORDER — MELOXICAM 15 MG/1
15 TABLET ORAL DAILY PRN
Qty: 90 TABLET | Refills: 0 | Status: SHIPPED | OUTPATIENT
Start: 2023-06-29 | End: 2023-09-27

## 2023-06-29 RX ORDER — CYCLOBENZAPRINE HCL 10 MG
10 TABLET ORAL 3 TIMES DAILY PRN
Qty: 42 TABLET | Refills: 0 | Status: SHIPPED | OUTPATIENT
Start: 2023-06-29 | End: 2023-07-13

## 2023-06-29 SDOH — ECONOMIC STABILITY: INCOME INSECURITY: HOW HARD IS IT FOR YOU TO PAY FOR THE VERY BASICS LIKE FOOD, HOUSING, MEDICAL CARE, AND HEATING?: NOT VERY HARD

## 2023-06-29 SDOH — ECONOMIC STABILITY: FOOD INSECURITY: WITHIN THE PAST 12 MONTHS, THE FOOD YOU BOUGHT JUST DIDN'T LAST AND YOU DIDN'T HAVE MONEY TO GET MORE.: NEVER TRUE

## 2023-06-29 SDOH — ECONOMIC STABILITY: FOOD INSECURITY: WITHIN THE PAST 12 MONTHS, YOU WORRIED THAT YOUR FOOD WOULD RUN OUT BEFORE YOU GOT MONEY TO BUY MORE.: NEVER TRUE

## 2023-06-29 SDOH — ECONOMIC STABILITY: HOUSING INSECURITY
IN THE LAST 12 MONTHS, WAS THERE A TIME WHEN YOU DID NOT HAVE A STEADY PLACE TO SLEEP OR SLEPT IN A SHELTER (INCLUDING NOW)?: NO

## 2023-06-29 ASSESSMENT — PATIENT HEALTH QUESTIONNAIRE - PHQ9
3. TROUBLE FALLING OR STAYING ASLEEP: 0
7. TROUBLE CONCENTRATING ON THINGS, SUCH AS READING THE NEWSPAPER OR WATCHING TELEVISION: 0
SUM OF ALL RESPONSES TO PHQ QUESTIONS 1-9: 1
5. POOR APPETITE OR OVEREATING: 0
1. LITTLE INTEREST OR PLEASURE IN DOING THINGS: 0
SUM OF ALL RESPONSES TO PHQ QUESTIONS 1-9: 1
SUM OF ALL RESPONSES TO PHQ QUESTIONS 1-9: 1
9. THOUGHTS THAT YOU WOULD BE BETTER OFF DEAD, OR OF HURTING YOURSELF: 0
6. FEELING BAD ABOUT YOURSELF - OR THAT YOU ARE A FAILURE OR HAVE LET YOURSELF OR YOUR FAMILY DOWN: 0
10. IF YOU CHECKED OFF ANY PROBLEMS, HOW DIFFICULT HAVE THESE PROBLEMS MADE IT FOR YOU TO DO YOUR WORK, TAKE CARE OF THINGS AT HOME, OR GET ALONG WITH OTHER PEOPLE: 0
SUM OF ALL RESPONSES TO PHQ QUESTIONS 1-9: 1
2. FEELING DOWN, DEPRESSED OR HOPELESS: 1
4. FEELING TIRED OR HAVING LITTLE ENERGY: 0
8. MOVING OR SPEAKING SO SLOWLY THAT OTHER PEOPLE COULD HAVE NOTICED. OR THE OPPOSITE, BEING SO FIGETY OR RESTLESS THAT YOU HAVE BEEN MOVING AROUND A LOT MORE THAN USUAL: 0
SUM OF ALL RESPONSES TO PHQ9 QUESTIONS 1 & 2: 1

## 2023-08-30 PROCEDURE — 93298 REM INTERROG DEV EVAL SCRMS: CPT | Performed by: FAMILY MEDICINE

## 2023-08-31 DIAGNOSIS — M25.511 ACUTE PAIN OF RIGHT SHOULDER: ICD-10-CM

## 2023-09-01 RX ORDER — MELOXICAM 15 MG/1
15 TABLET ORAL DAILY PRN
Qty: 30 TABLET | Refills: 0 | Status: SHIPPED | OUTPATIENT
Start: 2023-09-01

## 2023-09-01 RX ORDER — METOPROLOL TARTRATE 50 MG/1
50 TABLET, FILM COATED ORAL 2 TIMES DAILY
Qty: 60 TABLET | Refills: 0 | Status: SHIPPED | OUTPATIENT
Start: 2023-09-01 | End: 2023-10-10 | Stop reason: SDUPTHER

## 2023-09-01 RX ORDER — ATORVASTATIN CALCIUM 10 MG/1
10 TABLET, FILM COATED ORAL DAILY
Qty: 30 TABLET | Refills: 0 | Status: SHIPPED | OUTPATIENT
Start: 2023-09-01 | End: 2023-10-10 | Stop reason: SDUPTHER

## 2023-09-01 RX ORDER — GABAPENTIN 800 MG/1
800 TABLET ORAL 2 TIMES DAILY
Qty: 60 TABLET | Refills: 0 | Status: SHIPPED | OUTPATIENT
Start: 2023-09-01 | End: 2023-10-27 | Stop reason: SDUPTHER

## 2023-09-06 ENCOUNTER — NURSE ONLY (OUTPATIENT)
Dept: CARDIOLOGY | Age: 73
End: 2023-09-06
Payer: MEDICARE

## 2023-09-06 DIAGNOSIS — Z45.09 ENCOUNTER FOR LOOP RECORDER CHECK: ICD-10-CM

## 2023-09-06 DIAGNOSIS — R55 SYNCOPE, UNSPECIFIED SYNCOPE TYPE: Primary | ICD-10-CM

## 2023-10-04 RX ORDER — ATORVASTATIN CALCIUM 10 MG/1
10 TABLET, FILM COATED ORAL DAILY
Qty: 90 TABLET | Refills: 3 | OUTPATIENT
Start: 2023-10-04

## 2023-10-04 NOTE — TELEPHONE ENCOUNTER
Provider response noted - patient needs an appointment for refills. OV scheduled for 10/10/23. Will add an appointment note reminder that refills of metformin and atorvastatin are needed.     Francois Powers, PharmD, [facility]  Department, toll free: [phone], option 1      For Pharmacy Admin Tracking Only  Program: Tamica in place:  No  Recommendation Provided To: Provider: 2 via Note to Provider  Intervention Detail: Adherence Monitorin and Refill(s) Provided  Intervention Accepted By: Provider: 0  Gap Closed?: No   Time Spent (min): 30

## 2023-10-04 NOTE — TELEPHONE ENCOUNTER
Glenn Lala, MURRAY - CNP, patient out of refills and patient eligible for up to 90-day supply, if appropriate. Prescription(s) pended for your signature/modification as appropriate for the following medication(s):  Metformin 500 mg Take 1 tablet by mouth 2 times daily (with meals)  Atorvastatin 10 mg daily    Last Visit: 6/29/23  Next Visit: TBD    Thank you,  Maria A Munson, PharmD, [facility]  Department, toll free: [phone], option 1  =======================================================    POPULATION HEALTH CLINICAL PHARMACY: ADHERENCE REVIEW  Identified care gap per Daniel: fills at [facility] : Diabetes and Statin adherence    [facility] - [address] [address] [phone] - f [phone]  [address] [place], [place] [address] [address]  Phone: [phone] Fax: [phone]      Patient also appears to be prescribed: Diabetes and Statin     Current Outpatient Medications   Medication Instructions    albuterol sulfate HFA (VENTOLIN HFA) 108 (90 Base) MCG/ACT inhaler 2 puffs, Inhalation, 4 TIMES DAILY PRN    alendronate (FOSAMAX) 70 MG tablet TAKE 1 TABLET EVERY 7 DAYS ON THURSDAYS    aspirin 81 mg, Oral, DAILY    atorvastatin (LIPITOR) 10 mg, Oral, DAILY    blood glucose monitor strips Brand per patient preference. Test 1 times a day & as needed for symptoms of irregular blood glucose. Dispense sufficient amount for indicated testing frequency plus additional to accommodate PRN testing needs.     Calcium Carbonate-Vitamin D (OYSTER SHELL CALCIUM/D) 500-200 MG-UNIT TABS 1 tablet, Oral, 2 TIMES DAILY WITH MEALS    gabapentin (NEURONTIN) 800 mg, Oral, 2 TIMES DAILY    meloxicam (MOBIC) 15 mg, Oral, DAILY PRN    metFORMIN (GLUCOPHAGE) 500 mg, Oral, 2 TIMES DAILY WITH MEALS    metoprolol tartrate (LOPRESSOR) 50 mg, Oral, 2 TIMES DAILY    sertraline (ZOLOFT) 50 mg, Oral, DAILY    vitamin B-12 (CYANOCOBALAMIN) 100

## 2023-10-10 ENCOUNTER — OFFICE VISIT (OUTPATIENT)
Dept: PRIMARY CARE CLINIC | Age: 73
End: 2023-10-10
Payer: MEDICARE

## 2023-10-10 ENCOUNTER — APPOINTMENT (OUTPATIENT)
Dept: GENERAL RADIOLOGY | Age: 73
End: 2023-10-10
Payer: MEDICARE

## 2023-10-10 ENCOUNTER — APPOINTMENT (OUTPATIENT)
Dept: CT IMAGING | Age: 73
End: 2023-10-10
Payer: MEDICARE

## 2023-10-10 ENCOUNTER — HOSPITAL ENCOUNTER (EMERGENCY)
Age: 73
Discharge: HOME | End: 2023-10-10
Payer: MEDICARE

## 2023-10-10 VITALS
HEIGHT: 70 IN | WEIGHT: 200 LBS | HEART RATE: 71 BPM | TEMPERATURE: 96.8 F | OXYGEN SATURATION: 97 % | SYSTOLIC BLOOD PRESSURE: 110 MMHG | DIASTOLIC BLOOD PRESSURE: 78 MMHG | RESPIRATION RATE: 18 BRPM | BODY MASS INDEX: 28.63 KG/M2

## 2023-10-10 VITALS
OXYGEN SATURATION: 95 % | DIASTOLIC BLOOD PRESSURE: 78 MMHG | RESPIRATION RATE: 17 BRPM | TEMPERATURE: 98.3 F | SYSTOLIC BLOOD PRESSURE: 179 MMHG | HEIGHT: 69 IN | HEART RATE: 61 BPM | BODY MASS INDEX: 29.62 KG/M2 | WEIGHT: 200 LBS

## 2023-10-10 DIAGNOSIS — R07.9 CHEST PAIN, UNSPECIFIED TYPE: Primary | ICD-10-CM

## 2023-10-10 DIAGNOSIS — Z53.29 LEFT AGAINST MEDICAL ADVICE: ICD-10-CM

## 2023-10-10 LAB
ANION GAP SERPL CALCULATED.3IONS-SCNC: 10 MMOL/L (ref 9–17)
BASOPHILS # BLD: 0.03 K/UL (ref 0–0.2)
BASOPHILS NFR BLD: 0 % (ref 0–2)
BUN SERPL-MCNC: 19 MG/DL (ref 8–23)
BUN/CREAT SERPL: 24 (ref 9–20)
CALCIUM SERPL-MCNC: 9.3 MG/DL (ref 8.6–10.4)
CHLORIDE SERPL-SCNC: 101 MMOL/L (ref 98–107)
CO2 SERPL-SCNC: 29 MMOL/L (ref 20–31)
CREAT SERPL-MCNC: 0.8 MG/DL (ref 0.5–0.9)
EOSINOPHIL # BLD: 0.22 K/UL (ref 0–0.44)
EOSINOPHILS RELATIVE PERCENT: 3 % (ref 1–4)
ERYTHROCYTE [DISTWIDTH] IN BLOOD BY AUTOMATED COUNT: 14.2 % (ref 11.8–14.4)
GFR SERPL CREATININE-BSD FRML MDRD: >60 ML/MIN/1.73M2
GLUCOSE SERPL-MCNC: 117 MG/DL (ref 70–99)
HCT VFR BLD AUTO: 41.5 % (ref 36.3–47.1)
HGB BLD-MCNC: 13.1 G/DL (ref 11.9–15.1)
IMM GRANULOCYTES # BLD AUTO: <0.03 K/UL (ref 0–0.3)
IMM GRANULOCYTES NFR BLD: 0 %
LYMPHOCYTES NFR BLD: 1.74 K/UL (ref 1.1–3.7)
LYMPHOCYTES RELATIVE PERCENT: 24 % (ref 24–43)
MCH RBC QN AUTO: 28.4 PG (ref 25.2–33.5)
MCHC RBC AUTO-ENTMCNC: 31.6 G/DL (ref 28.4–34.8)
MCV RBC AUTO: 89.8 FL (ref 82.6–102.9)
MONOCYTES NFR BLD: 0.66 K/UL (ref 0.1–1.2)
MONOCYTES NFR BLD: 9 % (ref 3–12)
NEUTROPHILS NFR BLD: 64 % (ref 36–65)
NEUTS SEG NFR BLD: 4.73 K/UL (ref 1.5–8.1)
NRBC BLD-RTO: 0 PER 100 WBC
PLATELET # BLD AUTO: 226 K/UL (ref 138–453)
PMV BLD AUTO: 10.7 FL (ref 8.1–13.5)
POTASSIUM SERPL-SCNC: 4 MMOL/L (ref 3.7–5.3)
RBC # BLD AUTO: 4.62 M/UL (ref 3.95–5.11)
SODIUM SERPL-SCNC: 140 MMOL/L (ref 135–144)
TROPONIN I SERPL HS-MCNC: 10 NG/L (ref 0–14)
TROPONIN I SERPL HS-MCNC: 9 NG/L (ref 0–14)
WBC OTHER # BLD: 7.4 K/UL (ref 3.5–11.3)

## 2023-10-10 PROCEDURE — 93005 ELECTROCARDIOGRAM TRACING: CPT | Performed by: PHYSICIAN ASSISTANT

## 2023-10-10 PROCEDURE — 85025 COMPLETE CBC W/AUTO DIFF WBC: CPT

## 2023-10-10 PROCEDURE — 99285 EMERGENCY DEPT VISIT HI MDM: CPT

## 2023-10-10 PROCEDURE — 99213 OFFICE O/P EST LOW 20 MIN: CPT | Performed by: NURSE PRACTITIONER

## 2023-10-10 PROCEDURE — 1123F ACP DISCUSS/DSCN MKR DOCD: CPT | Performed by: NURSE PRACTITIONER

## 2023-10-10 PROCEDURE — 71260 CT THORAX DX C+: CPT

## 2023-10-10 PROCEDURE — 6360000004 HC RX CONTRAST MEDICATION: Performed by: PHYSICIAN ASSISTANT

## 2023-10-10 PROCEDURE — 36415 COLL VENOUS BLD VENIPUNCTURE: CPT

## 2023-10-10 PROCEDURE — 71046 X-RAY EXAM CHEST 2 VIEWS: CPT

## 2023-10-10 PROCEDURE — 2580000003 HC RX 258: Performed by: PHYSICIAN ASSISTANT

## 2023-10-10 PROCEDURE — 3078F DIAST BP <80 MM HG: CPT | Performed by: NURSE PRACTITIONER

## 2023-10-10 PROCEDURE — 80048 BASIC METABOLIC PNL TOTAL CA: CPT

## 2023-10-10 PROCEDURE — 3074F SYST BP LT 130 MM HG: CPT | Performed by: NURSE PRACTITIONER

## 2023-10-10 PROCEDURE — 84484 ASSAY OF TROPONIN QUANT: CPT

## 2023-10-10 PROCEDURE — 6370000000 HC RX 637 (ALT 250 FOR IP): Performed by: PHYSICIAN ASSISTANT

## 2023-10-10 RX ORDER — METOPROLOL TARTRATE 50 MG/1
50 TABLET, FILM COATED ORAL 2 TIMES DAILY
Qty: 60 TABLET | Refills: 0 | Status: SHIPPED | OUTPATIENT
Start: 2023-10-10

## 2023-10-10 RX ORDER — ATORVASTATIN CALCIUM 10 MG/1
10 TABLET, FILM COATED ORAL DAILY
Qty: 30 TABLET | Refills: 0 | Status: SHIPPED | OUTPATIENT
Start: 2023-10-10

## 2023-10-10 RX ORDER — 0.9 % SODIUM CHLORIDE 0.9 %
500 INTRAVENOUS SOLUTION INTRAVENOUS ONCE
Status: COMPLETED | OUTPATIENT
Start: 2023-10-10 | End: 2023-10-10

## 2023-10-10 RX ADMIN — ALUMINUM HYDROXIDE, MAGNESIUM HYDROXIDE, AND SIMETHICONE: 200; 200; 20 SUSPENSION ORAL at 17:10

## 2023-10-10 RX ADMIN — IOPAMIDOL 75 ML: 755 INJECTION, SOLUTION INTRAVENOUS at 18:05

## 2023-10-10 RX ADMIN — SODIUM CHLORIDE 500 ML: 9 INJECTION, SOLUTION INTRAVENOUS at 17:26

## 2023-10-10 ASSESSMENT — PATIENT HEALTH QUESTIONNAIRE - PHQ9
3. TROUBLE FALLING OR STAYING ASLEEP: 0
SUM OF ALL RESPONSES TO PHQ QUESTIONS 1-9: 3
5. POOR APPETITE OR OVEREATING: 0
10. IF YOU CHECKED OFF ANY PROBLEMS, HOW DIFFICULT HAVE THESE PROBLEMS MADE IT FOR YOU TO DO YOUR WORK, TAKE CARE OF THINGS AT HOME, OR GET ALONG WITH OTHER PEOPLE: 1
4. FEELING TIRED OR HAVING LITTLE ENERGY: 1
6. FEELING BAD ABOUT YOURSELF - OR THAT YOU ARE A FAILURE OR HAVE LET YOURSELF OR YOUR FAMILY DOWN: 0
SUM OF ALL RESPONSES TO PHQ QUESTIONS 1-9: 3
9. THOUGHTS THAT YOU WOULD BE BETTER OFF DEAD, OR OF HURTING YOURSELF: 0
SUM OF ALL RESPONSES TO PHQ QUESTIONS 1-9: 3
7. TROUBLE CONCENTRATING ON THINGS, SUCH AS READING THE NEWSPAPER OR WATCHING TELEVISION: 0
1. LITTLE INTEREST OR PLEASURE IN DOING THINGS: 1
2. FEELING DOWN, DEPRESSED OR HOPELESS: 1
SUM OF ALL RESPONSES TO PHQ QUESTIONS 1-9: 3
SUM OF ALL RESPONSES TO PHQ9 QUESTIONS 1 & 2: 2
8. MOVING OR SPEAKING SO SLOWLY THAT OTHER PEOPLE COULD HAVE NOTICED. OR THE OPPOSITE, BEING SO FIGETY OR RESTLESS THAT YOU HAVE BEEN MOVING AROUND A LOT MORE THAN USUAL: 0

## 2023-10-10 ASSESSMENT — LIFESTYLE VARIABLES
HOW MANY STANDARD DRINKS CONTAINING ALCOHOL DO YOU HAVE ON A TYPICAL DAY: 3 OR 4
HOW OFTEN DO YOU HAVE A DRINK CONTAINING ALCOHOL: 2-4 TIMES A MONTH

## 2023-10-10 ASSESSMENT — ENCOUNTER SYMPTOMS
NAUSEA: 0
CHEST TIGHTNESS: 1
COUGH: 0
SHORTNESS OF BREATH: 0

## 2023-10-10 ASSESSMENT — PAIN DESCRIPTION - LOCATION: LOCATION: CHEST;BACK

## 2023-10-10 ASSESSMENT — PAIN DESCRIPTION - FREQUENCY: FREQUENCY: CONTINUOUS

## 2023-10-10 ASSESSMENT — PAIN - FUNCTIONAL ASSESSMENT: PAIN_FUNCTIONAL_ASSESSMENT: 0-10

## 2023-10-10 ASSESSMENT — PAIN SCALES - GENERAL: PAINLEVEL_OUTOF10: 7

## 2023-10-10 ASSESSMENT — PAIN DESCRIPTION - PAIN TYPE: TYPE: ACUTE PAIN

## 2023-10-10 ASSESSMENT — PAIN DESCRIPTION - DESCRIPTORS: DESCRIPTORS: PRESSURE

## 2023-10-10 NOTE — DISCHARGE INSTRUCTIONS
It is recommended that you be admitted to the hospital for your chest pain. You would benefit from having a cardiac catheterization done. Return to the emergency department if you change your mind. Otherwise follow-up with your cardiologist Dr. David Aceves in the office. Make sure you are taking an aspirin daily.

## 2023-10-10 NOTE — PROGRESS NOTES
Medicare Annual Wellness Visit    [place] is here for No chief complaint on file. Subjective     Wellness:  She *** well balanced diet. For exercise she notes ***. She *** routine eye exams. She *** routine dental exams. She is vaccinated for covid. Most recent tetanus vaccine ***. She *** UTD shingles vaccine. UTD pneumococcal vaccine. DEXA scan 2021 normal bone mass. Occult blood stool 2022 positive. Mammogram 2021 benign. She *** family history of colorectal cancer. She *** family history of breast cancer. Patient's complete Health Risk Assessment and screening values have been reviewed and are found in Flowsheets. The following problems were reviewed today and where indicated follow up appointments were made and/or referrals ordered. Positive Risk Factor Screenings with Interventions:                        Tobacco Use:  Tobacco Use: High Risk (2023)    Patient History     Smoking Tobacco Use: Every Day     Smokeless Tobacco Use: Never     Passive Exposure: Not on file     E-cigarette/Vaping       Questions Responses    E-cigarette/Vaping Use Never Assessed    Start Date     Passive Exposure     Quit Date     Counseling Given     Comments           Interventions:  {Tobacco Interventions:640042912}    {Optional - Use if Billing for Tobacco Counselin}      {OPTIONAL- LDCT, CVD, STI Counseling Statements:5394531257}              Objective   There were no vitals filed for this visit. There is no height or weight on file to calculate BMI. {OPTIONAL - GENERAL PHYSICAL EXAM (WILL AUTO-DELETE IF NOT NDSE):875839743}       Allergies   Allergen Reactions    Penicillins Hives     Prior to Visit Medications    Medication Sig Taking?  Authorizing Provider   metFORMIN (GLUCOPHAGE) 500 MG tablet Take 1 tablet by mouth 2 times daily (with meals)  Kenya Coil, APRN - CNP   meloxicam (MOBIC) 15 MG tablet Take 1 tablet by mouth daily as needed for Pain  Kenya Coil,
tablet 60 tablet 0     Sig: Take 1 tablet by mouth 2 times daily (with meals)    atorvastatin (LIPITOR) 10 MG tablet 30 tablet 0     Sig: Take 1 tablet by mouth daily    metoprolol tartrate (LOPRESSOR) 50 MG tablet 60 tablet 0     Sig: Take 1 tablet by mouth 2 times daily       No orders of the defined types were placed in this encounter. No results found for this visit on 10/10/23. Return if symptoms worsen or fail to improve.     Electronically signed by MURRAY Moctezuma CNP on 10/10/23 at 4:02 PM.

## 2023-10-10 NOTE — PATIENT INSTRUCTIONS
SURVEY:    You may be receiving a survey from [employer] regarding your visit today. Please complete the survey to enable us to provide the highest quality of care to you and your family. If you cannot score us a very good on any question, please call the office to discuss how we could of made your experience a very good one. Thank you.

## 2023-10-10 NOTE — ED PROVIDER NOTES
[address] [facility]      Pt Name: Simon Sorensen  MRN: [id number]  [address] [facility] [place] 1950  Date of evaluation: 10/10/2023  Provider: Yoselin Rivas PA-C    CHIEF COMPLAINT       Chief Complaint   Patient presents with    Chest Pain     Chest pressure and back pressure for last 2 weeks, Jesus Mojica sent patient to be seen from her office. Loop recorder in place         Sera is a 68 y.o. female who presents to the emergency department with chest pressure. Patient has been exposed to chest pressure for approximately 2 weeks now. It is intense and in the left anterior chest and radiates through to her back. The pain has been fairly persistent. She notices it more at rest and at night. It is not exertional.  It is not associated with any dizziness, shortness of breath, or diaphoresis. She had a routine outpatient checkup appointment with her primary care provider's office today and told them about the chest discomfort and was immediately sent to the emergency department. Patient has no known history of coronary artery disease but has multiple risk factors including diabetes and smoking. She does have a loop recorder present and sees cardiology for syncopal episodes. She does not recall ever having a cardiac catheterization but did have a stress test in early 2022. REVIEW OF SYSTEMS       Review of Systems   Constitutional:  Negative for diaphoresis and fever. Respiratory:  Positive for chest tightness. Negative for cough and shortness of breath. Cardiovascular:  Positive for chest pain. Negative for leg swelling. Gastrointestinal:  Negative for nausea.          PAST MEDICAL HISTORY     Past Medical History:   Diagnosis Date    Arthritis     Crespo's esophagus     Diabetes mellitus ([address])     Hypertension          SURGICAL HISTORY       Past Surgical History:   Procedure Laterality Date    APPENDECTOMY  1969    BACK SURGERY

## 2023-10-11 LAB
EKG ATRIAL RATE: 68 BPM
EKG P AXIS: 46 DEGREES
EKG P-R INTERVAL: 186 MS
EKG Q-T INTERVAL: 436 MS
EKG QRS DURATION: 86 MS
EKG QTC CALCULATION (BAZETT): 463 MS
EKG R AXIS: 44 DEGREES
EKG T AXIS: 52 DEGREES
EKG VENTRICULAR RATE: 68 BPM

## 2023-10-11 PROCEDURE — 93010 ELECTROCARDIOGRAM REPORT: CPT | Performed by: FAMILY MEDICINE

## 2023-10-16 ENCOUNTER — TELEPHONE (OUTPATIENT)
Dept: PRIMARY CARE CLINIC | Age: 73
End: 2023-10-16

## 2023-10-16 NOTE — TELEPHONE ENCOUNTER
ED note reviewed. Patient left AMA after ED provider recommended admission for possible heart cath. I recommend patient schedule ED follow up with myself.  I also recommend she call and f/u with her cardiologist ASAP given her chest pain sx

## 2023-10-16 NOTE — TELEPHONE ENCOUNTER
Roberto Butler would like a call to discuss her ER visit. They brought up a heart cath and she would like to know more info. Would you like her to come in for a appt to discuss?

## 2023-10-18 ENCOUNTER — OFFICE VISIT (OUTPATIENT)
Dept: PRIMARY CARE CLINIC | Age: 73
End: 2023-10-18
Payer: MEDICARE

## 2023-10-18 VITALS
DIASTOLIC BLOOD PRESSURE: 80 MMHG | HEIGHT: 69 IN | BODY MASS INDEX: 29.33 KG/M2 | TEMPERATURE: 97.7 F | OXYGEN SATURATION: 97 % | WEIGHT: 198 LBS | RESPIRATION RATE: 18 BRPM | SYSTOLIC BLOOD PRESSURE: 118 MMHG | HEART RATE: 73 BPM

## 2023-10-18 DIAGNOSIS — Z13.220 LIPID SCREENING: ICD-10-CM

## 2023-10-18 DIAGNOSIS — R91.8 PULMONARY NODULES: Primary | ICD-10-CM

## 2023-10-18 DIAGNOSIS — R07.9 CHEST PAIN, UNSPECIFIED TYPE: ICD-10-CM

## 2023-10-18 DIAGNOSIS — R06.02 SOB (SHORTNESS OF BREATH): ICD-10-CM

## 2023-10-18 DIAGNOSIS — Z13.29 THYROID DISORDER SCREEN: ICD-10-CM

## 2023-10-18 DIAGNOSIS — E11.9 TYPE 2 DIABETES MELLITUS WITHOUT COMPLICATION, WITHOUT LONG-TERM CURRENT USE OF INSULIN (HCC): ICD-10-CM

## 2023-10-18 DIAGNOSIS — I10 ESSENTIAL (PRIMARY) HYPERTENSION: Chronic | ICD-10-CM

## 2023-10-18 DIAGNOSIS — Z72.0 TOBACCO ABUSE: ICD-10-CM

## 2023-10-18 DIAGNOSIS — Z87.448 HX OF HEMATURIA: ICD-10-CM

## 2023-10-18 PROBLEM — E11.40 TYPE 2 DIABETES MELLITUS WITH DIABETIC NEUROPATHY (HCC): Status: ACTIVE | Noted: 2023-10-18

## 2023-10-18 PROCEDURE — 99214 OFFICE O/P EST MOD 30 MIN: CPT | Performed by: NURSE PRACTITIONER

## 2023-10-18 PROCEDURE — 1123F ACP DISCUSS/DSCN MKR DOCD: CPT | Performed by: NURSE PRACTITIONER

## 2023-10-18 PROCEDURE — 3074F SYST BP LT 130 MM HG: CPT | Performed by: NURSE PRACTITIONER

## 2023-10-18 PROCEDURE — 3079F DIAST BP 80-89 MM HG: CPT | Performed by: NURSE PRACTITIONER

## 2023-10-18 RX ORDER — ATORVASTATIN CALCIUM 10 MG/1
10 TABLET, FILM COATED ORAL DAILY
Qty: 90 TABLET | Refills: 1 | Status: SHIPPED | OUTPATIENT
Start: 2023-10-18

## 2023-10-18 RX ORDER — METOPROLOL TARTRATE 50 MG/1
50 TABLET, FILM COATED ORAL 2 TIMES DAILY
Qty: 180 TABLET | Refills: 1 | Status: SHIPPED | OUTPATIENT
Start: 2023-10-18

## 2023-10-18 ASSESSMENT — ENCOUNTER SYMPTOMS: SHORTNESS OF BREATH: 1

## 2023-10-18 NOTE — PROGRESS NOTES
Name: Enrique Mccoy  : 1950         Chief Complaint:     Chief Complaint   Patient presents with    Follow-up     Patient has been exposed to chest pressure for approximately 2 weeks now. It is intense and in the left anterior chest and radiates through to her back. The pain has been fairly persistent. She notices it more at rest and at night. It is not exertional.  It is not associated with any dizziness, shortness of breath, or diaphoresis. Plan was for admission to the hospital with chest pain rule out and possible cardiac catheterization. However, patient would like to go home and is signing out AMA. She    Chest Pain     Following up with cardiology . States no chest pain today. Has been coming and going. History of Present Illness:      Enrique Mccoy is a 68 y.o.  female who presents with Follow-up (Patient has been exposed to chest pressure for approximately 2 weeks now. It is intense and in the left anterior chest and radiates through to her back. The pain has been fairly persistent. She notices it more at rest and at night. It is not exertional.  It is not associated with any dizziness, shortness of breath, or diaphoresis. Plan was for admission to the hospital with chest pain rule out and possible cardiac catheterization. However, patient would like to go home and is signing out AMA. She) and Chest Pain (Following up with cardiology . States no chest pain today. Has been coming and going. )      HPI    Patient presents for chest pain follow-up. She was seen in PCP office 10/10/2023 which she was sent to the ED for complaints of chest pain. She was evaluated by Herbert Pereira, ED 10/10/2023 when she complained of chest pressure x2 weeks, most intense in the left anterior chest and radiates to her back. CT chest PE showed no evidence of PE or acute focal pulmonary abnormality. Chest x-ray showed no acute process.   ED note reviewed stating that the initial EKG showed

## 2023-10-18 NOTE — PATIENT INSTRUCTIONS
SURVEY:    You may be receiving a survey from [employer] regarding your visit today. Please complete the survey to enable us to provide the highest quality of care to you and your family. If you cannot score us a very good on any question, please call the office to discuss how we could of made your experience a very good one. Thank you.

## 2023-10-25 ENCOUNTER — HOSPITAL ENCOUNTER (OUTPATIENT)
Age: 73
Discharge: HOME | End: 2023-10-25
Payer: MEDICARE

## 2023-10-25 ENCOUNTER — HOSPITAL ENCOUNTER (OUTPATIENT)
Dept: PULMONOLOGY | Age: 73
Discharge: HOME | End: 2023-10-25
Payer: MEDICARE

## 2023-10-25 DIAGNOSIS — R91.8 PULMONARY NODULES: ICD-10-CM

## 2023-10-25 DIAGNOSIS — R06.02 SOB (SHORTNESS OF BREATH): ICD-10-CM

## 2023-10-25 DIAGNOSIS — Z87.448 HX OF HEMATURIA: ICD-10-CM

## 2023-10-25 DIAGNOSIS — Z13.220 LIPID SCREENING: ICD-10-CM

## 2023-10-25 DIAGNOSIS — E11.9 TYPE 2 DIABETES MELLITUS WITHOUT COMPLICATION, WITHOUT LONG-TERM CURRENT USE OF INSULIN (HCC): ICD-10-CM

## 2023-10-25 DIAGNOSIS — I10 ESSENTIAL (PRIMARY) HYPERTENSION: Chronic | ICD-10-CM

## 2023-10-25 DIAGNOSIS — Z13.29 THYROID DISORDER SCREEN: ICD-10-CM

## 2023-10-25 LAB
ALBUMIN SERPL-MCNC: 4.6 G/DL (ref 3.5–5.2)
ALBUMIN/GLOB SERPL: 1.4 {RATIO} (ref 1–2.5)
ALP SERPL-CCNC: 90 U/L (ref 35–104)
ALT SERPL-CCNC: 9 U/L (ref 5–33)
ANION GAP SERPL CALCULATED.3IONS-SCNC: 11 MMOL/L (ref 9–17)
AST SERPL-CCNC: 12 U/L
BACTERIA URNS QL MICRO: ABNORMAL
BILIRUB SERPL-MCNC: 0.6 MG/DL (ref 0.3–1.2)
BILIRUB UR QL STRIP: NEGATIVE
BUN SERPL-MCNC: 17 MG/DL (ref 8–23)
BUN/CREAT SERPL: 24 (ref 9–20)
CALCIUM SERPL-MCNC: 9.7 MG/DL (ref 8.6–10.4)
CHLORIDE SERPL-SCNC: 101 MMOL/L (ref 98–107)
CLARITY UR: CLEAR
CO2 SERPL-SCNC: 29 MMOL/L (ref 20–31)
COLOR UR: YELLOW
CREAT SERPL-MCNC: 0.7 MG/DL (ref 0.5–0.9)
CREAT UR-MCNC: 224.1 MG/DL (ref 28–217)
EPI CELLS #/AREA URNS HPF: ABNORMAL /HPF (ref 0–25)
ERYTHROCYTE [DISTWIDTH] IN BLOOD BY AUTOMATED COUNT: 13.9 % (ref 11.8–14.4)
EST. AVERAGE GLUCOSE BLD GHB EST-MCNC: 134 MG/DL
GFR SERPL CREATININE-BSD FRML MDRD: >60 ML/MIN/1.73M2
GLUCOSE SERPL-MCNC: 143 MG/DL (ref 70–99)
GLUCOSE UR STRIP-MCNC: NEGATIVE MG/DL
HBA1C MFR BLD: 6.3 % (ref 4–6)
HCT VFR BLD AUTO: 44.3 % (ref 36.3–47.1)
HGB BLD-MCNC: 13.4 G/DL (ref 11.9–15.1)
HGB UR QL STRIP.AUTO: ABNORMAL
KETONES UR STRIP-MCNC: NEGATIVE MG/DL
LEUKOCYTE ESTERASE UR QL STRIP: NEGATIVE
MCH RBC QN AUTO: 27.7 PG (ref 25.2–33.5)
MCHC RBC AUTO-ENTMCNC: 30.2 G/DL (ref 28.4–34.8)
MCV RBC AUTO: 91.5 FL (ref 82.6–102.9)
MICROALBUMIN UR-MCNC: 18 MG/L
MICROALBUMIN/CREAT UR-RTO: 8 MCG/MG CREAT
MUCOUS THREADS URNS QL MICRO: ABNORMAL
NITRITE UR QL STRIP: NEGATIVE
NRBC BLD-RTO: 0 PER 100 WBC
PH UR STRIP: 6 [PH] (ref 5–9)
PLATELET # BLD AUTO: 234 K/UL (ref 138–453)
PMV BLD AUTO: 11 FL (ref 8.1–13.5)
POTASSIUM SERPL-SCNC: 3.6 MMOL/L (ref 3.7–5.3)
PROT SERPL-MCNC: 8 G/DL (ref 6.4–8.3)
PROT UR STRIP-MCNC: NEGATIVE MG/DL
RBC # BLD AUTO: 4.84 M/UL (ref 3.95–5.11)
RBC #/AREA URNS HPF: ABNORMAL /HPF (ref 0–2)
SODIUM SERPL-SCNC: 141 MMOL/L (ref 135–144)
SP GR UR STRIP: >1.03 (ref 1.01–1.02)
TSH SERPL DL<=0.05 MIU/L-ACNC: 2.65 UIU/ML (ref 0.3–5)
UROBILINOGEN UR STRIP-ACNC: NORMAL EU/DL (ref 0–1)
WBC #/AREA URNS HPF: ABNORMAL /HPF (ref 0–5)
WBC OTHER # BLD: 6.3 K/UL (ref 3.5–11.3)

## 2023-10-25 PROCEDURE — 80053 COMPREHEN METABOLIC PANEL: CPT

## 2023-10-25 PROCEDURE — 94664 DEMO&/EVAL PT USE INHALER: CPT

## 2023-10-25 PROCEDURE — 85027 COMPLETE CBC AUTOMATED: CPT

## 2023-10-25 PROCEDURE — 82043 UR ALBUMIN QUANTITATIVE: CPT

## 2023-10-25 PROCEDURE — 81001 URINALYSIS AUTO W/SCOPE: CPT

## 2023-10-25 PROCEDURE — 36415 COLL VENOUS BLD VENIPUNCTURE: CPT

## 2023-10-25 PROCEDURE — 94729 DIFFUSING CAPACITY: CPT

## 2023-10-25 PROCEDURE — 83036 HEMOGLOBIN GLYCOSYLATED A1C: CPT

## 2023-10-25 PROCEDURE — 80061 LIPID PANEL: CPT

## 2023-10-25 PROCEDURE — 94726 PLETHYSMOGRAPHY LUNG VOLUMES: CPT

## 2023-10-25 PROCEDURE — 84443 ASSAY THYROID STIM HORMONE: CPT

## 2023-10-25 PROCEDURE — 6370000000 HC RX 637 (ALT 250 FOR IP): Performed by: INTERNAL MEDICINE

## 2023-10-25 PROCEDURE — 94060 EVALUATION OF WHEEZING: CPT

## 2023-10-25 PROCEDURE — 82570 ASSAY OF URINE CREATININE: CPT

## 2023-10-25 RX ORDER — ALBUTEROL SULFATE 90 UG/1
4 AEROSOL, METERED RESPIRATORY (INHALATION) ONCE
Status: COMPLETED | OUTPATIENT
Start: 2023-10-25 | End: 2023-10-25

## 2023-10-25 RX ADMIN — ALBUTEROL SULFATE 4 PUFF: 90 AEROSOL, METERED RESPIRATORY (INHALATION) at 08:18

## 2023-10-26 DIAGNOSIS — R80.9 PROTEINURIA, UNSPECIFIED TYPE: Primary | ICD-10-CM

## 2023-10-26 LAB
CHOLEST SERPL-MCNC: 113 MG/DL
CHOLESTEROL/HDL RATIO: 2.1
HDLC SERPL-MCNC: 55 MG/DL
LDLC SERPL CALC-MCNC: 45 MG/DL (ref 0–130)
TRIGL SERPL-MCNC: 67 MG/DL

## 2023-10-27 RX ORDER — GABAPENTIN 800 MG/1
800 TABLET ORAL 2 TIMES DAILY
Qty: 180 TABLET | Refills: 1 | Status: SHIPPED | OUTPATIENT
Start: 2023-10-27 | End: 2024-04-24

## 2023-11-01 ENCOUNTER — OFFICE VISIT (OUTPATIENT)
Dept: CARDIOLOGY | Age: 73
End: 2023-11-01

## 2023-11-01 VITALS
OXYGEN SATURATION: 97 % | DIASTOLIC BLOOD PRESSURE: 80 MMHG | SYSTOLIC BLOOD PRESSURE: 136 MMHG | RESPIRATION RATE: 18 BRPM | WEIGHT: 194 LBS | HEART RATE: 68 BPM | BODY MASS INDEX: 27.77 KG/M2 | HEIGHT: 70 IN

## 2023-11-01 DIAGNOSIS — I42.2 HYPERTROPHIC CARDIOMYOPATHY (HCC): ICD-10-CM

## 2023-11-01 DIAGNOSIS — R55 SYNCOPE, UNSPECIFIED SYNCOPE TYPE: ICD-10-CM

## 2023-11-01 DIAGNOSIS — R07.89 ATYPICAL CHEST PAIN: Primary | ICD-10-CM

## 2023-11-01 DIAGNOSIS — Z71.6 TOBACCO ABUSE COUNSELING: ICD-10-CM

## 2023-11-01 DIAGNOSIS — I10 ESSENTIAL HYPERTENSION: ICD-10-CM

## 2023-11-01 DIAGNOSIS — R06.02 SOB (SHORTNESS OF BREATH): ICD-10-CM

## 2023-11-01 DIAGNOSIS — G45.9 MINI STROKE: ICD-10-CM

## 2023-11-01 DIAGNOSIS — Z45.09 ENCOUNTER FOR LOOP RECORDER CHECK: ICD-10-CM

## 2023-11-01 NOTE — PROGRESS NOTES
explained to her to help improve her symptoms she should include 3 g sodium diet, 1 or 2 L of sports drinks daily, knee-high compressions stockings    Implantable Loop Recorder:  Indication for Device Placement: Unexplained Syncope  Interrogation Findings: I reviewed the results of their most recent home interrogation from 9/6/23. We will get weekly downloads for 1 month to make sure no abnormal rhythm. Hypertrophic Cardiomyopathy  with 2 episodes of unexplained syncope, no episodes since loop recorder placed 11/2022. Beta Blocker: Continue Metoprolol tartrate (Lopressor) 50 mg bid. ACE Inibitor/ARB: Not indicated at this time. Diuretics: Not indicated at this time. Additional Testing List: None    History of Possible Mini Strokes  Antiplatelet Agent: Continue Aspirin 81 mg daily. Loop recorder as above to look for cryptogenc stroke   Because of the [facility] trial which showed that using Lipitor in patients following a stroke lead to a 25% decrease in the risk of future strokes as appose to people who were not put on a statin. Continue Atorvastatin. Cholesterol Reduction Therapy: Continue Atorvastatin (Lipitor) 10 mg once daily      Hyperlipidemia: Mixed LDL: 45 on 10/25/23  Cholesterol Reduction Therapy: Continue Atorvastatin (Lipitor) 10 mg once daily      Essential Hypertension: Controlled  Beta Blocker: Continue Metoprolol tartrate (Lopressor) 50 mg bid. ACE Inibitor/ARB: Not indicated at this time. Calcium Channel Blocker: Not indicated at this time. Diuretics: Not indicated at this time. Tobacco Abuse Counseling:  I spent about 5 minutes discussing the dangers of tobacco abuse as well as multiple methods for trying to quit smoking. In the end, Ms. Norbert Kocher said that she did not want any help at the current time. Finally, I recommended that she continue her other medications and follow up with you as previously scheduled. FOLLOW UP:   I told Ms. Bales to call my office if she had any

## 2023-11-01 NOTE — PATIENT INSTRUCTIONS
SURVEY:    You may be receiving a survey from [employer] regarding your visit today. Please complete the survey to enable us to provide the highest quality of care to you and your family. If you cannot score us a very good on any question, please call the office to discuss how we could have made your experience a very good one. Thank you.

## 2023-11-30 ENCOUNTER — OFFICE VISIT (OUTPATIENT)
Dept: PRIMARY CARE CLINIC | Age: 73
End: 2023-11-30
Payer: MEDICARE

## 2023-11-30 VITALS
RESPIRATION RATE: 16 BRPM | BODY MASS INDEX: 28.29 KG/M2 | SYSTOLIC BLOOD PRESSURE: 120 MMHG | DIASTOLIC BLOOD PRESSURE: 64 MMHG | HEIGHT: 69 IN | OXYGEN SATURATION: 97 % | TEMPERATURE: 98.2 F | HEART RATE: 58 BPM | WEIGHT: 191 LBS

## 2023-11-30 DIAGNOSIS — Z87.898 HX OF CHEST PAIN: ICD-10-CM

## 2023-11-30 DIAGNOSIS — R06.02 SHORTNESS OF BREATH: Primary | ICD-10-CM

## 2023-11-30 DIAGNOSIS — Z12.31 ENCOUNTER FOR SCREENING MAMMOGRAM FOR MALIGNANT NEOPLASM OF BREAST: ICD-10-CM

## 2023-11-30 DIAGNOSIS — Z23 NEEDS FLU SHOT: ICD-10-CM

## 2023-11-30 DIAGNOSIS — Z23 NEED FOR TDAP VACCINATION: ICD-10-CM

## 2023-11-30 PROCEDURE — 90471 IMMUNIZATION ADMIN: CPT | Performed by: NURSE PRACTITIONER

## 2023-11-30 PROCEDURE — 99214 OFFICE O/P EST MOD 30 MIN: CPT | Performed by: NURSE PRACTITIONER

## 2023-11-30 PROCEDURE — 90715 TDAP VACCINE 7 YRS/> IM: CPT | Performed by: NURSE PRACTITIONER

## 2023-11-30 PROCEDURE — 1123F ACP DISCUSS/DSCN MKR DOCD: CPT | Performed by: NURSE PRACTITIONER

## 2023-11-30 PROCEDURE — 90694 VACC AIIV4 NO PRSRV 0.5ML IM: CPT | Performed by: NURSE PRACTITIONER

## 2023-11-30 PROCEDURE — G0008 ADMIN INFLUENZA VIRUS VAC: HCPCS | Performed by: NURSE PRACTITIONER

## 2023-11-30 PROCEDURE — 3074F SYST BP LT 130 MM HG: CPT | Performed by: NURSE PRACTITIONER

## 2023-11-30 PROCEDURE — 3078F DIAST BP <80 MM HG: CPT | Performed by: NURSE PRACTITIONER

## 2023-11-30 ASSESSMENT — ENCOUNTER SYMPTOMS: SHORTNESS OF BREATH: 0

## 2023-11-30 NOTE — PROGRESS NOTES
Name: Chang Jasmine  : 1950         Chief Complaint:     Chief Complaint   Patient presents with    Follow-up     -patient is here for a follow up on chest pain and SOB. Patient denies having any chest pain or SOB. Voices no concerns at this time. History of Present Illness:      Chang Jasmine is a 68 y.o.  female who presents with Follow-up (-patient is here for a follow up on chest pain and SOB. Patient denies having any chest pain or SOB. Voices no concerns at this time. )      HPI    Patient presents for follow-up of chest pain and shortness of breath. Significant workup completed thus far, see below for these results. Patient was evaluated by [facility] cardiology 2023 for symptoms of chest pain and shortness of breath and was ordered echocardiogram as well as Lexiscan stress test.  Of note, patient does have history of hypertrophic cardiomyopathy and TIAs. She is scheduled with pulmonologist at [facility] 2023. Today, she states she has not had any recent chest pain or SOB. She is currently smoking 1/2 PPD. CXR 10/10/23: IMPRESSION:  No acute process. CT Chest PE w Contrast 10/10/23:  No evidence of pulmonary embolism or acute focal pulmonary abnormality. Subtle ground-glass centrilobular nodules noted throughout the lungs may  represent respiratory bronchiolitis interstitial lung disease or chronic  hypersensitivity pneumonitis. Nonobstructive left nephrolithiasis. PFT 10/25/23:  The overall study shows normal FEV1/FVC ratio, there was borderline bronchodilator response, suggestive of bronchospastic component. There is evidence of air trapping. The diffusion capacity was within normal limit. This pattern of abnormality can be seen in early obstructive airway disease.     EKG 10/11/23:  Normal sinus rhythm  Cannot rule out Anterior infarct , age undetermined  Abnormal ECG  When compared with ECG of 16-MAR-2021 14:25,  Nonspecific T wave abnormality no longer

## 2023-12-01 ENCOUNTER — NURSE ONLY (OUTPATIENT)
Dept: CARDIOLOGY | Age: 73
End: 2023-12-01

## 2023-12-01 DIAGNOSIS — Z45.09 ENCOUNTER FOR LOOP RECORDER CHECK: ICD-10-CM

## 2023-12-01 DIAGNOSIS — R55 SYNCOPE, UNSPECIFIED SYNCOPE TYPE: Primary | ICD-10-CM

## 2023-12-04 ENCOUNTER — OFFICE VISIT (OUTPATIENT)
Dept: PULMONOLOGY | Age: 73
End: 2023-12-04
Payer: MEDICARE

## 2023-12-04 VITALS
BODY MASS INDEX: 27.76 KG/M2 | HEIGHT: 70 IN | RESPIRATION RATE: 16 BRPM | DIASTOLIC BLOOD PRESSURE: 85 MMHG | OXYGEN SATURATION: 98 % | TEMPERATURE: 96.9 F | SYSTOLIC BLOOD PRESSURE: 154 MMHG | HEART RATE: 56 BPM | WEIGHT: 193.9 LBS

## 2023-12-04 DIAGNOSIS — F17.200 SMOKING: ICD-10-CM

## 2023-12-04 DIAGNOSIS — J44.9 CHRONIC OBSTRUCTIVE PULMONARY DISEASE, UNSPECIFIED COPD TYPE (HCC): Primary | ICD-10-CM

## 2023-12-04 DIAGNOSIS — J84.115 RESPIRATORY BRONCHIOLITIS ASSOCIATED INTERSTITIAL LUNG DISEASE (HCC): ICD-10-CM

## 2023-12-04 DIAGNOSIS — I10 ESSENTIAL (PRIMARY) HYPERTENSION: Chronic | ICD-10-CM

## 2023-12-04 PROCEDURE — 1123F ACP DISCUSS/DSCN MKR DOCD: CPT | Performed by: INTERNAL MEDICINE

## 2023-12-04 PROCEDURE — 3077F SYST BP >= 140 MM HG: CPT | Performed by: INTERNAL MEDICINE

## 2023-12-04 PROCEDURE — 3079F DIAST BP 80-89 MM HG: CPT | Performed by: INTERNAL MEDICINE

## 2023-12-04 PROCEDURE — 99204 OFFICE O/P NEW MOD 45 MIN: CPT | Performed by: INTERNAL MEDICINE

## 2023-12-04 RX ORDER — ALBUTEROL SULFATE 90 UG/1
2 AEROSOL, METERED RESPIRATORY (INHALATION) 4 TIMES DAILY PRN
Qty: 1 EACH | Refills: 2 | Status: SHIPPED | OUTPATIENT
Start: 2023-12-04

## 2023-12-04 NOTE — PATIENT INSTRUCTIONS
SURVEY:    You may be receiving a survey from [employer] regarding your visit today. Please complete the survey to enable us to provide the highest quality of care to you and your family. If you cannot score us a very good on any question, please call the office to discuss how we could have made your experience a very good one. Thank you. Please recheck your blood pressure when you go home and make sure you take your prescribed medication if applicable . Please follow up with your PCP or ER if needed.

## 2023-12-04 NOTE — PROGRESS NOTES
[place] [facility] [address] [facility] OF [facility]  [address]  Dept: [phone]  Dept Fax: [phone]      12/4/23    Patient: Charlotte Augustine  YOB: 1950    Dear MURRAY Lizarraga - CNP,    I had the pleasure of seeing one of your patients, Charlotte Augustine today in the office today. Please find attached my note with the assessment and plan of care. Thank you for allowing me to participate in the care of this patient. I will keep you updated on this patient's follow up and I look forward to serving you and your patients again in the future.     Anne House MD  12/4/2023 10:49 AM
Height: 1.765 m (5' 9.5\")      Constitutional: This is a well developed, well nourished, 25-29.9 - Overweight 68y.o. year old female who is alert, oriented, cooperative and in no apparent distress. Head:normocephalic and atraumatic. EENT:  BUCK. No conjunctival injections. Septum was midline, mucosa was without erythema, exudates or cobblestoning. No thrush was noted. MallampatiIII (soft palate, base of uvula visible)  Neck: Supple without thyromegaly. No elevated JVP. Trachea was midline. Respiratory: Chest was symmetrical without dullness to percussion. Breath sounds bilaterally were clear to auscultation. There were no wheezes, rhonchi or rales. There is no intercostal retraction or use of accessory muscles. No egophony noted. Cardiovascular: Regular without murmur, clicks, gallops or rubs. Abdomen: Slightly rounded and soft without organomegaly. No rebound, rigidity or guarding was appreciated. Lymphatic: No lymphadenopathy. Musculoskeletal: Normal curvature of the spine. No gross muscle weakness. Extremities:  does not have lower extremity edema,  no ulcerations, tenderness, varicosities or erythema. Muscle size, tone and strength are normal.  No involuntary movements are noted. Skin:  Warm and dry. Good color, turgor and pigmentation. No lesions or scars. No cyanosis or clubbing  Neurological/Psychiatric: The patient's general behavior, level of consciousness, thought content and emotional status is normal.          DATA:     Pulmonary function tests: normal with a bronchospastic component in October 2023    CXR: REVIEWED: On 10/10/2023 without any acute process    CT scan of the chest for pulmonary embolism was done on 10/10/2023 and it showed-  No evidence of pulmonary embolism or acute focal pulmonary abnormality.      Subtle ground-glass centrilobular nodules noted throughout the lungs may  represent respiratory bronchiolitis interstitial lung disease or

## 2024-02-28 ENCOUNTER — NURSE ONLY (OUTPATIENT)
Dept: CARDIOLOGY | Age: 74
End: 2024-02-28

## 2024-02-28 DIAGNOSIS — R55 SYNCOPE, UNSPECIFIED SYNCOPE TYPE: Primary | ICD-10-CM

## 2024-02-28 DIAGNOSIS — Z45.09 ENCOUNTER FOR LOOP RECORDER CHECK: ICD-10-CM

## 2024-03-22 RX ORDER — GABAPENTIN 800 MG/1
800 TABLET ORAL 2 TIMES DAILY
Qty: 90 TABLET | Refills: 0 | Status: SHIPPED | OUTPATIENT
Start: 2024-03-22 | End: 2024-06-20

## 2024-03-22 RX ORDER — ATORVASTATIN CALCIUM 10 MG/1
10 TABLET, FILM COATED ORAL DAILY
Qty: 90 TABLET | Refills: 1 | Status: SHIPPED | OUTPATIENT
Start: 2024-03-22

## 2024-03-22 RX ORDER — METOPROLOL TARTRATE 50 MG/1
50 TABLET, FILM COATED ORAL 2 TIMES DAILY
Qty: 180 TABLET | Refills: 1 | Status: SHIPPED | OUTPATIENT
Start: 2024-03-22

## 2024-04-23 ENCOUNTER — HOSPITAL ENCOUNTER (OUTPATIENT)
Age: 74
Discharge: HOME | End: 2024-04-25
Payer: MEDICARE

## 2024-04-23 ENCOUNTER — HOSPITAL ENCOUNTER (OUTPATIENT)
Dept: GENERAL RADIOLOGY | Age: 74
Discharge: HOME | End: 2024-04-25
Payer: MEDICARE

## 2024-04-23 ENCOUNTER — OFFICE VISIT (OUTPATIENT)
Dept: PRIMARY CARE CLINIC | Age: 74
End: 2024-04-23
Payer: MEDICARE

## 2024-04-23 VITALS
TEMPERATURE: 96 F | DIASTOLIC BLOOD PRESSURE: 70 MMHG | BODY MASS INDEX: 29.03 KG/M2 | WEIGHT: 196 LBS | HEART RATE: 74 BPM | HEIGHT: 69 IN | OXYGEN SATURATION: 95 % | SYSTOLIC BLOOD PRESSURE: 130 MMHG

## 2024-04-23 DIAGNOSIS — R05.1 ACUTE COUGH: Primary | ICD-10-CM

## 2024-04-23 DIAGNOSIS — R05.1 ACUTE COUGH: ICD-10-CM

## 2024-04-23 PROCEDURE — G8427 DOCREV CUR MEDS BY ELIG CLIN: HCPCS | Performed by: NURSE PRACTITIONER

## 2024-04-23 PROCEDURE — 1090F PRES/ABSN URINE INCON ASSESS: CPT | Performed by: NURSE PRACTITIONER

## 2024-04-23 PROCEDURE — 3075F SYST BP GE 130 - 139MM HG: CPT | Performed by: NURSE PRACTITIONER

## 2024-04-23 PROCEDURE — 3078F DIAST BP <80 MM HG: CPT | Performed by: NURSE PRACTITIONER

## 2024-04-23 PROCEDURE — 3017F COLORECTAL CA SCREEN DOC REV: CPT | Performed by: NURSE PRACTITIONER

## 2024-04-23 PROCEDURE — G8419 CALC BMI OUT NRM PARAM NOF/U: HCPCS | Performed by: NURSE PRACTITIONER

## 2024-04-23 PROCEDURE — 1123F ACP DISCUSS/DSCN MKR DOCD: CPT | Performed by: NURSE PRACTITIONER

## 2024-04-23 PROCEDURE — 4004F PT TOBACCO SCREEN RCVD TLK: CPT | Performed by: NURSE PRACTITIONER

## 2024-04-23 PROCEDURE — 71046 X-RAY EXAM CHEST 2 VIEWS: CPT

## 2024-04-23 PROCEDURE — G8400 PT W/DXA NO RESULTS DOC: HCPCS | Performed by: NURSE PRACTITIONER

## 2024-04-23 PROCEDURE — 99213 OFFICE O/P EST LOW 20 MIN: CPT | Performed by: NURSE PRACTITIONER

## 2024-04-23 RX ORDER — PREDNISONE 20 MG/1
20 TABLET ORAL 2 TIMES DAILY
Qty: 10 TABLET | Refills: 0 | Status: SHIPPED | OUTPATIENT
Start: 2024-04-23 | End: 2024-04-28

## 2024-04-23 RX ORDER — DOXYCYCLINE HYCLATE 100 MG
100 TABLET ORAL 2 TIMES DAILY
Qty: 14 TABLET | Refills: 0 | Status: SHIPPED | OUTPATIENT
Start: 2024-04-23 | End: 2024-04-30

## 2024-04-23 NOTE — PROGRESS NOTES
No external ear normal. There is no impacted cerumen.      Nose: Nose normal. No congestion or rhinorrhea.      Mouth/Throat:      Mouth: Mucous membranes are moist.   Cardiovascular:      Rate and Rhythm: Normal rate and regular rhythm.      Heart sounds: Normal heart sounds. No murmur heard.  Pulmonary:      Breath sounds: Normal breath sounds. No wheezing.      Comments: Rhonchi bilateral lower lobes and right upper lobe  Neurological:      Mental Status: She is alert.   Psychiatric:         Mood and Affect: Mood normal.         Behavior: Behavior normal.         Thought Content: Thought content normal.         Judgment: Judgment normal.         Data:     Lab Results   Component Value Date/Time     10/25/2023 08:35 AM    K 3.6 10/25/2023 08:35 AM     10/25/2023 08:35 AM    CO2 29 10/25/2023 08:35 AM    BUN 17 10/25/2023 08:35 AM    CREATININE 0.7 10/25/2023 08:35 AM    GLUCOSE 143 10/25/2023 08:35 AM    PROT 8.0 10/25/2023 08:35 AM    BILITOT 0.6 10/25/2023 08:35 AM    ALKPHOS 90 10/25/2023 08:35 AM    AST 12 10/25/2023 08:35 AM    ALT 9 10/25/2023 08:35 AM     Lab Results   Component Value Date/Time    WBC 6.3 10/25/2023 08:35 AM    RBC 4.84 10/25/2023 08:35 AM    HGB 13.4 10/25/2023 08:35 AM    HCT 44.3 10/25/2023 08:35 AM    MCV 91.5 10/25/2023 08:35 AM    MCH 27.7 10/25/2023 08:35 AM    MCHC 30.2 10/25/2023 08:35 AM    RDW 13.9 10/25/2023 08:35 AM     10/25/2023 08:35 AM    MPV 11.0 10/25/2023 08:35 AM     Lab Results   Component Value Date/Time    TSH 2.65 10/25/2023 08:35 AM     Lab Results   Component Value Date/Time    CHOL 113 10/25/2023 08:35 AM    HDL 55 10/25/2023 08:35 AM    LABA1C 6.3 10/25/2023 08:35 AM       Assessment/Plan:      Diagnosis Orders   1. Acute cough  XR CHEST STANDARD (2 VW)          - No POC influenza or COVID performed today as patient has had symptoms for 8 days and this will likely not change treatment plan as she is outside of treatment window for both COVID and

## 2024-04-24 ASSESSMENT — ENCOUNTER SYMPTOMS
WHEEZING: 1
COUGH: 1
SHORTNESS OF BREATH: 1

## 2024-05-09 RX ORDER — ATORVASTATIN CALCIUM 10 MG/1
10 TABLET, FILM COATED ORAL DAILY
Qty: 90 TABLET | Refills: 1 | Status: SHIPPED | OUTPATIENT
Start: 2024-05-09

## 2024-05-09 RX ORDER — METOPROLOL TARTRATE 50 MG/1
50 TABLET, FILM COATED ORAL 2 TIMES DAILY
Qty: 180 TABLET | Refills: 1 | Status: SHIPPED | OUTPATIENT
Start: 2024-05-09

## 2024-05-09 RX ORDER — GABAPENTIN 800 MG/1
800 TABLET ORAL 2 TIMES DAILY
Qty: 90 TABLET | Refills: 0 | Status: SHIPPED | OUTPATIENT
Start: 2024-05-09 | End: 2024-08-07

## 2024-05-30 NOTE — TELEPHONE ENCOUNTER
I spoke with pt., notified of message. She reports seeing [facility] for several years for her Crespo's Esophagus, typically has an EGD every few years. She's not sure if #1 they take her current insurance & #2 would need a new referral placed. She is to return my call, dx listed.
[facility] is not in sure insurance plan.  Patient is calling [facility] to confirm that they take her innsurance
Noted. Thanks!
Please notify patient I have reviewed her CT results from 3/2021. At this time, I recommend EGD to further evaluate epigastric pain and nausea. If patient agreeable, please place referral to general surgery.
Pt c/b, states Madhuri Salmeron can eval her but has to be at [facility] d/t her Crespo's Esophagus. Referral generated, they will call her to schedule.
Was she able to be scheduled?
30-May-2024 10:22

## 2024-05-31 ENCOUNTER — NURSE ONLY (OUTPATIENT)
Dept: CARDIOLOGY | Age: 74
End: 2024-05-31

## 2024-05-31 DIAGNOSIS — R55 SYNCOPE, UNSPECIFIED SYNCOPE TYPE: Primary | ICD-10-CM

## 2024-05-31 DIAGNOSIS — Z45.09 ENCOUNTER FOR LOOP RECORDER CHECK: ICD-10-CM

## 2024-06-17 ENCOUNTER — OFFICE VISIT (OUTPATIENT)
Dept: PRIMARY CARE CLINIC | Age: 74
End: 2024-06-17
Payer: MEDICARE

## 2024-06-17 VITALS
HEIGHT: 69 IN | SYSTOLIC BLOOD PRESSURE: 126 MMHG | WEIGHT: 196 LBS | OXYGEN SATURATION: 96 % | BODY MASS INDEX: 29.03 KG/M2 | HEART RATE: 71 BPM | DIASTOLIC BLOOD PRESSURE: 64 MMHG

## 2024-06-17 DIAGNOSIS — R60.0 EDEMA OF FOOT: Primary | ICD-10-CM

## 2024-06-17 DIAGNOSIS — R23.8 OTHER SKIN CHANGES: ICD-10-CM

## 2024-06-17 PROCEDURE — G8419 CALC BMI OUT NRM PARAM NOF/U: HCPCS | Performed by: STUDENT IN AN ORGANIZED HEALTH CARE EDUCATION/TRAINING PROGRAM

## 2024-06-17 PROCEDURE — G8400 PT W/DXA NO RESULTS DOC: HCPCS | Performed by: STUDENT IN AN ORGANIZED HEALTH CARE EDUCATION/TRAINING PROGRAM

## 2024-06-17 PROCEDURE — 3017F COLORECTAL CA SCREEN DOC REV: CPT | Performed by: STUDENT IN AN ORGANIZED HEALTH CARE EDUCATION/TRAINING PROGRAM

## 2024-06-17 PROCEDURE — 99214 OFFICE O/P EST MOD 30 MIN: CPT | Performed by: STUDENT IN AN ORGANIZED HEALTH CARE EDUCATION/TRAINING PROGRAM

## 2024-06-17 PROCEDURE — 1123F ACP DISCUSS/DSCN MKR DOCD: CPT | Performed by: STUDENT IN AN ORGANIZED HEALTH CARE EDUCATION/TRAINING PROGRAM

## 2024-06-17 PROCEDURE — 3078F DIAST BP <80 MM HG: CPT | Performed by: STUDENT IN AN ORGANIZED HEALTH CARE EDUCATION/TRAINING PROGRAM

## 2024-06-17 PROCEDURE — G8427 DOCREV CUR MEDS BY ELIG CLIN: HCPCS | Performed by: STUDENT IN AN ORGANIZED HEALTH CARE EDUCATION/TRAINING PROGRAM

## 2024-06-17 PROCEDURE — 3074F SYST BP LT 130 MM HG: CPT | Performed by: STUDENT IN AN ORGANIZED HEALTH CARE EDUCATION/TRAINING PROGRAM

## 2024-06-17 PROCEDURE — 1090F PRES/ABSN URINE INCON ASSESS: CPT | Performed by: STUDENT IN AN ORGANIZED HEALTH CARE EDUCATION/TRAINING PROGRAM

## 2024-06-17 PROCEDURE — 4004F PT TOBACCO SCREEN RCVD TLK: CPT | Performed by: STUDENT IN AN ORGANIZED HEALTH CARE EDUCATION/TRAINING PROGRAM

## 2024-06-17 RX ORDER — FUROSEMIDE 20 MG/1
20 TABLET ORAL DAILY PRN
Qty: 90 TABLET | Refills: 0 | Status: SHIPPED | OUTPATIENT
Start: 2024-06-17

## 2024-06-17 RX ORDER — GLUCOSAMINE HCL/CHONDROITIN SU 500-400 MG
CAPSULE ORAL
Qty: 100 STRIP | Refills: 3 | Status: SHIPPED | OUTPATIENT
Start: 2024-06-17

## 2024-06-17 NOTE — PROGRESS NOTES
[facility]  [address] [place] [address] , [address]  [place], [place], [address]    Ann Bales is a 73 y.o. female with  has a past medical history of Arthritis, Crespo's esophagus, Diabetes mellitus (HCC), and Hypertension.  Presented to the office today for:  Chief Complaint   Patient presents with    Foot Swelling       Assessment/Plan   1. Edema of foot [R60.0]  -     Vascular duplex lower extremity venous bilateral; Future  -     furosemide (LASIX) 20 MG tablet; Take 1 tablet by mouth daily as needed (Leg edema), Disp-90 tablet, R-0Normal  2. Other skin changes  -     Vascular duplex lower extremity venous bilateral; Future  -     furosemide (LASIX) 20 MG tablet; Take 1 tablet by mouth daily as needed (Leg edema), Disp-90 tablet, R-0Normal  Return if symptoms worsen or fail to improve.    Monitor salt intake to <2g.  Use of compression stockings daily.  Trial of Lasix 20mg PO daily.  If symptoms persist, then plan for imaging, VL DUP  Patient will check in 1-2 weeks.     All patient questions answered.  Pt voiced understanding.   There are no discontinued medications.    Patient received counseling on the following healthy behaviors: nutrition, exercise and medication adherence. I encouraged and discussed lifestyle modifications including diet and exercise (150+ minutes of moderate-high intensity) and the patient was agreeable to making positive/beneficial changes to both to help improve their overall health. Discussed use, benefit, and side effects of prescribed medications.  Barriers to medication compliance addressed. Patient given educational materials: see patient instructions.     HM - HM items completed today as per orders. Outstanding HM items though not limited to immunizations were discussed with the patient today, including risks, benefits and alternatives. The patient will discuss these during the next appointment per their preference. If there are any worsening or concerning

## 2024-06-24 ENCOUNTER — HOSPITAL ENCOUNTER (OUTPATIENT)
Dept: WOMENS IMAGING | Age: 74
Discharge: HOME | End: 2024-06-26
Payer: MEDICARE

## 2024-06-24 VITALS — HEIGHT: 69 IN | WEIGHT: 196 LBS | BODY MASS INDEX: 29.03 KG/M2

## 2024-06-24 DIAGNOSIS — Z12.31 ENCOUNTER FOR SCREENING MAMMOGRAM FOR MALIGNANT NEOPLASM OF BREAST: ICD-10-CM

## 2024-06-24 PROCEDURE — 77067 SCR MAMMO BI INCL CAD: CPT

## 2024-06-26 DIAGNOSIS — E11.9 TYPE 2 DIABETES MELLITUS WITHOUT COMPLICATION, WITHOUT LONG-TERM CURRENT USE OF INSULIN (HCC): Primary | ICD-10-CM

## 2024-06-26 RX ORDER — GABAPENTIN 800 MG/1
800 TABLET ORAL 2 TIMES DAILY
Qty: 180 TABLET | Refills: 0 | Status: SHIPPED | OUTPATIENT
Start: 2024-06-26 | End: 2024-09-24

## 2024-06-26 RX ORDER — GLUCOSAMINE HCL/CHONDROITIN SU 500-400 MG
1 CAPSULE ORAL DAILY
Qty: 90 STRIP | Refills: 2 | Status: SHIPPED | OUTPATIENT
Start: 2024-06-26 | End: 2025-03-23

## 2024-06-26 NOTE — TELEPHONE ENCOUNTER
[facility] called stating that they need a new script sent in for the glucose monitor test strips, script must say 90 day supply. Script pending

## 2024-06-28 RX ORDER — GLUCOSAM/CHON-MSM1/C/MANG/BOSW 500-416.6
1 TABLET ORAL DAILY
Qty: 100 EACH | Refills: 2 | Status: SHIPPED | OUTPATIENT
Start: 2024-06-28

## 2024-06-28 RX ORDER — DIPHENHYDRAMINE HCL 25 MG
1 TABLET ORAL DAILY
Qty: 1 KIT | Refills: 0 | Status: SHIPPED | OUTPATIENT
Start: 2024-06-28

## 2024-07-31 ENCOUNTER — TELEPHONE (OUTPATIENT)
Dept: PRIMARY CARE CLINIC | Age: 74
End: 2024-07-31

## 2024-07-31 DIAGNOSIS — K22.719 BARRETT'S ESOPHAGUS WITH DYSPLASIA: Primary | ICD-10-CM

## 2024-07-31 NOTE — TELEPHONE ENCOUNTER
Patient called and was requesting a possible referral for GI at [facility]. Patient states she has Barretts Esophagus and is due to get an egd. Patient insurance changed and Dr. Walker is within network. Please advise.

## 2024-08-21 ENCOUNTER — NURSE ONLY (OUTPATIENT)
Dept: CARDIOLOGY | Age: 74
End: 2024-08-21

## 2024-08-21 DIAGNOSIS — R55 SYNCOPE, UNSPECIFIED SYNCOPE TYPE: Primary | ICD-10-CM

## 2024-08-21 DIAGNOSIS — Z45.09 ENCOUNTER FOR LOOP RECORDER CHECK: ICD-10-CM

## 2024-08-27 ENCOUNTER — OFFICE VISIT (OUTPATIENT)
Dept: PRIMARY CARE CLINIC | Age: 74
End: 2024-08-27
Payer: MEDICARE

## 2024-08-27 ENCOUNTER — HOSPITAL ENCOUNTER (OUTPATIENT)
Age: 74
Setting detail: SPECIMEN
Discharge: HOME | End: 2024-08-27
Payer: MEDICARE

## 2024-08-27 ENCOUNTER — TELEPHONE (OUTPATIENT)
Dept: PRIMARY CARE CLINIC | Age: 74
End: 2024-08-27

## 2024-08-27 VITALS
WEIGHT: 195 LBS | HEART RATE: 77 BPM | OXYGEN SATURATION: 96 % | BODY MASS INDEX: 28.8 KG/M2 | DIASTOLIC BLOOD PRESSURE: 72 MMHG | SYSTOLIC BLOOD PRESSURE: 130 MMHG | TEMPERATURE: 95.5 F

## 2024-08-27 DIAGNOSIS — R30.0 BURNING WITH URINATION: ICD-10-CM

## 2024-08-27 DIAGNOSIS — R32 URINARY INCONTINENCE, UNSPECIFIED TYPE: ICD-10-CM

## 2024-08-27 DIAGNOSIS — N30.01 ACUTE CYSTITIS WITH HEMATURIA: Primary | ICD-10-CM

## 2024-08-27 DIAGNOSIS — R31.9 HEMATURIA, UNSPECIFIED TYPE: ICD-10-CM

## 2024-08-27 LAB
BILIRUBIN, POC: 1
BLOOD URINE, POC: NORMAL
CLARITY, POC: NORMAL
COLOR, POC: NORMAL
GLUCOSE URINE, POC: 500
KETONES, POC: NORMAL
LEUKOCYTE EST, POC: NORMAL
NITRITE, POC: NORMAL
PH, POC: 5
PROTEIN, POC: NORMAL
SPECIFIC GRAVITY, POC: 1.02
UROBILINOGEN, POC: 0.2

## 2024-08-27 PROCEDURE — 87088 URINE BACTERIA CULTURE: CPT

## 2024-08-27 PROCEDURE — G8427 DOCREV CUR MEDS BY ELIG CLIN: HCPCS | Performed by: NURSE PRACTITIONER

## 2024-08-27 PROCEDURE — G8419 CALC BMI OUT NRM PARAM NOF/U: HCPCS | Performed by: NURSE PRACTITIONER

## 2024-08-27 PROCEDURE — G8400 PT W/DXA NO RESULTS DOC: HCPCS | Performed by: NURSE PRACTITIONER

## 2024-08-27 PROCEDURE — 1123F ACP DISCUSS/DSCN MKR DOCD: CPT | Performed by: NURSE PRACTITIONER

## 2024-08-27 PROCEDURE — 1090F PRES/ABSN URINE INCON ASSESS: CPT | Performed by: NURSE PRACTITIONER

## 2024-08-27 PROCEDURE — 99214 OFFICE O/P EST MOD 30 MIN: CPT | Performed by: NURSE PRACTITIONER

## 2024-08-27 PROCEDURE — 3078F DIAST BP <80 MM HG: CPT | Performed by: NURSE PRACTITIONER

## 2024-08-27 PROCEDURE — 0509F URINE INCON PLAN DOCD: CPT | Performed by: NURSE PRACTITIONER

## 2024-08-27 PROCEDURE — 4004F PT TOBACCO SCREEN RCVD TLK: CPT | Performed by: NURSE PRACTITIONER

## 2024-08-27 PROCEDURE — 87186 SC STD MICRODIL/AGAR DIL: CPT

## 2024-08-27 PROCEDURE — 3017F COLORECTAL CA SCREEN DOC REV: CPT | Performed by: NURSE PRACTITIONER

## 2024-08-27 PROCEDURE — 3075F SYST BP GE 130 - 139MM HG: CPT | Performed by: NURSE PRACTITIONER

## 2024-08-27 PROCEDURE — 81002 URINALYSIS NONAUTO W/O SCOPE: CPT | Performed by: NURSE PRACTITIONER

## 2024-08-27 PROCEDURE — 87086 URINE CULTURE/COLONY COUNT: CPT

## 2024-08-27 RX ORDER — NITROFURANTOIN 25; 75 MG/1; MG/1
100 CAPSULE ORAL 2 TIMES DAILY
Qty: 10 CAPSULE | Refills: 0 | Status: SHIPPED | OUTPATIENT
Start: 2024-08-27 | End: 2024-09-01

## 2024-08-27 NOTE — PROGRESS NOTES
chronic history of urinary incontinence. Discussed options for treatment including pelvic floor therapy, medication therapy, etc. She is agreeable to consult with urology. Referral placed to [facility]   - Notify office if symptoms worsen or persist  - Reviewed emergent s/s such as fever and chills and when to seek care at the ED  - Patient is due for routine follow-up.  She will schedule within the next 1 to 2 months to follow-up on chronic concerns    -- Reviewed emergent signs and symptoms and when to seek care at the emergency department and/or call 911     Completed Refills   Requested Prescriptions     Signed Prescriptions Disp Refills    nitrofurantoin, macrocrystal-monohydrate, (MACROBID) 100 MG capsule 10 capsule 0     Sig: Take 1 capsule by mouth 2 times daily for 5 days       Orders Placed This Encounter   Procedures    Culture, Urine     Standing Status:   Future     Standing Expiration Date:   8/27/2025    Isaac Duran MD, Urology, [place]     Referral Priority:   Routine     Referral Type:   Eval and Treat     Referral Reason:   Specialty Services Required     Referred to Provider:   Isaac Dsouza MD     Requested Specialty:   Urology     Number of Visits Requested:   1    POCT Urinalysis Dipstick no Micro        Results for POC orders placed in visit on 08/27/24   POCT Urinalysis Dipstick no Micro   Result Value Ref Range    Color, UA orange     Clarity, UA cloudy     Glucose, UA      Bilirubin, UA 1     Ketones, UA neg     Spec Grav, UA 1.025     Blood, UA POC large     pH, UA 5     Protein, UA POC 3+     Urobilinogen, UA 0.2     Leukocytes, UA mod++     Nitrite, UA neg        Return if symptoms worsen or fail to improve.    Electronically signed by MURRAY Agosto CNP on 08/27/24 at 1:22 PM.

## 2024-08-29 LAB
MICROORGANISM SPEC CULT: ABNORMAL
SERVICE CMNT-IMP: ABNORMAL
SPECIMEN DESCRIPTION: ABNORMAL

## 2024-10-02 RX ORDER — ATORVASTATIN CALCIUM 10 MG/1
10 TABLET, FILM COATED ORAL DAILY
Qty: 90 TABLET | Refills: 1 | Status: SHIPPED | OUTPATIENT
Start: 2024-10-02

## 2024-10-02 RX ORDER — METOPROLOL TARTRATE 50 MG
50 TABLET ORAL 2 TIMES DAILY
Qty: 180 TABLET | Refills: 1 | Status: SHIPPED | OUTPATIENT
Start: 2024-10-02

## 2024-10-02 RX ORDER — GABAPENTIN 800 MG/1
800 TABLET ORAL 2 TIMES DAILY
Qty: 180 TABLET | Refills: 0 | Status: SHIPPED | OUTPATIENT
Start: 2024-10-02 | End: 2024-12-31

## 2024-10-02 NOTE — TELEPHONE ENCOUNTER
Refill requests from [facility] for Metoprolol Tartrate 50 mg, Gabapentin 800 mg and Metformin 500 mg

## 2024-10-14 RX ORDER — GABAPENTIN 800 MG/1
800 TABLET ORAL 2 TIMES DAILY
Qty: 180 TABLET | Refills: 1 | OUTPATIENT
Start: 2024-10-14 | End: 2025-04-12

## 2024-10-16 ENCOUNTER — OFFICE VISIT (OUTPATIENT)
Dept: PRIMARY CARE CLINIC | Age: 74
End: 2024-10-16

## 2024-10-16 VITALS
TEMPERATURE: 95.1 F | SYSTOLIC BLOOD PRESSURE: 112 MMHG | BODY MASS INDEX: 27.02 KG/M2 | HEART RATE: 83 BPM | WEIGHT: 183 LBS | DIASTOLIC BLOOD PRESSURE: 80 MMHG | OXYGEN SATURATION: 98 %

## 2024-10-16 DIAGNOSIS — Z72.0 TOBACCO ABUSE: ICD-10-CM

## 2024-10-16 DIAGNOSIS — Z13.220 LIPID SCREENING: ICD-10-CM

## 2024-10-16 DIAGNOSIS — Z87.891 PERSONAL HISTORY OF TOBACCO USE: ICD-10-CM

## 2024-10-16 DIAGNOSIS — Z11.4 ENCOUNTER FOR SCREENING FOR HIV: ICD-10-CM

## 2024-10-16 DIAGNOSIS — I10 ESSENTIAL (PRIMARY) HYPERTENSION: Chronic | ICD-10-CM

## 2024-10-16 DIAGNOSIS — J84.115 RESPIRATORY BRONCHIOLITIS ASSOCIATED INTERSTITIAL LUNG DISEASE (HCC): ICD-10-CM

## 2024-10-16 DIAGNOSIS — I42.2 HYPERTROPHIC CARDIOMYOPATHY (HCC): ICD-10-CM

## 2024-10-16 DIAGNOSIS — M81.0 OSTEOPOROSIS, UNSPECIFIED OSTEOPOROSIS TYPE, UNSPECIFIED PATHOLOGICAL FRACTURE PRESENCE: ICD-10-CM

## 2024-10-16 DIAGNOSIS — G62.9 NEUROPATHY: ICD-10-CM

## 2024-10-16 DIAGNOSIS — E11.9 TYPE 2 DIABETES MELLITUS WITHOUT COMPLICATION, WITHOUT LONG-TERM CURRENT USE OF INSULIN (HCC): ICD-10-CM

## 2024-10-16 DIAGNOSIS — J44.9 CHRONIC OBSTRUCTIVE PULMONARY DISEASE, UNSPECIFIED COPD TYPE (HCC): ICD-10-CM

## 2024-10-16 DIAGNOSIS — Z11.59 NEED FOR HEPATITIS C SCREENING TEST: ICD-10-CM

## 2024-10-16 DIAGNOSIS — Z13.29 THYROID DISORDER SCREEN: ICD-10-CM

## 2024-10-16 DIAGNOSIS — K22.719 BARRETT'S ESOPHAGUS WITH DYSPLASIA: Primary | ICD-10-CM

## 2024-10-16 PROBLEM — M25.511 ACUTE PAIN OF RIGHT SHOULDER: Status: RESOLVED | Noted: 2023-06-29 | Resolved: 2024-10-16

## 2024-10-16 PROBLEM — H60.8X2: Status: RESOLVED | Noted: 2020-06-11 | Resolved: 2024-10-16

## 2024-10-16 PROBLEM — S02.5XXA FRACTURE OF TOOTH: Status: RESOLVED | Noted: 2021-03-16 | Resolved: 2024-10-16

## 2024-10-16 PROBLEM — R23.8 OTHER SKIN CHANGES: Status: RESOLVED | Noted: 2024-06-17 | Resolved: 2024-10-16

## 2024-10-16 PROBLEM — R10.13 EPIGASTRIC PAIN: Status: RESOLVED | Noted: 2021-08-31 | Resolved: 2024-10-16

## 2024-10-16 PROBLEM — R60.0 EDEMA OF FOOT: Status: RESOLVED | Noted: 2024-06-17 | Resolved: 2024-10-16

## 2024-10-16 RX ORDER — GABAPENTIN 800 MG/1
800 TABLET ORAL 2 TIMES DAILY
Qty: 180 TABLET | Refills: 0 | Status: CANCELLED | OUTPATIENT
Start: 2024-10-16 | End: 2025-01-14

## 2024-10-16 RX ORDER — GABAPENTIN 800 MG/1
800 TABLET ORAL 2 TIMES DAILY
Qty: 180 TABLET | Refills: 0 | Status: SHIPPED | OUTPATIENT
Start: 2024-10-16 | End: 2025-01-14

## 2024-10-16 SDOH — ECONOMIC STABILITY: INCOME INSECURITY: HOW HARD IS IT FOR YOU TO PAY FOR THE VERY BASICS LIKE FOOD, HOUSING, MEDICAL CARE, AND HEATING?: NOT HARD AT ALL

## 2024-10-16 SDOH — ECONOMIC STABILITY: FOOD INSECURITY: WITHIN THE PAST 12 MONTHS, THE FOOD YOU BOUGHT JUST DIDN'T LAST AND YOU DIDN'T HAVE MONEY TO GET MORE.: NEVER TRUE

## 2024-10-16 SDOH — ECONOMIC STABILITY: FOOD INSECURITY: WITHIN THE PAST 12 MONTHS, YOU WORRIED THAT YOUR FOOD WOULD RUN OUT BEFORE YOU GOT MONEY TO BUY MORE.: NEVER TRUE

## 2024-10-28 ENCOUNTER — TELEPHONE (OUTPATIENT)
Dept: PRIMARY CARE CLINIC | Age: 74
End: 2024-10-28

## 2024-10-28 NOTE — TELEPHONE ENCOUNTER
I recommend remaining well hydrated with water. [place] diet such as banana, rice, applesauce, toast. F/u in office as scheduled for further evaluation and treatment

## 2024-10-28 NOTE — TELEPHONE ENCOUNTER
Patient states she had been having nausea for 11 days where it puts her in bed. She states she has no other symptoms. Patient will schedule appt for Wednesday because she will be in town for her lung screening, but wants to know if there is something else she can do in the meantime.

## 2024-10-30 ENCOUNTER — HOSPITAL ENCOUNTER (OUTPATIENT)
Age: 74
Discharge: HOME | End: 2024-10-30
Payer: MEDICARE

## 2024-10-30 ENCOUNTER — HOSPITAL ENCOUNTER (OUTPATIENT)
Dept: CT IMAGING | Age: 74
Discharge: HOME | End: 2024-11-01
Payer: MEDICARE

## 2024-10-30 ENCOUNTER — OFFICE VISIT (OUTPATIENT)
Dept: PRIMARY CARE CLINIC | Age: 74
End: 2024-10-30

## 2024-10-30 VITALS
OXYGEN SATURATION: 97 % | TEMPERATURE: 96.8 F | WEIGHT: 181 LBS | DIASTOLIC BLOOD PRESSURE: 78 MMHG | SYSTOLIC BLOOD PRESSURE: 120 MMHG | HEART RATE: 71 BPM | BODY MASS INDEX: 26.73 KG/M2

## 2024-10-30 DIAGNOSIS — Z11.59 NEED FOR HEPATITIS C SCREENING TEST: ICD-10-CM

## 2024-10-30 DIAGNOSIS — R19.5 OCCULT BLOOD POSITIVE STOOL: ICD-10-CM

## 2024-10-30 DIAGNOSIS — Z72.0 TOBACCO ABUSE: ICD-10-CM

## 2024-10-30 DIAGNOSIS — M81.0 OSTEOPOROSIS, UNSPECIFIED OSTEOPOROSIS TYPE, UNSPECIFIED PATHOLOGICAL FRACTURE PRESENCE: ICD-10-CM

## 2024-10-30 DIAGNOSIS — R11.0 NAUSEA: ICD-10-CM

## 2024-10-30 DIAGNOSIS — R10.32 LLQ ABDOMINAL PAIN: ICD-10-CM

## 2024-10-30 DIAGNOSIS — E11.9 TYPE 2 DIABETES MELLITUS WITHOUT COMPLICATION, WITHOUT LONG-TERM CURRENT USE OF INSULIN (HCC): ICD-10-CM

## 2024-10-30 DIAGNOSIS — R10.32 LLQ ABDOMINAL PAIN: Primary | ICD-10-CM

## 2024-10-30 DIAGNOSIS — Z13.29 THYROID DISORDER SCREEN: ICD-10-CM

## 2024-10-30 DIAGNOSIS — I10 ESSENTIAL (PRIMARY) HYPERTENSION: Chronic | ICD-10-CM

## 2024-10-30 DIAGNOSIS — Z11.4 ENCOUNTER FOR SCREENING FOR HIV: ICD-10-CM

## 2024-10-30 DIAGNOSIS — Z13.220 LIPID SCREENING: ICD-10-CM

## 2024-10-30 LAB
25(OH)D3 SERPL-MCNC: 40 NG/ML (ref 30–100)
ALT SERPL-CCNC: 10 U/L (ref 10–35)
ANION GAP SERPL CALCULATED.3IONS-SCNC: 12 MMOL/L (ref 9–16)
AST SERPL-CCNC: 15 U/L (ref 10–35)
BACTERIA URNS QL MICRO: ABNORMAL
BILIRUB UR QL STRIP: NEGATIVE
BUN SERPL-MCNC: 11 MG/DL (ref 8–23)
BUN/CREAT SERPL: 16 (ref 9–20)
CALCIUM SERPL-MCNC: 9.7 MG/DL (ref 8.6–10.4)
CHLORIDE SERPL-SCNC: 98 MMOL/L (ref 98–107)
CHOLEST SERPL-MCNC: 99 MG/DL (ref 0–199)
CHOLESTEROL/HDL RATIO: 2.6
CLARITY UR: ABNORMAL
CO2 SERPL-SCNC: 30 MMOL/L (ref 20–31)
COLOR UR: YELLOW
CREAT SERPL-MCNC: 0.7 MG/DL (ref 0.5–0.9)
EPI CELLS #/AREA URNS HPF: ABNORMAL /HPF (ref 0–25)
ERYTHROCYTE [DISTWIDTH] IN BLOOD BY AUTOMATED COUNT: 13.9 % (ref 11.8–14.4)
EST. AVERAGE GLUCOSE BLD GHB EST-MCNC: 154 MG/DL
GFR, ESTIMATED: 86 ML/MIN/1.73M2
GLUCOSE SERPL-MCNC: 130 MG/DL (ref 74–99)
GLUCOSE UR STRIP-MCNC: NEGATIVE MG/DL
HBA1C MFR BLD: 7 % (ref 4–6)
HCT VFR BLD AUTO: 44.5 % (ref 36.3–47.1)
HCV AB SERPL QL IA: NONREACTIVE
HDLC SERPL-MCNC: 38 MG/DL
HGB BLD-MCNC: 14.1 G/DL (ref 11.9–15.1)
HGB UR QL STRIP.AUTO: ABNORMAL
HIV 1+2 AB+HIV1 P24 AG SERPL QL IA: NONREACTIVE
KETONES UR STRIP-MCNC: NEGATIVE MG/DL
LDLC SERPL CALC-MCNC: 44 MG/DL (ref 0–100)
LEUKOCYTE ESTERASE UR QL STRIP: ABNORMAL
LIPASE SERPL-CCNC: 17 U/L (ref 13–60)
MCH RBC QN AUTO: 27.4 PG (ref 25.2–33.5)
MCHC RBC AUTO-ENTMCNC: 31.7 G/DL (ref 28.4–34.8)
MCV RBC AUTO: 86.4 FL (ref 82.6–102.9)
NITRITE UR QL STRIP: POSITIVE
NRBC BLD-RTO: 0 PER 100 WBC
PH UR STRIP: 7 [PH] (ref 5–9)
PLATELET # BLD AUTO: 251 K/UL (ref 138–453)
PMV BLD AUTO: 10.6 FL (ref 8.1–13.5)
POTASSIUM SERPL-SCNC: 3.2 MMOL/L (ref 3.7–5.3)
PROT UR STRIP-MCNC: NEGATIVE MG/DL
RBC # BLD AUTO: 5.15 M/UL (ref 3.95–5.11)
RBC #/AREA URNS HPF: ABNORMAL /HPF (ref 0–2)
SODIUM SERPL-SCNC: 140 MMOL/L (ref 136–145)
SP GR UR STRIP: 1.01 (ref 1.01–1.02)
TRIGL SERPL-MCNC: 83 MG/DL
TSH SERPL DL<=0.05 MIU/L-ACNC: 1.81 UIU/ML (ref 0.27–4.2)
UROBILINOGEN UR STRIP-ACNC: NORMAL EU/DL (ref 0–1)
VLDLC SERPL CALC-MCNC: 17 MG/DL (ref 1–30)
WBC #/AREA URNS HPF: ABNORMAL /HPF (ref 0–5)
WBC OTHER # BLD: 7.1 K/UL (ref 3.5–11.3)

## 2024-10-30 PROCEDURE — 87389 HIV-1 AG W/HIV-1&-2 AB AG IA: CPT

## 2024-10-30 PROCEDURE — 84450 TRANSFERASE (AST) (SGOT): CPT

## 2024-10-30 PROCEDURE — 74177 CT ABD & PELVIS W/CONTRAST: CPT

## 2024-10-30 PROCEDURE — 82306 VITAMIN D 25 HYDROXY: CPT

## 2024-10-30 PROCEDURE — 87086 URINE CULTURE/COLONY COUNT: CPT

## 2024-10-30 PROCEDURE — 85027 COMPLETE CBC AUTOMATED: CPT

## 2024-10-30 PROCEDURE — 71271 CT THORAX LUNG CANCER SCR C-: CPT

## 2024-10-30 PROCEDURE — 84443 ASSAY THYROID STIM HORMONE: CPT

## 2024-10-30 PROCEDURE — 87186 SC STD MICRODIL/AGAR DIL: CPT

## 2024-10-30 PROCEDURE — 6360000004 HC RX CONTRAST MEDICATION: Performed by: NURSE PRACTITIONER

## 2024-10-30 PROCEDURE — 84460 ALANINE AMINO (ALT) (SGPT): CPT

## 2024-10-30 PROCEDURE — 80048 BASIC METABOLIC PNL TOTAL CA: CPT

## 2024-10-30 PROCEDURE — 86803 HEPATITIS C AB TEST: CPT

## 2024-10-30 PROCEDURE — 87088 URINE BACTERIA CULTURE: CPT

## 2024-10-30 PROCEDURE — 83690 ASSAY OF LIPASE: CPT

## 2024-10-30 PROCEDURE — 80061 LIPID PANEL: CPT

## 2024-10-30 PROCEDURE — 81001 URINALYSIS AUTO W/SCOPE: CPT

## 2024-10-30 PROCEDURE — 83036 HEMOGLOBIN GLYCOSYLATED A1C: CPT

## 2024-10-30 RX ORDER — IOPAMIDOL 755 MG/ML
75 INJECTION, SOLUTION INTRAVASCULAR
Status: COMPLETED | OUTPATIENT
Start: 2024-10-30 | End: 2024-10-30

## 2024-10-30 RX ORDER — GABAPENTIN 800 MG/1
800 TABLET ORAL 2 TIMES DAILY
Qty: 180 TABLET | Refills: 0 | Status: CANCELLED | OUTPATIENT
Start: 2024-10-30 | End: 2025-01-28

## 2024-10-30 RX ADMIN — IOPAMIDOL 75 ML: 755 INJECTION, SOLUTION INTRAVENOUS at 12:10

## 2024-10-30 NOTE — PROGRESS NOTES
effort is normal. No respiratory distress.      Breath sounds: Normal breath sounds. No wheezing or rales.   Abdominal:      Palpations: Abdomen is soft.      Tenderness: There is abdominal tenderness (generalized tenderness to palpation left mid-abdomen, LLQ, and generalized lower abdomen).      Comments: Hypoactive bowel sounds in all 4 quadrants    Neurological:      Mental Status: She is alert.   Psychiatric:         Mood and Affect: Mood normal.         Behavior: Behavior normal.         Thought Content: Thought content normal.         Judgment: Judgment normal.         Data:     Lab Results   Component Value Date/Time     10/25/2023 08:35 AM    K 3.6 10/25/2023 08:35 AM     10/25/2023 08:35 AM    CO2 29 10/25/2023 08:35 AM    BUN 17 10/25/2023 08:35 AM    CREATININE 0.7 10/25/2023 08:35 AM    GLUCOSE 143 10/25/2023 08:35 AM    BILITOT 0.6 10/25/2023 08:35 AM    ALKPHOS 90 10/25/2023 08:35 AM    AST 12 10/25/2023 08:35 AM    ALT 9 10/25/2023 08:35 AM     Lab Results   Component Value Date/Time    WBC 6.3 10/25/2023 08:35 AM    RBC 4.84 10/25/2023 08:35 AM    HGB 13.4 10/25/2023 08:35 AM    HCT 44.3 10/25/2023 08:35 AM    MCV 91.5 10/25/2023 08:35 AM    MCH 27.7 10/25/2023 08:35 AM    MCHC 30.2 10/25/2023 08:35 AM    RDW 13.9 10/25/2023 08:35 AM     10/25/2023 08:35 AM    MPV 11.0 10/25/2023 08:35 AM     Lab Results   Component Value Date/Time    TSH 2.65 10/25/2023 08:35 AM     Lab Results   Component Value Date/Time    CHOL 113 10/25/2023 08:35 AM    LDL 45 10/25/2023 08:35 AM    HDL 55 10/25/2023 08:35 AM    LABA1C 6.3 10/25/2023 08:35 AM       Assessment/Plan:      Diagnosis Orders   1. LLQ abdominal pain  CT ABDOMEN PELVIS W IV CONTRAST Additional Contrast? Oral    Lipase    Urinalysis with Reflex to Culture      2. Nausea [R11.0]  CT ABDOMEN PELVIS W IV CONTRAST Additional Contrast? Oral      3. Occult blood positive stool          - Discussed my concern with her current symptoms.  She

## 2024-10-31 ENCOUNTER — OFFICE VISIT (OUTPATIENT)
Dept: UROLOGY | Age: 74
End: 2024-10-31

## 2024-10-31 VITALS
HEIGHT: 69 IN | TEMPERATURE: 98.6 F | SYSTOLIC BLOOD PRESSURE: 104 MMHG | BODY MASS INDEX: 26.66 KG/M2 | WEIGHT: 180 LBS | DIASTOLIC BLOOD PRESSURE: 60 MMHG

## 2024-10-31 DIAGNOSIS — N20.0 RENAL CALCULI: Primary | ICD-10-CM

## 2024-10-31 NOTE — PROGRESS NOTES
HPI:        Patient is a 74 y.o. female in no acute distress.  She is alert and oriented to person, place, and time.        Patient is being seen here today as a new patient.  Patient was referred to us by MURRAY Nevarez.  Patient was referred to us for renal calculus.  Patient did get a recent CT scan.  This film was independently reviewed today.  This does show multiple stones in the left kidney.  Patient does have a large cyst in the left kidney as well.  I did review her films from 2 years ago CT scan.  This did show that the cyst and the stones were there.  The stones do appear to be larger in size the cyst does appear to be stable in size.  No pain today..  Patient does have a stone history.  She has had visits before.  But never has had any stone surgery.  She has never seen urology in the past    Past Medical History:   Diagnosis Date    Acute pain of right shoulder 06/29/2023    Arthritis     Crespo's esophagus     Chronic obstructive pulmonary disease, unspecified COPD type (HCC) 10/16/2024    Diabetes mellitus (HCC)     Epigastric pain 08/31/2021    Hypertension     Other skin changes 06/17/2024     Past Surgical History:   Procedure Laterality Date    APPENDECTOMY  1969    BACK SURGERY  1994    upper disc    BACK SURGERY  2019    protruded disc    CHOLECYSTECTOMY      1988    COLONOSCOPY  05/2017    COLONOSCOPY  08/31/2021    Dr. Richardson - normal colonoscopy    COLONOSCOPY N/A 8/31/2021    COLORECTAL CANCER SCREENING, NOT HIGH RISK performed by Misael Richardson MD at [facility] OR    CYST REMOVAL  1965    pilonidal    ENDOSCOPY, COLON, DIAGNOSTIC  07/2019    GASTRIC FUNDOPLICATION  2006    HYSTERECTOMY (CERVIX STATUS UNKNOWN)  1998    still has ovaries    JOINT REPLACEMENT Left 2015    HIP    JOINT REPLACEMENT Right     HIP    KNEE ARTHROSCOPY Bilateral 2000    LUMBAR FUSION N/A 01/18/2019    HARDWARE REMOVAL L4-S1, L3-4 DECOMPRESSION AND FUSION EXTENSION TO L3 performed by Tali Ramsay MD at

## 2024-11-01 LAB
MICROORGANISM SPEC CULT: ABNORMAL
SPECIMEN DESCRIPTION: ABNORMAL

## 2024-11-03 ENCOUNTER — HOSPITAL ENCOUNTER (OUTPATIENT)
Age: 74
Setting detail: OBSERVATION
Discharge: HOME | End: 2024-11-04
Attending: STUDENT IN AN ORGANIZED HEALTH CARE EDUCATION/TRAINING PROGRAM | Admitting: STUDENT IN AN ORGANIZED HEALTH CARE EDUCATION/TRAINING PROGRAM
Payer: MEDICARE

## 2024-11-03 DIAGNOSIS — E86.0 DEHYDRATION, MODERATE: Primary | ICD-10-CM

## 2024-11-03 DIAGNOSIS — E87.6 HYPOKALEMIA: ICD-10-CM

## 2024-11-03 PROBLEM — Z87.440 RECENT URINARY TRACT INFECTION: Status: ACTIVE | Noted: 2024-11-03

## 2024-11-03 LAB
ALBUMIN SERPL-MCNC: 3.4 G/DL (ref 3.5–5.2)
ALBUMIN/GLOB SERPL: 0.9 {RATIO} (ref 1–2.5)
ALP SERPL-CCNC: 173 U/L (ref 35–104)
ALT SERPL-CCNC: 146 U/L (ref 10–35)
ANION GAP SERPL CALCULATED.3IONS-SCNC: 15 MMOL/L (ref 9–16)
AST SERPL-CCNC: 91 U/L (ref 10–35)
BACTERIA URNS QL MICRO: ABNORMAL
BASOPHILS # BLD: 0 K/UL (ref 0–0.2)
BASOPHILS NFR BLD: 0 % (ref 0–2)
BILIRUB SERPL-MCNC: 1 MG/DL (ref 0–1.2)
BILIRUB UR QL STRIP: ABNORMAL
BUN SERPL-MCNC: 28 MG/DL (ref 8–23)
BUN/CREAT SERPL: 28 (ref 9–20)
CALCIUM SERPL-MCNC: 8.6 MG/DL (ref 8.6–10.4)
CHLORIDE SERPL-SCNC: 93 MMOL/L (ref 98–107)
CLARITY UR: CLEAR
CO2 SERPL-SCNC: 26 MMOL/L (ref 20–31)
COLOR UR: YELLOW
CREAT SERPL-MCNC: 1 MG/DL (ref 0.5–0.9)
EKG ATRIAL RATE: 92 BPM
EKG P-R INTERVAL: 176 MS
EKG Q-T INTERVAL: 330 MS
EKG QRS DURATION: 94 MS
EKG QTC CALCULATION (BAZETT): 408 MS
EKG R AXIS: 104 DEGREES
EKG T AXIS: -16 DEGREES
EKG VENTRICULAR RATE: 92 BPM
EOSINOPHIL # BLD: 0.1 K/UL (ref 0–0.44)
EOSINOPHILS RELATIVE PERCENT: 2 % (ref 1–4)
EPI CELLS #/AREA URNS HPF: ABNORMAL /HPF (ref 0–25)
ERYTHROCYTE [DISTWIDTH] IN BLOOD BY AUTOMATED COUNT: 13.6 % (ref 11.8–14.4)
GFR, ESTIMATED: 58 ML/MIN/1.73M2
GLUCOSE BLD-MCNC: 122 MG/DL (ref 74–100)
GLUCOSE SERPL-MCNC: 142 MG/DL (ref 74–99)
GLUCOSE UR STRIP-MCNC: NEGATIVE MG/DL
HCT VFR BLD AUTO: 43.1 % (ref 36.3–47.1)
HGB BLD-MCNC: 14 G/DL (ref 11.9–15.1)
HGB UR QL STRIP.AUTO: NEGATIVE
IMM GRANULOCYTES # BLD AUTO: 0.05 K/UL (ref 0–0.3)
IMM GRANULOCYTES NFR BLD: 1 %
KETONES UR STRIP-MCNC: NEGATIVE MG/DL
LACTATE BLDV-SCNC: 2.4 MMOL/L (ref 0.5–2.2)
LEUKOCYTE ESTERASE UR QL STRIP: NEGATIVE
LIPASE SERPL-CCNC: 12 U/L (ref 13–60)
LYMPHOCYTES NFR BLD: 0.14 K/UL (ref 1.1–3.7)
LYMPHOCYTES RELATIVE PERCENT: 3 % (ref 24–43)
MCH RBC QN AUTO: 27.3 PG (ref 25.2–33.5)
MCHC RBC AUTO-ENTMCNC: 32.5 G/DL (ref 28.4–34.8)
MCV RBC AUTO: 84 FL (ref 82.6–102.9)
MONOCYTES NFR BLD: 0.24 K/UL (ref 0.1–1.2)
MONOCYTES NFR BLD: 5 % (ref 3–12)
MORPHOLOGY: NORMAL
NEUTROPHILS NFR BLD: 89 % (ref 36–65)
NEUTS SEG NFR BLD: 4.27 K/UL (ref 1.5–8.1)
NITRITE UR QL STRIP: NEGATIVE
NRBC BLD-RTO: 0 PER 100 WBC
PH UR STRIP: 6 [PH] (ref 5–9)
PLATELET # BLD AUTO: 155 K/UL (ref 138–453)
PMV BLD AUTO: 11.7 FL (ref 8.1–13.5)
POTASSIUM SERPL-SCNC: 2.9 MMOL/L (ref 3.7–5.3)
PROT SERPL-MCNC: 7.1 G/DL (ref 6.6–8.7)
PROT UR STRIP-MCNC: ABNORMAL MG/DL
RBC # BLD AUTO: 5.13 M/UL (ref 3.95–5.11)
RBC #/AREA URNS HPF: ABNORMAL /HPF (ref 0–2)
SODIUM SERPL-SCNC: 134 MMOL/L (ref 136–145)
SP GR UR STRIP: 1.02 (ref 1.01–1.02)
TROPONIN I SERPL HS-MCNC: 20 NG/L (ref 0–14)
UROBILINOGEN UR STRIP-ACNC: NORMAL EU/DL (ref 0–1)
WBC #/AREA URNS HPF: ABNORMAL /HPF (ref 0–5)
WBC OTHER # BLD: 4.8 K/UL (ref 3.5–11.3)

## 2024-11-03 PROCEDURE — 82947 ASSAY GLUCOSE BLOOD QUANT: CPT

## 2024-11-03 PROCEDURE — 96365 THER/PROPH/DIAG IV INF INIT: CPT

## 2024-11-03 PROCEDURE — 6360000002 HC RX W HCPCS: Performed by: STUDENT IN AN ORGANIZED HEALTH CARE EDUCATION/TRAINING PROGRAM

## 2024-11-03 PROCEDURE — 6370000000 HC RX 637 (ALT 250 FOR IP): Performed by: STUDENT IN AN ORGANIZED HEALTH CARE EDUCATION/TRAINING PROGRAM

## 2024-11-03 PROCEDURE — 85025 COMPLETE CBC W/AUTO DIFF WBC: CPT

## 2024-11-03 PROCEDURE — 6360000002 HC RX W HCPCS: Performed by: PHYSICIAN ASSISTANT

## 2024-11-03 PROCEDURE — 96374 THER/PROPH/DIAG INJ IV PUSH: CPT

## 2024-11-03 PROCEDURE — G0378 HOSPITAL OBSERVATION PER HR: HCPCS

## 2024-11-03 PROCEDURE — 96366 THER/PROPH/DIAG IV INF ADDON: CPT

## 2024-11-03 PROCEDURE — 96361 HYDRATE IV INFUSION ADD-ON: CPT

## 2024-11-03 PROCEDURE — 83605 ASSAY OF LACTIC ACID: CPT

## 2024-11-03 PROCEDURE — 93010 ELECTROCARDIOGRAM REPORT: CPT | Performed by: INTERNAL MEDICINE

## 2024-11-03 PROCEDURE — 94664 DEMO&/EVAL PT USE INHALER: CPT

## 2024-11-03 PROCEDURE — 84484 ASSAY OF TROPONIN QUANT: CPT

## 2024-11-03 PROCEDURE — 2580000003 HC RX 258: Performed by: PHYSICIAN ASSISTANT

## 2024-11-03 PROCEDURE — 83690 ASSAY OF LIPASE: CPT

## 2024-11-03 PROCEDURE — 96372 THER/PROPH/DIAG INJ SC/IM: CPT

## 2024-11-03 PROCEDURE — 81001 URINALYSIS AUTO W/SCOPE: CPT

## 2024-11-03 PROCEDURE — 99285 EMERGENCY DEPT VISIT HI MDM: CPT

## 2024-11-03 PROCEDURE — 94761 N-INVAS EAR/PLS OXIMETRY MLT: CPT

## 2024-11-03 PROCEDURE — 93005 ELECTROCARDIOGRAM TRACING: CPT | Performed by: PHYSICIAN ASSISTANT

## 2024-11-03 PROCEDURE — 80053 COMPREHEN METABOLIC PANEL: CPT

## 2024-11-03 PROCEDURE — 87086 URINE CULTURE/COLONY COUNT: CPT

## 2024-11-03 PROCEDURE — 2500000003 HC RX 250 WO HCPCS: Performed by: STUDENT IN AN ORGANIZED HEALTH CARE EDUCATION/TRAINING PROGRAM

## 2024-11-03 PROCEDURE — 96375 TX/PRO/DX INJ NEW DRUG ADDON: CPT

## 2024-11-03 RX ORDER — ENOXAPARIN SODIUM 100 MG/ML
40 INJECTION SUBCUTANEOUS DAILY
Status: DISCONTINUED | OUTPATIENT
Start: 2024-11-03 | End: 2024-11-04 | Stop reason: HOSPADM

## 2024-11-03 RX ORDER — POTASSIUM CHLORIDE 7.45 MG/ML
10 INJECTION INTRAVENOUS ONCE
Status: COMPLETED | OUTPATIENT
Start: 2024-11-03 | End: 2024-11-04

## 2024-11-03 RX ORDER — MAGNESIUM SULFATE IN WATER 40 MG/ML
2000 INJECTION, SOLUTION INTRAVENOUS PRN
Status: DISCONTINUED | OUTPATIENT
Start: 2024-11-03 | End: 2024-11-04 | Stop reason: HOSPADM

## 2024-11-03 RX ORDER — VITAMIN B COMPLEX
1000 TABLET ORAL DAILY
Status: DISCONTINUED | OUTPATIENT
Start: 2024-11-03 | End: 2024-11-04 | Stop reason: HOSPADM

## 2024-11-03 RX ORDER — ONDANSETRON 4 MG/1
4 TABLET, ORALLY DISINTEGRATING ORAL EVERY 8 HOURS PRN
Status: DISCONTINUED | OUTPATIENT
Start: 2024-11-03 | End: 2024-11-04 | Stop reason: HOSPADM

## 2024-11-03 RX ORDER — DEXTROSE MONOHYDRATE 100 MG/ML
INJECTION, SOLUTION INTRAVENOUS CONTINUOUS PRN
Status: DISCONTINUED | OUTPATIENT
Start: 2024-11-03 | End: 2024-11-04 | Stop reason: HOSPADM

## 2024-11-03 RX ORDER — GLUCAGON 1 MG/ML
1 KIT INJECTION PRN
Status: DISCONTINUED | OUTPATIENT
Start: 2024-11-03 | End: 2024-11-04 | Stop reason: HOSPADM

## 2024-11-03 RX ORDER — POTASSIUM CHLORIDE 7.45 MG/ML
10 INJECTION INTRAVENOUS PRN
Status: DISCONTINUED | OUTPATIENT
Start: 2024-11-03 | End: 2024-11-04 | Stop reason: HOSPADM

## 2024-11-03 RX ORDER — LANOLIN ALCOHOL/MO/W.PET/CERES
1000 CREAM (GRAM) TOPICAL DAILY
Status: DISCONTINUED | OUTPATIENT
Start: 2024-11-03 | End: 2024-11-04 | Stop reason: HOSPADM

## 2024-11-03 RX ORDER — SODIUM CHLORIDE 0.9 % (FLUSH) 0.9 %
10 SYRINGE (ML) INJECTION EVERY 12 HOURS SCHEDULED
Status: DISCONTINUED | OUTPATIENT
Start: 2024-11-03 | End: 2024-11-04 | Stop reason: HOSPADM

## 2024-11-03 RX ORDER — ACETAMINOPHEN 325 MG/1
650 TABLET ORAL EVERY 6 HOURS PRN
Status: DISCONTINUED | OUTPATIENT
Start: 2024-11-03 | End: 2024-11-03

## 2024-11-03 RX ORDER — INSULIN LISPRO 100 [IU]/ML
0-4 INJECTION, SOLUTION INTRAVENOUS; SUBCUTANEOUS
Status: DISCONTINUED | OUTPATIENT
Start: 2024-11-03 | End: 2024-11-04 | Stop reason: HOSPADM

## 2024-11-03 RX ORDER — ONDANSETRON 2 MG/ML
4 INJECTION INTRAMUSCULAR; INTRAVENOUS EVERY 6 HOURS PRN
Status: DISCONTINUED | OUTPATIENT
Start: 2024-11-03 | End: 2024-11-04 | Stop reason: HOSPADM

## 2024-11-03 RX ORDER — ONDANSETRON 2 MG/ML
4 INJECTION INTRAMUSCULAR; INTRAVENOUS EVERY 6 HOURS PRN
Status: DISCONTINUED | OUTPATIENT
Start: 2024-11-03 | End: 2024-11-03

## 2024-11-03 RX ORDER — SODIUM CHLORIDE 9 MG/ML
INJECTION, SOLUTION INTRAVENOUS PRN
Status: DISCONTINUED | OUTPATIENT
Start: 2024-11-03 | End: 2024-11-04 | Stop reason: HOSPADM

## 2024-11-03 RX ORDER — POLYETHYLENE GLYCOL 3350 17 G/17G
17 POWDER, FOR SOLUTION ORAL DAILY PRN
Status: DISCONTINUED | OUTPATIENT
Start: 2024-11-03 | End: 2024-11-04 | Stop reason: HOSPADM

## 2024-11-03 RX ORDER — DEXTROSE MONOHYDRATE, SODIUM CHLORIDE, AND POTASSIUM CHLORIDE 50; 1.49; 4.5 G/1000ML; G/1000ML; G/1000ML
INJECTION, SOLUTION INTRAVENOUS CONTINUOUS
Status: DISPENSED | OUTPATIENT
Start: 2024-11-03 | End: 2024-11-04

## 2024-11-03 RX ORDER — SODIUM CHLORIDE 0.9 % (FLUSH) 0.9 %
10 SYRINGE (ML) INJECTION PRN
Status: DISCONTINUED | OUTPATIENT
Start: 2024-11-03 | End: 2024-11-04 | Stop reason: HOSPADM

## 2024-11-03 RX ORDER — ACETAMINOPHEN 650 MG/1
650 SUPPOSITORY RECTAL EVERY 6 HOURS PRN
Status: DISCONTINUED | OUTPATIENT
Start: 2024-11-03 | End: 2024-11-03

## 2024-11-03 RX ORDER — GABAPENTIN 400 MG/1
800 CAPSULE ORAL 2 TIMES DAILY
Status: DISCONTINUED | OUTPATIENT
Start: 2024-11-03 | End: 2024-11-04 | Stop reason: HOSPADM

## 2024-11-03 RX ORDER — 0.9 % SODIUM CHLORIDE 0.9 %
1000 INTRAVENOUS SOLUTION INTRAVENOUS ONCE
Status: COMPLETED | OUTPATIENT
Start: 2024-11-03 | End: 2024-11-03

## 2024-11-03 RX ORDER — ASPIRIN 81 MG/1
81 TABLET ORAL DAILY
Status: DISCONTINUED | OUTPATIENT
Start: 2024-11-03 | End: 2024-11-04 | Stop reason: HOSPADM

## 2024-11-03 RX ORDER — POTASSIUM CHLORIDE 1500 MG/1
40 TABLET, EXTENDED RELEASE ORAL PRN
Status: DISCONTINUED | OUTPATIENT
Start: 2024-11-03 | End: 2024-11-04 | Stop reason: HOSPADM

## 2024-11-03 RX ORDER — ALBUTEROL SULFATE 90 UG/1
2 INHALANT RESPIRATORY (INHALATION) 4 TIMES DAILY PRN
Status: DISCONTINUED | OUTPATIENT
Start: 2024-11-03 | End: 2024-11-04 | Stop reason: HOSPADM

## 2024-11-03 RX ORDER — ONDANSETRON 2 MG/ML
4 INJECTION INTRAMUSCULAR; INTRAVENOUS ONCE
Status: COMPLETED | OUTPATIENT
Start: 2024-11-03 | End: 2024-11-03

## 2024-11-03 RX ORDER — SODIUM CHLORIDE 9 MG/ML
INJECTION, SOLUTION INTRAVENOUS CONTINUOUS
Status: DISCONTINUED | OUTPATIENT
Start: 2024-11-03 | End: 2024-11-03

## 2024-11-03 RX ORDER — ATORVASTATIN CALCIUM 10 MG/1
10 TABLET, FILM COATED ORAL DAILY
Status: DISCONTINUED | OUTPATIENT
Start: 2024-11-03 | End: 2024-11-04 | Stop reason: HOSPADM

## 2024-11-03 RX ORDER — METOPROLOL TARTRATE 50 MG
50 TABLET ORAL 2 TIMES DAILY
Status: DISCONTINUED | OUTPATIENT
Start: 2024-11-03 | End: 2024-11-04

## 2024-11-03 RX ORDER — SODIUM CHLORIDE 0.9 % (FLUSH) 0.9 %
3 SYRINGE (ML) INJECTION EVERY 8 HOURS
Status: DISCONTINUED | OUTPATIENT
Start: 2024-11-03 | End: 2024-11-03

## 2024-11-03 RX ORDER — ACETAMINOPHEN 500 MG
1000 TABLET ORAL EVERY 6 HOURS PRN
Status: DISCONTINUED | OUTPATIENT
Start: 2024-11-03 | End: 2024-11-04 | Stop reason: HOSPADM

## 2024-11-03 RX ADMIN — SODIUM CHLORIDE: 9 INJECTION, SOLUTION INTRAVENOUS at 17:19

## 2024-11-03 RX ADMIN — POTASSIUM CHLORIDE 10 MEQ: 7.46 INJECTION, SOLUTION INTRAVENOUS at 17:20

## 2024-11-03 RX ADMIN — ONDANSETRON 4 MG: 2 INJECTION, SOLUTION INTRAMUSCULAR; INTRAVENOUS at 16:29

## 2024-11-03 RX ADMIN — SODIUM CHLORIDE, PRESERVATIVE FREE 3 ML: 5 INJECTION INTRAVENOUS at 15:49

## 2024-11-03 RX ADMIN — ENOXAPARIN SODIUM 40 MG: 100 INJECTION SUBCUTANEOUS at 18:57

## 2024-11-03 RX ADMIN — POTASSIUM CHLORIDE 10 MEQ: 7.45 INJECTION INTRAVENOUS at 23:11

## 2024-11-03 RX ADMIN — POTASSIUM CHLORIDE 10 MEQ: 7.45 INJECTION INTRAVENOUS at 18:55

## 2024-11-03 RX ADMIN — Medication 1000 UNITS: at 18:58

## 2024-11-03 RX ADMIN — GABAPENTIN 800 MG: 400 CAPSULE ORAL at 20:08

## 2024-11-03 RX ADMIN — ASPIRIN 81 MG: 81 TABLET, COATED ORAL at 18:58

## 2024-11-03 RX ADMIN — SODIUM CHLORIDE 1000 ML: 9 INJECTION, SOLUTION INTRAVENOUS at 15:55

## 2024-11-03 RX ADMIN — CYANOCOBALAMIN TAB 1000 MCG 1000 MCG: 1000 TAB at 18:58

## 2024-11-03 RX ADMIN — ACETAMINOPHEN 1000 MG: 500 TABLET ORAL at 20:07

## 2024-11-03 RX ADMIN — POTASSIUM CHLORIDE, DEXTROSE MONOHYDRATE AND SODIUM CHLORIDE: 150; 5; 450 INJECTION, SOLUTION INTRAVENOUS at 20:09

## 2024-11-03 RX ADMIN — POTASSIUM CHLORIDE 10 MEQ: 7.45 INJECTION INTRAVENOUS at 21:03

## 2024-11-03 RX ADMIN — POTASSIUM CHLORIDE 10 MEQ: 7.45 INJECTION INTRAVENOUS at 22:08

## 2024-11-03 RX ADMIN — ATORVASTATIN CALCIUM 10 MG: 10 TABLET, FILM COATED ORAL at 18:58

## 2024-11-03 RX ADMIN — POTASSIUM CHLORIDE 10 MEQ: 7.45 INJECTION INTRAVENOUS at 19:56

## 2024-11-03 ASSESSMENT — PAIN DESCRIPTION - PAIN TYPE
TYPE: CHRONIC PAIN
TYPE: CHRONIC PAIN

## 2024-11-03 ASSESSMENT — PAIN DESCRIPTION - ORIENTATION
ORIENTATION: LOWER
ORIENTATION: LOWER;MID
ORIENTATION: LOWER

## 2024-11-03 ASSESSMENT — PAIN DESCRIPTION - DESCRIPTORS
DESCRIPTORS: DISCOMFORT

## 2024-11-03 ASSESSMENT — PAIN SCALES - GENERAL
PAINLEVEL_OUTOF10: 7
PAINLEVEL_OUTOF10: 8
PAINLEVEL_OUTOF10: 7

## 2024-11-03 ASSESSMENT — PAIN DESCRIPTION - LOCATION
LOCATION: ABDOMEN

## 2024-11-03 ASSESSMENT — PAIN - FUNCTIONAL ASSESSMENT
PAIN_FUNCTIONAL_ASSESSMENT: ACTIVITIES ARE NOT PREVENTED
PAIN_FUNCTIONAL_ASSESSMENT: 0-10
PAIN_FUNCTIONAL_ASSESSMENT: ACTIVITIES ARE NOT PREVENTED

## 2024-11-03 ASSESSMENT — PAIN DESCRIPTION - ONSET
ONSET: ON-GOING
ONSET: ON-GOING

## 2024-11-03 ASSESSMENT — PAIN DESCRIPTION - FREQUENCY
FREQUENCY: CONTINUOUS
FREQUENCY: CONTINUOUS

## 2024-11-03 NOTE — PLAN OF CARE
Problem: Safety - Adult  Goal: Free from fall injury  Outcome: Progressing     Problem: Chronic Conditions and Co-morbidities  Goal: Patient's chronic conditions and co-morbidity symptoms are monitored and maintained or improved  Outcome: Progressing     Problem: Discharge Planning  Goal: Discharge to home or other facility with appropriate resources  Outcome: Progressing     Problem: Pain  Goal: Verbalizes/displays adequate comfort level or baseline comfort level  Outcome: Progressing     Problem: Skin/Tissue Integrity  Goal: Absence of new skin breakdown  Outcome: Progressing

## 2024-11-03 NOTE — FLOWSHEET NOTE
74 year old female admitted to room 330 per stretcher from ER. To bed with assist. Oriented to room and call light system. Call light within reach. Continue to monitor

## 2024-11-03 NOTE — ED PROVIDER NOTES
METABOLIC PANEL - Abnormal; Notable for the following components:    Sodium 134 (*)     Potassium 2.9 (*)     Chloride 93 (*)     Glucose 142 (*)     BUN 28 (*)     Creatinine 1.0 (*)     Est, Glom Filt Rate 58 (*)     BUN/Creatinine Ratio 28 (*)     Albumin 3.4 (*)     Albumin/Globulin Ratio 0.9 (*)     Alkaline Phosphatase 173 (*)      (*)     AST 91 (*)     All other components within normal limits   LIPASE - Abnormal; Notable for the following components:    Lipase 12 (*)     All other components within normal limits   LACTIC ACID - Abnormal; Notable for the following components:    Lactic Acid 2.4 (*)     All other components within normal limits   TROPONIN - Abnormal; Notable for the following components:    Troponin, High Sensitivity 20 (*)     All other components within normal limits   GLUCOSE, WHOLE BLOOD - Abnormal; Notable for the following components:    POC Glucose 122 (*)     All other components within normal limits   CULTURE, URINE   URINALYSIS WITH MICROSCOPIC   CBC WITH AUTO DIFFERENTIAL   COMPREHENSIVE METABOLIC PANEL W/ REFLEX TO MG FOR LOW K   POCT GLUCOSE   POCT GLUCOSE       All other labs were within normal range or not returned as of this dictation.    EMERGENCY DEPARTMENT COURSE and DIFFERENTIAL DIAGNOSIS/MDM:   Vitals:    Vitals:    11/03/24 1712 11/03/24 1715 11/03/24 1730 11/03/24 1853   BP: (!) 115/40 (!) 101/43 115/67 (!) 101/46   Pulse:   79 68   Resp:   18 18   Temp:   97.8 °F (36.6 °C) 96.8 °F (36 °C)   TempSrc:   Temporal Temporal   SpO2: 95% 95% 96% 96%   Weight:   83 kg (183 lb)    Height:   1.753 m (5' 9\")            Medical Decision Making  Amount and/or Complexity of Data Reviewed  Labs: ordered.  ECG/medicine tests: ordered.    Risk  Prescription drug management.  Decision regarding hospitalization.      Patient is willing to stay for hydration and potassium replacement therapy.  She is feeling some better with only minimal lower abdominal pain compared to her initial

## 2024-11-04 ENCOUNTER — TELEPHONE (OUTPATIENT)
Dept: SURGERY | Age: 74
End: 2024-11-04

## 2024-11-04 VITALS
HEART RATE: 70 BPM | SYSTOLIC BLOOD PRESSURE: 111 MMHG | RESPIRATION RATE: 18 BRPM | WEIGHT: 181.8 LBS | OXYGEN SATURATION: 97 % | TEMPERATURE: 97.3 F | BODY MASS INDEX: 26.93 KG/M2 | DIASTOLIC BLOOD PRESSURE: 62 MMHG | HEIGHT: 69 IN

## 2024-11-04 PROBLEM — E43 SEVERE MALNUTRITION (HCC): Status: ACTIVE | Noted: 2024-11-04

## 2024-11-04 PROBLEM — E86.0 DEHYDRATION, MODERATE: Status: ACTIVE | Noted: 2024-11-04

## 2024-11-04 LAB
ALBUMIN SERPL-MCNC: 2.9 G/DL (ref 3.5–5.2)
ALBUMIN/GLOB SERPL: 0.9 {RATIO} (ref 1–2.5)
ALP SERPL-CCNC: 133 U/L (ref 35–104)
ALT SERPL-CCNC: 97 U/L (ref 10–35)
ANION GAP SERPL CALCULATED.3IONS-SCNC: 6 MMOL/L (ref 9–16)
AST SERPL-CCNC: 45 U/L (ref 10–35)
BASOPHILS # BLD: 0 K/UL (ref 0–0.2)
BASOPHILS NFR BLD: 0 % (ref 0–2)
BILIRUB SERPL-MCNC: 0.5 MG/DL (ref 0–1.2)
BUN SERPL-MCNC: 24 MG/DL (ref 8–23)
BUN/CREAT SERPL: 27 (ref 9–20)
CALCIUM SERPL-MCNC: 7.9 MG/DL (ref 8.6–10.4)
CHLORIDE SERPL-SCNC: 101 MMOL/L (ref 98–107)
CO2 SERPL-SCNC: 29 MMOL/L (ref 20–31)
CREAT SERPL-MCNC: 0.9 MG/DL (ref 0.5–0.9)
EOSINOPHIL # BLD: 0.09 K/UL (ref 0–0.44)
EOSINOPHILS RELATIVE PERCENT: 3 % (ref 1–4)
ERYTHROCYTE [DISTWIDTH] IN BLOOD BY AUTOMATED COUNT: 13.9 % (ref 11.8–14.4)
GFR, ESTIMATED: 67 ML/MIN/1.73M2
GLUCOSE BLD-MCNC: 115 MG/DL (ref 74–100)
GLUCOSE BLD-MCNC: 155 MG/DL (ref 74–100)
GLUCOSE BLD-MCNC: 194 MG/DL (ref 74–100)
GLUCOSE SERPL-MCNC: 146 MG/DL (ref 74–99)
HCT VFR BLD AUTO: 37.5 % (ref 36.3–47.1)
HGB BLD-MCNC: 12 G/DL (ref 11.9–15.1)
IMM GRANULOCYTES # BLD AUTO: 0 K/UL (ref 0–0.3)
IMM GRANULOCYTES NFR BLD: 0 %
LYMPHOCYTES NFR BLD: 0.36 K/UL (ref 1.1–3.7)
LYMPHOCYTES RELATIVE PERCENT: 12 % (ref 24–43)
MCH RBC QN AUTO: 27.5 PG (ref 25.2–33.5)
MCHC RBC AUTO-ENTMCNC: 32 G/DL (ref 28.4–34.8)
MCV RBC AUTO: 86 FL (ref 82.6–102.9)
MONOCYTES NFR BLD: 0.15 K/UL (ref 0.1–1.2)
MONOCYTES NFR BLD: 5 % (ref 3–12)
MORPHOLOGY: ABNORMAL
NEUTROPHILS NFR BLD: 80 % (ref 36–65)
NEUTS SEG NFR BLD: 2.4 K/UL (ref 1.5–8.1)
NRBC BLD-RTO: 0 PER 100 WBC
PLATELET # BLD AUTO: ABNORMAL K/UL (ref 138–453)
PLATELET, FLUORESCENCE: 118 K/UL (ref 138–453)
PLATELETS.RETICULATED NFR BLD AUTO: 4.9 % (ref 1.1–10.3)
POTASSIUM SERPL-SCNC: 3.5 MMOL/L (ref 3.7–5.3)
POTASSIUM SERPL-SCNC: 3.8 MMOL/L (ref 3.7–5.3)
PROT SERPL-MCNC: 5.9 G/DL (ref 6.6–8.7)
RBC # BLD AUTO: 4.36 M/UL (ref 3.95–5.11)
SODIUM SERPL-SCNC: 136 MMOL/L (ref 136–145)
WBC OTHER # BLD: 3 K/UL (ref 3.5–11.3)

## 2024-11-04 PROCEDURE — 96372 THER/PROPH/DIAG INJ SC/IM: CPT

## 2024-11-04 PROCEDURE — 97116 GAIT TRAINING THERAPY: CPT

## 2024-11-04 PROCEDURE — 96366 THER/PROPH/DIAG IV INF ADDON: CPT

## 2024-11-04 PROCEDURE — 94150 VITAL CAPACITY TEST: CPT

## 2024-11-04 PROCEDURE — 94761 N-INVAS EAR/PLS OXIMETRY MLT: CPT

## 2024-11-04 PROCEDURE — 84132 ASSAY OF SERUM POTASSIUM: CPT

## 2024-11-04 PROCEDURE — 85025 COMPLETE CBC W/AUTO DIFF WBC: CPT

## 2024-11-04 PROCEDURE — G0378 HOSPITAL OBSERVATION PER HR: HCPCS

## 2024-11-04 PROCEDURE — 80053 COMPREHEN METABOLIC PANEL: CPT

## 2024-11-04 PROCEDURE — 2500000003 HC RX 250 WO HCPCS: Performed by: STUDENT IN AN ORGANIZED HEALTH CARE EDUCATION/TRAINING PROGRAM

## 2024-11-04 PROCEDURE — 97110 THERAPEUTIC EXERCISES: CPT

## 2024-11-04 PROCEDURE — 6360000002 HC RX W HCPCS: Performed by: STUDENT IN AN ORGANIZED HEALTH CARE EDUCATION/TRAINING PROGRAM

## 2024-11-04 PROCEDURE — 6370000000 HC RX 637 (ALT 250 FOR IP): Performed by: STUDENT IN AN ORGANIZED HEALTH CARE EDUCATION/TRAINING PROGRAM

## 2024-11-04 PROCEDURE — 99231 SBSQ HOSP IP/OBS SF/LOW 25: CPT | Performed by: SURGERY

## 2024-11-04 PROCEDURE — 97161 PT EVAL LOW COMPLEX 20 MIN: CPT

## 2024-11-04 PROCEDURE — 97166 OT EVAL MOD COMPLEX 45 MIN: CPT

## 2024-11-04 PROCEDURE — 82947 ASSAY GLUCOSE BLOOD QUANT: CPT

## 2024-11-04 RX ORDER — METOPROLOL TARTRATE 25 MG/1
25 TABLET, FILM COATED ORAL 2 TIMES DAILY
Status: DISCONTINUED | OUTPATIENT
Start: 2024-11-04 | End: 2024-11-04 | Stop reason: HOSPADM

## 2024-11-04 RX ORDER — METOPROLOL TARTRATE 25 MG/1
25 TABLET, FILM COATED ORAL 2 TIMES DAILY
Qty: 60 TABLET | Refills: 0 | Status: SHIPPED | OUTPATIENT
Start: 2024-11-04

## 2024-11-04 RX ORDER — LEVOFLOXACIN 500 MG/1
500 TABLET, FILM COATED ORAL DAILY
Qty: 4 TABLET | Refills: 0 | Status: SHIPPED | OUTPATIENT
Start: 2024-11-05 | End: 2024-11-07

## 2024-11-04 RX ORDER — POTASSIUM CHLORIDE 750 MG/1
10 TABLET, EXTENDED RELEASE ORAL DAILY
Qty: 7 TABLET | Refills: 0 | Status: SHIPPED | OUTPATIENT
Start: 2024-11-04 | End: 2024-11-07 | Stop reason: DRUGHIGH

## 2024-11-04 RX ORDER — LEVOFLOXACIN 500 MG/1
500 TABLET, FILM COATED ORAL DAILY
Status: DISCONTINUED | OUTPATIENT
Start: 2024-11-04 | End: 2024-11-04 | Stop reason: HOSPADM

## 2024-11-04 RX ADMIN — INSULIN LISPRO 1 UNITS: 100 INJECTION, SOLUTION INTRAVENOUS; SUBCUTANEOUS at 11:59

## 2024-11-04 RX ADMIN — LEVOFLOXACIN 500 MG: 500 TABLET, FILM COATED ORAL at 12:28

## 2024-11-04 RX ADMIN — ASPIRIN 81 MG: 81 TABLET, COATED ORAL at 08:35

## 2024-11-04 RX ADMIN — POTASSIUM CHLORIDE, DEXTROSE MONOHYDRATE AND SODIUM CHLORIDE: 150; 5; 450 INJECTION, SOLUTION INTRAVENOUS at 09:59

## 2024-11-04 RX ADMIN — GABAPENTIN 800 MG: 400 CAPSULE ORAL at 08:35

## 2024-11-04 RX ADMIN — CYANOCOBALAMIN TAB 1000 MCG 1000 MCG: 1000 TAB at 08:35

## 2024-11-04 RX ADMIN — ENOXAPARIN SODIUM 40 MG: 100 INJECTION SUBCUTANEOUS at 08:35

## 2024-11-04 RX ADMIN — Medication 1000 UNITS: at 08:35

## 2024-11-04 ASSESSMENT — ENCOUNTER SYMPTOMS
CONSTIPATION: 0
SHORTNESS OF BREATH: 0
COUGH: 0
ABDOMINAL DISTENTION: 0
CHOKING: 0
DIARRHEA: 0
RECTAL PAIN: 0
ABDOMINAL PAIN: 1
VOMITING: 0
CHEST TIGHTNESS: 0
NAUSEA: 1
WHEEZING: 0

## 2024-11-04 NOTE — CARE COORDINATION
Case Management Assessment  Initial Evaluation    Date/Time of Evaluation: 11/4/2024 3:30 PM  Assessment Completed by: SHASHI Mcduffie    If patient is discharged prior to next notation, then this note serves as note for discharge by case management.    Patient Name: Ann Bales                   YOB: 1950  Diagnosis: Hypokalemia [E87.6]  Dehydration, moderate [E86.0]                   Date / Time: 11/3/2024  3:28 PM    Patient Admission Status: Observation   Readmission Risk (Low < 19, Mod (19-27), High > 27): No data recorded  Current PCP: Columba Zazueta, MURRAY - CNP  PCP verified by CM? No    Chart Reviewed: Yes      History Provided by: Patient, Medical Record  Patient Orientation: Alert and Oriented, Person, Place, Situation, Self    Patient Cognition: Alert    Hospitalization in the last 30 days (Readmission):  No    If yes, Readmission Assessment in  Navigator will be completed.    Advance Directives:      Code Status: Full Code   Patient's Primary Decision Maker is: Legal Next of Kin    Primary Decision Maker: Compa Sherman - Domestic Partner - [phone]    Discharge Planning:    Patient lives with: Spouse/Significant Other Type of Home: House  Primary Care Giver: Self  Patient Support Systems include: Spouse/Significant Other, Family Members, Friends/Neighbors   Current Financial resources: Medicare  Current community resources: None  Current services prior to admission: None            Current DME:              Type of Home Care services:  None    ADLS  Prior functional level: Independent in ADLs/IADLs  Current functional level: Independent in ADLs/IADLs    PT AM-PAC:   /24  OT AM-PAC: 19 /24    Family can provide assistance at DC: Yes  Would you like Case Management to discuss the discharge plan with any other family members/significant others, and if so, who? No  Plans to Return to Present Housing: Yes  Other Identified Issues/Barriers to RETURNING to current housing:

## 2024-11-04 NOTE — CONSULTS
Patient was admitted last night with abdominal, main lower abdominal pain.  She describes vague lower abdominal nausea for well over 2 weeks.  Had dry heaves 3 times over the period of time.  She had few bowel movements.  The stools she had were slimy and dark.  She was not eating any solid food, but small amounts of water.  She felt generally weak.    It is not clear why she waited until yesterday to be evaluated.  She was found to have hypokalemia.  She is diabetic.  Started on treatment for UTI 10/30/24, nitrofurantoin and moxifloxacin.     Today, she no longer is having any discomfort or nausea, and is tolerating diet.    Past Medical History:   Diagnosis Date    Acute pain of right shoulder 06/29/2023    Arthritis     Crespo's esophagus     Chronic obstructive pulmonary disease, unspecified COPD type (HCC) 10/16/2024    Diabetes mellitus (HCC)     Epigastric pain 08/31/2021    Hypertension     Other skin changes 06/17/2024     Past Surgical History:   Procedure Laterality Date    APPENDECTOMY  1969    BACK SURGERY  1994    upper disc    BACK SURGERY  2019    protruded disc    CHOLECYSTECTOMY      1988    COLONOSCOPY  05/2017    COLONOSCOPY  08/31/2021    Dr. Richardson - normal colonoscopy    COLONOSCOPY N/A 8/31/2021    COLORECTAL CANCER SCREENING, NOT HIGH RISK performed by Misael Richardson MD at [facility] OR    CYST REMOVAL  1965    pilonidal    ENDOSCOPY, COLON, DIAGNOSTIC  07/2019    GASTRIC FUNDOPLICATION  2006    HYSTERECTOMY (CERVIX STATUS UNKNOWN)  1998    still has ovaries    JOINT REPLACEMENT Left 2015    HIP    JOINT REPLACEMENT Right     HIP    KNEE ARTHROSCOPY Bilateral 2000    LUMBAR FUSION N/A 01/18/2019    HARDWARE REMOVAL L4-S1, L3-4 DECOMPRESSION AND FUSION EXTENSION TO L3 performed by Tali Ramsay MD at [facility] OR    TOTAL HIP ARTHROPLASTY Right 06/09/2016    TOTAL KNEE ARTHROPLASTY Right 2002    TOTAL KNEE ARTHROPLASTY Left 2010    UPPER GASTROINTESTINAL ENDOSCOPY  05/2017    UPPER

## 2024-11-04 NOTE — TELEPHONE ENCOUNTER
----- Message from Dr. Beto Tellez MD sent at 11/4/2024  2:09 PM EST -----  Patient is scheduled later this month for an EGD for Crespo's esophagus.  She was previously managed by Dr. Vargas Epperson (spelling?).  There are not records of scopes or pathology.  Please track down both and get them in the chart.    Thanks    JAP

## 2024-11-04 NOTE — DISCHARGE INSTR - DIET
Good nutrition is important when healing from an illness, injury, or surgery.  Follow any nutrition recommendations given to you during your hospital stay.   If you were given an oral nutrition supplement while in the hospital, continue to take this supplement at home.  You can take it with meals, in-between meals, and/or before bedtime. These supplements can be purchased at most local grocery stores, pharmacies, and chain [employer]-stores.   If you have any questions about your diet or nutrition, call the hospital and ask for the dietitian.  Carb Control, increase fluid intakes

## 2024-11-04 NOTE — DISCHARGE SUMMARY
[address] , [place], [place], [address]    Discharge Summary      NAME:  Ann Bales  :  1950  MRN:  [id number]    Admit date:  11/3/2024  Discharge date:  2024    Admitting Physician:  Billy Christianson MD    Primary Diagnosis on Admission:   Present on Admission:   Hypokalemia   Type 2 diabetes mellitus, without long-term current use of insulin (HCC)   Primary osteoarthritis of right hip   Neuropathy   Hypertrophic cardiomyopathy (HCC)   Essential (primary) hypertension   Depression   Cryptogenic stroke (HCC)   Chronic obstructive pulmonary disease, unspecified COPD type (HCC)   Crespo's esophagus with dysplasia   Anxiety   Recent urinary tract infection   Severe malnutrition (HCC)   Dehydration, moderate      Secondary Diagnoses:  has Prolonged Q-T interval on ECG and Cryptogenic stroke (HCC) on their pertinent problem list.    Admission Condition:  serious     Discharged Condition: stable    Hospital Course:   The patient was admitted for the management of hypokalemia and hypotension. She was started on IVF and K replacement at the time. She had been on antibiotics for a UTI and cultures had been obtained. GS was consulted at the time given ongoing abdominal discomfort/nausea which resolved. Today on day of discharge pt feels better with no further complaints. Vitals and Labs are stable.  All consultants involved during this admission are agreeable to d/c. She was adamant on being discharged with close OP follow-up. She was also started on Levaquin at the time given the most recent culture. She will have repeat labs in 2 days' time.    If there are any worsening or concerning signs or symptoms, patient will report to the ED and/or contact [phone] for immediate evaluation. Teach back method was used. All patient questions answered. Pt voiced understanding.     Consults:  MED[facility]AT    Significant Diagnostic/theraputic interventions: IVF, K replacement,       Disposition:

## 2024-11-04 NOTE — RT PROTOCOL NOTE
RESPIRATORY ASSESSMENT PROTOCOL                                                                                              Patient Name: Ann Bales Room#: 0330/0330-01 : 1950     Admitting diagnosis: Hypokalemia [E87.6]  Dehydration, moderate [E86.0]       Medical History:   Past Medical History:   Diagnosis Date    Acute pain of right shoulder 2023    Arthritis     Crespo's esophagus     Chronic obstructive pulmonary disease, unspecified COPD type (HCC) 10/16/2024    Diabetes mellitus (HCC)     Epigastric pain 2021    Hypertension     Other skin changes 2024       PATIENT ASSESSMENT    LABORATORY DATA  Hematology:   Lab Results   Component Value Date/Time    WBC 4.8 2024 03:48 PM    RBC 5.13 2024 03:48 PM    HGB 14.0 2024 03:48 PM    HCT 43.1 2024 03:48 PM     2024 03:48 PM     Chemistry:  No results found for: \"PHART\", \"JQW0ADV\", \"PO2ART\", \"I9BUBJEL\", \"TDL5NME\", \"PBEA\", \"NBEA\"    VITALS  Pulse: 68   Respirations: 18  BP: (!) 95/50  SpO2: 96 % O2 Device: None (Room air)  Temp: 96.8 °F (36 °C)    SKIN COLOR  [x] Normal  [] Pale  [] Dusky  [] Cyanotic    RESPIRATORY PATTERN  [x] Normal  [] Dyspnea  [] Cheyne-Martinez  [] Kussmaul  [] Biots    AMBULATORY  [x] Yes  [] No  [] With Assistance      Patient Acuity 0 1 2 3 4 Score   Level of Consciousness (LOC) [x]  Alert & Oriented or Pt normal LOC []  Confused;follows directions []  Confused & uncooper-ative []  Obtunded []  Comatose 0   Respiratory Rate  (RR) [x]  Reg. rate & pattern. 12 - 20 bpm  []  Increased RR. Greater than 20 bpm   []  SOB w/ exertion or RR greater than 24 bpm []  Access- ory muscle use at rest. Abn.  resp. []  SOB at rest.   0   Bilateral Breath Sounds (BBS) [x]  Clear []  Diminish-ed bases  []  Diminish-ed t/o, or rales   []  Sporadic, scattered wheezes or rhonchi []  Persistentwheezes and, or absent BBS 0   Cough [x]  Strong, effective, & non-prod. []  Effective & prod. Less

## 2024-11-04 NOTE — H&P
utilized all available immediate resources to obtain, update, or review the patient's current medications (including all prescriptions, over-the-counter products, herbals, cannabinoid products and bitamin/mineral/dietary/nutritional supplements.  [If 'yes\", STOP HERE]     []  The patient is not eligible for medication reconciliation; the patient is in an emergent medical situation where delaying treatment would jeopardize the patient's health    []  I did NOT confirm, update or review the patient's current list of medications today.  [DOES NOT SATISFY MIPS PERFORMANCE]    [facility] Advanced Care Planning documentation:  [x] I have confirmed that the patient's Advance Care Plan is present, Code Status is documented, or surrogate decision maker is listed in the patient's medical record  [If \"yes\", STOP HERE]     [] The patient's Advance Care Plan is NOT present because:    []  I confirmed today that the patient does not wish or was not able to name a   surrogate decision maker or provide and advance care plan.    [] Hospice care is currently being provided or has been provided within the   calendar year.    []  I did NOT confirm today the presence of an Advance Care Plan or surrogate   decision maker documented within the patient's medical record.   [DOES NOT SATISFY MIPS PERFORMANCE]    CORE MEASURES  DVT prophylaxis: Lovenox  Decubitus ulcer present on admission: No  CODE STATUS: FULL CODE  Nutrition Status: good   Physical therapy: Yes   Old Charts reviewed: Yes  EKG Reviewed: Yes  Advance Directive Addressed: Yes    MURRAY Cardona - CNP, MURRAY, NP-C  11/3/2024, 10:26 PM

## 2024-11-04 NOTE — PROGRESS NOTES
Admission assessment and VS obtained at this time. Pt is A&Ox4. Complains of pain related to nausea. Saltine crackers provided. Medications administered as ordered. Pt updated on plan of care and orders. Denies any other needs at this time. Call light within reach, care ongoing.   
Comprehensive Nutrition Assessment    Type and Reason for Visit:  Initial, Positive nutrition screen (MST 2)    Nutrition Recommendations/Plan:   Continue current diet.   Continue current ONS.      Malnutrition Assessment:  Malnutrition Status:  Severe malnutrition (11/04/24 1222)    Context:  Acute Illness     Findings of the 6 clinical characteristics of malnutrition:  Energy Intake:  50% or less of estimated energy requirements for 5 or more days (2-3 weeks)  Weight Loss:  Greater than 5% over 1 month (7.8% x 3 weeks)     Body Fat Loss:  No body fat loss     Muscle Mass Loss:  No muscle mass loss    Fluid Accumulation:  No fluid accumulation     Strength:  Not Performed    Nutrition Assessment:    Severe malnutrition (acute) r/t inadequate protein/energy intakes aeb N/V/abdominal pain, weight loss 6.7%/13# x 3 months, poor PO, less than 50% of usual PO x 2-3 weeks. Altered nutrition related labs r/t endocrine dysfunction aeb A1c 7.0, glucose 146. Pt provided basic CC diet education, encouraged consistent carbohydrates and consistent meals.    Nutrition Related Findings:    no edema. Active bowel sounds. Wound Type: None       Current Nutrition Intake & Therapies:    Average Meal Intake: 51-75%  Average Supplements Intake: Unable to assess  ADULT ORAL NUTRITION SUPPLEMENT; Breakfast, Lunch, Dinner; Standard High Calorie/High Protein Oral Supplement  ADULT DIET; Regular; 4 carb choices (60 gm/meal)    Anthropometric Measures:  Height: 175.3 cm (5' 9\")  Ideal Body Weight (IBW): 145 lbs (66 kg)    Admission Body Weight: 83 kg (183 lb)  Current Body Weight: 82.5 kg (181 lb 14.1 oz), 125.4 % IBW. Weight Source: Bed scale  Current BMI (kg/m2): 26.8  Usual Body Weight: 88.5 kg (195 lb)     % Weight Change (Calculated): -6.7  Weight Adjustment For: No Adjustment                 BMI Categories: Overweight (BMI 25.0-29.9)    Estimated Daily Nutrient Needs:  Energy Requirements Based On: Kcal/kg  Weight Used for Energy 
Dr. Christianson messaged about lower blood pressures today and last night and possible change in lopressor. Asked about stopping fluids since potassium is back WNL. See orders for changes. Continue fluids per Dr. Christianson.  
Entered patient's room for morning vital signs and head to toe assessment. Patient resting in the bed at this time. A&O x4, calm, and cooperative. Patient denies of pain at this time. Vital signs and head to toe assessment completed at this time, see flowsheets for more details. Patient denies no more needs at this time. Call light within reach. Bed alarm on. Bed/chair wheels locked. Bed in lowest position.  
General surgery office called at this time for consult. Completed  
Occupational Therapy  Facility/Department: [facility] MED SURG  Daily Treatment Note  NAME: Ann Bales  : 1950  MRN: [id number]    Date of Service: 2024    Discharge Recommendations:  Continue to assess pending progress         Patient Diagnosis(es): The primary encounter diagnosis was Dehydration, moderate. A diagnosis of Hypokalemia was also pertinent to this visit.     Assessment   Activity Tolerance: Patient tolerated treatment well  Discharge Recommendations: Continue to assess pending progress     Plan  Occupational Therapy Plan  Times Per Day: Once a day  Days Per Week: 7 Days  Current Treatment Recommendations: Strengthening;Balance training;Functional mobility training;Endurance training;Patient/Caregiver education & training;Equipment evaluation, education, & procurement;Safety education & training;Self-Care / ADL    Restrictions  Restrictions/Precautions  Restrictions/Precautions: General Precautions;Fall Risk  Required Braces or Orthoses?: No    Subjective  Subjective  Subjective: Pt sitting up in bed upon arrival. Pt agreed to participate in therapy session.  Pain: Pt had no complaints of pain.         Objective  Vitals            OT Exercises  Exercise Treatment: Pt completed BUE Ther ex with 1# dumbbell x 7 planes x15 reps x 1 set to increase UE strength and endurance in order to ease completion of ADL tasks. Pt required RBs as needed secondary to fatigue.     Safety Devices  Type of Devices: All fall risk precautions in place;Left in bed;Bed alarm in place;Nurse notified;Call light within reach    Patient Education  Education Given To: Patient  Education Provided: Role of Therapy;Plan of Care  Education Method: Verbal  Barriers to Learning: None  Education Outcome: Verbalized understanding    Goals  Short Term Goals  Time Frame for Short Term Goals: 21 visits  Short Term Goal 1: Patient to be educated on d/c folder, AE/DME and basic home safety to ensure safe and independent return 
Occupational Therapy  Facility/Department: [facility] MED SURG  Occupational Therapy Initial Assessment    Name: Ann Bales  : 1950  MRN: [id number]  Date of Service: 2024    Discharge Recommendations:  Continue to assess pending progress          Patient Diagnosis(es): The primary encounter diagnosis was Dehydration, moderate. A diagnosis of Hypokalemia was also pertinent to this visit.  Past Medical History:  has a past medical history of Acute pain of right shoulder, Arthritis, Crespo's esophagus, Chronic obstructive pulmonary disease, unspecified COPD type (HCC), Diabetes mellitus (HCC), Epigastric pain, Hypertension, and Other skin changes.  Past Surgical History:  has a past surgical history that includes Appendectomy (); back surgery (); Cholecystectomy; Hysterectomy (); joint replacement (Left, ); cyst removal (); Knee arthroscopy (Bilateral, ); Gastric fundoplication (); Endoscopy, colon, diagnostic (2019); vascular surgery (Bilateral, ); Total hip arthroplasty (Right, 2016); lumbar fusion (N/A, 2019); Total knee arthroplasty (Right, ); Total knee arthroplasty (Left, ); joint replacement (Right); back surgery (); Upper gastrointestinal endoscopy (2017); Upper gastrointestinal endoscopy (2021); Colonoscopy (2017); Colonoscopy (2021); Colonoscopy (N/A, 2021); and Upper gastrointestinal endoscopy (N/A, 2021).    Treatment Diagnosis: Weakness      Assessment  Performance deficits / Impairments: Decreased functional mobility ;Decreased ADL status;Decreased strength;Decreased endurance;Decreased balance  Assessment: 75 y/o F admitted to [facility] for hypokalemia. patient presents with generalized weakness and deconditioning, requiring incrased need for assistance during ADL. Patient would benefit from OT services to address to ensure safe and independent return home.  Treatment Diagnosis: Weakness  Prognosis: 
Physical Therapy  Facility/Department: [facility] MED SURG  Physical Therapy Initial Assessment    Name: Ann Bales  : 1950  MRN: [id number]  Date of Service: 2024    Discharge Recommendations:  Continue to assess pending progress, Home independently          Patient Diagnosis(es): The primary encounter diagnosis was Dehydration, moderate. A diagnosis of Hypokalemia was also pertinent to this visit.  Past Medical History:  has a past medical history of Acute pain of right shoulder, Arthritis, Crespo's esophagus, Chronic obstructive pulmonary disease, unspecified COPD type (HCC), Diabetes mellitus (HCC), Epigastric pain, Hypertension, and Other skin changes.  Past Surgical History:  has a past surgical history that includes Appendectomy (); back surgery (); Cholecystectomy; Hysterectomy (); joint replacement (Left, ); cyst removal (); Knee arthroscopy (Bilateral, ); Gastric fundoplication (); Endoscopy, colon, diagnostic (2019); vascular surgery (Bilateral, ); Total hip arthroplasty (Right, 2016); lumbar fusion (N/A, 2019); Total knee arthroplasty (Right, ); Total knee arthroplasty (Left, ); joint replacement (Right); back surgery (); Upper gastrointestinal endoscopy (2017); Upper gastrointestinal endoscopy (2021); Colonoscopy (2017); Colonoscopy (2021); Colonoscopy (N/A, 2021); and Upper gastrointestinal endoscopy (N/A, 2021).    Assessment  Body Structures, Functions, Activity Limitations Requiring Skilled Therapeutic Intervention: Decreased functional mobility ;Decreased strength;Decreased balance  Assessment: Pt is a 74 y.o. female currently admitted for hypokalemia. Upon exam pt demos decreased B hip and knee strength primarily in flexion and abd. Pt displays decreased functional ambulation as pt ambulated 5' to bedside chair, complaints of dizziness upon standing, CGA with HHAx1 on IV pole. Pt would benefit from 
Physical Therapy  Facility/Department: [facility] MED SURG  Daily Treatment Note  NAME: Ann Bales  : 1950  MRN: [id number]    Date of Service: 2024    Discharge Recommendations:  Continue to assess pending progress, Home independently     Patient Diagnosis(es): The primary encounter diagnosis was Dehydration, moderate. A diagnosis of Hypokalemia was also pertinent to this visit.    Assessment  Assessment: Pt. ambulated [id number] with management of IV pole and CGA for safety. Pt. with mild unsteadiness during ambulation but no LOB. Slow cadance with decreased BLE step length noted. Bed mobility and Transfers:SBA. Pt. completed seated EOB BLE therex x15 in all planes of motion. STS 2x5.  Activity Tolerance: Patient tolerated treatment well    Plan  Physical Therapy Plan  General Plan: 2 times a day 7 days a week  Specific Instructions for Next Treatment: once per day on weekends and holidays  Current Treatment Recommendations: Strengthening;ROM;Balance training;Functional mobility training;Transfer training;Endurance training;Gait training;Stair training;Neuromuscular re-education;Pain management;Home exercise program;Therapeutic activities    Restrictions  Restrictions/Precautions  Restrictions/Precautions: General Precautions, Fall Risk  Required Braces or Orthoses?: No     Subjective   Subjective  Subjective: Pt. in bed upon arrival, agreeable to therapy at this time  Pain: Reporting very minimal pain in lower abdomin  Orientation  Overall Orientation Status: Within Normal Limits  Cognition  Overall Cognitive Status: WFL    Objective  Bed Mobility Training  Bed Mobility Training: Yes  Overall Level of Assistance: Stand-by assistance;Assist X1  Interventions: Safety awareness training  Rolling: Stand-by assistance;Assist X1  Supine to Sit: Stand-by assistance;Assist X1  Scooting: Supervision  Transfer Training  Transfer Training: Yes  Overall Level of Assistance: Stand-by assistance;Assist 
Report given to GREGG Boone at this time.   
Reviewed discharge instructions with patient and partner.  Patient aware of need to  prescriptions.   Reviewed new medications and side effects to monitor for.   Reviewed home medications and when next dose is due.  Patient aware of date/time of follow up appointment.  Aware of need for repeat labs on 11/6/24.  Instructed to follow a carb control diet and to increase fluid intakes.   Educational handout given on Hypokalemia and UTI.   Questions answered.   Verbalizes understanding.   Copy of discharge instructions given to patient.  
rhythm, normal rate without murmur, gallop or rub.  Abdomen - Soft, nontender, nondistended, no masses or organomegaly  Neurologic - There are no new focal motor or sensory deficits  Skin - No bruising or bleeding on exposed skin area  Extremities - No cyanosis, clubbing or edema      DIAGNOSTICS:     Laboratory Testing:    See [facility] EMR for lab data    Recent Results (from the past 24 hour(s))   EKG 12 Lead (Select if Upper Abd Pain, or SOB, Diaphoresis or Tachy)    Collection Time: 11/03/24  3:39 PM   Result Value Ref Range    Ventricular Rate 92 BPM    Atrial Rate 92 BPM    P-R Interval 176 ms    QRS Duration 94 ms    Q-T Interval 330 ms    QTc Calculation (Bazett) 408 ms    R Axis 104 degrees    T Axis -16 degrees   CBC with Diff    Collection Time: 11/03/24  3:48 PM   Result Value Ref Range    WBC 4.8 3.5 - 11.3 k/uL    RBC 5.13 (H) 3.95 - 5.11 m/uL    Hemoglobin 14.0 11.9 - 15.1 g/dL    Hematocrit 43.1 36.3 - 47.1 %    MCV 84.0 82.6 - 102.9 fL    MCH 27.3 25.2 - 33.5 pg    MCHC 32.5 28.4 - 34.8 g/dL    RDW 13.6 11.8 - 14.4 %    Platelets 155 138 - 453 k/uL    MPV 11.7 8.1 - 13.5 fL    NRBC Automated 0.0 0.0 per 100 WBC    Neutrophils % 89 (H) 36 - 65 %    Lymphocytes % 3 (L) 24 - 43 %    Monocytes % 5 3 - 12 %    Eosinophils % 2 1 - 4 %    Immature Granulocytes % 1 (H) 0 %    Basophils % 0 0 - 2 %    Neutrophils Absolute 4.27 1.50 - 8.10 k/uL    Lymphocytes Absolute 0.14 (L) 1.10 - 3.70 k/uL    Monocytes Absolute 0.24 0.10 - 1.20 k/uL    Eosinophils Absolute 0.10 0.00 - 0.44 k/uL    Immature Granulocytes Absolute 0.05 0.00 - 0.30 k/uL    Basophils Absolute 0.00 0.0 - 0.2 k/uL    Morphology Normal    CMP    Collection Time: 11/03/24  3:48 PM   Result Value Ref Range    Sodium 134 (L) 136 - 145 mmol/L    Potassium 2.9 (LL) 3.7 - 5.3 mmol/L    Chloride 93 (L) 98 - 107 mmol/L    CO2 26 20 - 31 mmol/L    Anion Gap 15 9 - 16 mmol/L    Glucose 142 (H) 74 - 99 mg/dL    BUN 28 (H) 8 - 23 mg/dL    Creatinine 1.0 (H) 0.50

## 2024-11-04 NOTE — PLAN OF CARE
Problem: Safety - Adult  Goal: Free from fall injury  11/4/2024 0154 by Beata Saenz RN  Outcome: Progressing     Problem: Chronic Conditions and Co-morbidities  Goal: Patient's chronic conditions and co-morbidity symptoms are monitored and maintained or improved  11/4/2024 0154 by Beata Saenz RN  Outcome: Progressing     Problem: Discharge Planning  Goal: Discharge to home or other facility with appropriate resources  11/4/2024 0154 by Beata Saenz RN  Outcome: Progressing     Problem: Pain  Goal: Verbalizes/displays adequate comfort level or baseline comfort level  11/4/2024 0154 by Beata Saenz RN  Outcome: Progressing  Flowsheets (Taken 11/4/2024 0154)  Verbalizes/displays adequate comfort level or baseline comfort level:   Administer analgesics based on type and severity of pain and evaluate response   Encourage patient to monitor pain and request assistance   Implement non-pharmacological measures as appropriate and evaluate response   Assess pain using appropriate pain scale     Problem: Skin/Tissue Integrity  Goal: Absence of new skin breakdown  Description: 1.  Monitor for areas of redness and/or skin breakdown  2.  Assess vascular access sites hourly  3.  Every 4-6 hours minimum:  Change oxygen saturation probe site  4.  Every 4-6 hours:  If on nasal continuous positive airway pressure, respiratory therapy assess nares and determine need for appliance change or resting period.  11/4/2024 0154 by Betaa Saenz RN  Outcome: Progressing     Problem: Metabolic/Fluid and Electrolytes - Adult  Goal: Electrolytes maintained within normal limits  Outcome: Progressing  Flowsheets (Taken 11/4/2024 0154)  Electrolytes maintained within normal limits:   Monitor labs and assess patient for signs and symptoms of electrolyte imbalances   Administer electrolyte replacement as ordered   Monitor response to electrolyte replacements, including repeat lab results as appropriate

## 2024-11-05 ENCOUNTER — CARE COORDINATION (OUTPATIENT)
Dept: CARE COORDINATION | Age: 74
End: 2024-11-05

## 2024-11-05 LAB
MICROORGANISM SPEC CULT: NO GROWTH
SERVICE CMNT-IMP: NORMAL
SPECIMEN DESCRIPTION: NORMAL

## 2024-11-05 NOTE — CARE COORDINATION
Care Transitions Note    Initial Call - Call within 2 business days of discharge: Yes    Attempted to reach patient for transitions of care follow up. Unable to reach patient.    Outreach Attempts:   Multiple attempts to contact patient at phone numbers on file.     Patient: Ann Bales    Patient : 1950   MRN: [id number]    Reason for Admission: Dehydration  Discharge Date: 24  RURS: No data recorded  Last Discharge Facility       Date Complaint Diagnosis Description Type Department Provider    11/3/24 Abdominal Pain Dehydration, moderate ... ED to Hosp-Admission (Discharged) (ADMITTED) [facility] Billy Christianson MD            Was this an external facility discharge? No    Follow Up Appointment:   Patient has hospital follow up appointment scheduled within 7 days of discharge.    Future Appointments         Provider Specialty Dept Phone    2024 12:00 PM Columba Zazueta APRN [facility] [phone]    2024 2:40 PM Columba Zazueta APRN [facility] [phone]    2024 10:30 AM Beto Tellez MD General Surgery [phone]    2024 12:00 PM SCHEDULE, [facility] [phone]    2025 11:30 AM Evangelina Mendiola APRN [facility] [phone]            Plan for follow-up on next business day.      Sunshine Roberts RN

## 2024-11-06 ENCOUNTER — CARE COORDINATION (OUTPATIENT)
Dept: CARE COORDINATION | Age: 74
End: 2024-11-06

## 2024-11-06 ENCOUNTER — HOSPITAL ENCOUNTER (OUTPATIENT)
Age: 74
Discharge: HOME | End: 2024-11-06
Payer: MEDICARE

## 2024-11-06 DIAGNOSIS — E86.0 DEHYDRATION, MODERATE: ICD-10-CM

## 2024-11-06 DIAGNOSIS — E87.6 HYPOKALEMIA: Primary | ICD-10-CM

## 2024-11-06 DIAGNOSIS — E87.6 HYPOKALEMIA: ICD-10-CM

## 2024-11-06 LAB
ALBUMIN SERPL-MCNC: 3.4 G/DL (ref 3.5–5.2)
ALBUMIN/GLOB SERPL: 1.1 {RATIO} (ref 1–2.5)
ALP SERPL-CCNC: 124 U/L (ref 35–104)
ALT SERPL-CCNC: 65 U/L (ref 10–35)
ANION GAP SERPL CALCULATED.3IONS-SCNC: 9 MMOL/L (ref 9–16)
AST SERPL-CCNC: 35 U/L (ref 10–35)
BASOPHILS # BLD: 0.04 K/UL (ref 0–0.2)
BASOPHILS NFR BLD: 1 % (ref 0–2)
BILIRUB SERPL-MCNC: 0.5 MG/DL (ref 0–1.2)
BUN SERPL-MCNC: 10 MG/DL (ref 8–23)
BUN/CREAT SERPL: 14 (ref 9–20)
CALCIUM SERPL-MCNC: 9.1 MG/DL (ref 8.6–10.4)
CHLORIDE SERPL-SCNC: 103 MMOL/L (ref 98–107)
CO2 SERPL-SCNC: 29 MMOL/L (ref 20–31)
CREAT SERPL-MCNC: 0.7 MG/DL (ref 0.5–0.9)
EOSINOPHIL # BLD: 0.26 K/UL (ref 0–0.44)
EOSINOPHILS RELATIVE PERCENT: 5 % (ref 1–4)
ERYTHROCYTE [DISTWIDTH] IN BLOOD BY AUTOMATED COUNT: 14.2 % (ref 11.8–14.4)
GFR, ESTIMATED: >90 ML/MIN/1.73M2
GLUCOSE SERPL-MCNC: 124 MG/DL (ref 74–99)
HCT VFR BLD AUTO: 38.5 % (ref 36.3–47.1)
HGB BLD-MCNC: 12.1 G/DL (ref 11.9–15.1)
IMM GRANULOCYTES # BLD AUTO: <0.03 K/UL (ref 0–0.3)
IMM GRANULOCYTES NFR BLD: 0 %
LYMPHOCYTES NFR BLD: 1.38 K/UL (ref 1.1–3.7)
LYMPHOCYTES RELATIVE PERCENT: 25 % (ref 24–43)
MCH RBC QN AUTO: 27.3 PG (ref 25.2–33.5)
MCHC RBC AUTO-ENTMCNC: 31.4 G/DL (ref 28.4–34.8)
MCV RBC AUTO: 86.9 FL (ref 82.6–102.9)
MONOCYTES NFR BLD: 0.58 K/UL (ref 0.1–1.2)
MONOCYTES NFR BLD: 11 % (ref 3–12)
NEUTROPHILS NFR BLD: 58 % (ref 36–65)
NEUTS SEG NFR BLD: 3.17 K/UL (ref 1.5–8.1)
NRBC BLD-RTO: 0 PER 100 WBC
PLATELET # BLD AUTO: 219 K/UL (ref 138–453)
PMV BLD AUTO: 10.7 FL (ref 8.1–13.5)
POTASSIUM SERPL-SCNC: 3.1 MMOL/L (ref 3.7–5.3)
PROT SERPL-MCNC: 6.6 G/DL (ref 6.6–8.7)
RBC # BLD AUTO: 4.43 M/UL (ref 3.95–5.11)
SODIUM SERPL-SCNC: 141 MMOL/L (ref 136–145)
WBC OTHER # BLD: 5.4 K/UL (ref 3.5–11.3)

## 2024-11-06 PROCEDURE — 1111F DSCHRG MED/CURRENT MED MERGE: CPT | Performed by: NURSE PRACTITIONER

## 2024-11-06 PROCEDURE — 36415 COLL VENOUS BLD VENIPUNCTURE: CPT

## 2024-11-06 PROCEDURE — 80053 COMPREHEN METABOLIC PANEL: CPT

## 2024-11-06 PROCEDURE — 85025 COMPLETE CBC W/AUTO DIFF WBC: CPT

## 2024-11-06 NOTE — CARE COORDINATION
Care Transitions Note    Initial Call - Call within 2 business days of discharge: Yes    Patient Current Location:  Home: [address] [place] [id number]    Care Transition Nurse contacted the patient by telephone to perform post hospital discharge assessment, verified name and  as identifiers. Provided introduction to self, and explanation of the Care Transition Nurse role.     Patient: Ann Bales    Patient : 1950   MRN: [id number]    Reason for Admission: Hypokalemia, dehydration  Discharge Date: 24  RURS: No data recorded    Last Discharge Facility       Date Complaint Diagnosis Description Type Department Provider    11/3/24 Abdominal Pain Dehydration, moderate ... ED to Hosp-Admission (Discharged) (ADMITTED) [facility] Billy Christianson MD            Was this an external facility discharge? No    Additional needs identified to be addressed with provider   No needs identified             Method of communication with provider: none.    Patients top risk factors for readmission: medical condition-hypokalemia    Interventions to address risk factors:   Education: Medications as ordered, check blood pressures, follow up with PCP    Care Summary Note: Spoke with patient for initial 24 hour follow up call. Patient came to the hospital on advice of her PCP for abdominal pain that had been persisting for over 2 weeks and generalized feeling of unwell.  She was treated for dehydration and UTI, symptoms improved and she discharged to home. Today, she denies any abdominal pain, denies any dysuria, f/c, n/v but does have some back discomfort which she indicated is chronic and some head pressure today.  She is on her way currently to get labs done that were ordered at discharge and she has follow up tomorrow with her PCP.  Patient is independent, denies any needs or issues at this time.  Discharge instructions reviewed, 1111 F order entered, patient did get new scripts and is taking. She is on lower dose

## 2024-11-07 ENCOUNTER — OFFICE VISIT (OUTPATIENT)
Dept: PRIMARY CARE CLINIC | Age: 74
End: 2024-11-07

## 2024-11-07 VITALS
HEART RATE: 71 BPM | TEMPERATURE: 96.8 F | OXYGEN SATURATION: 95 % | WEIGHT: 185 LBS | BODY MASS INDEX: 27.32 KG/M2 | DIASTOLIC BLOOD PRESSURE: 70 MMHG | SYSTOLIC BLOOD PRESSURE: 108 MMHG

## 2024-11-07 DIAGNOSIS — E87.6 HYPOKALEMIA: Primary | ICD-10-CM

## 2024-11-07 DIAGNOSIS — N30.01 ACUTE CYSTITIS WITH HEMATURIA: ICD-10-CM

## 2024-11-07 RX ORDER — POTASSIUM CHLORIDE 1500 MG/1
20 TABLET, EXTENDED RELEASE ORAL DAILY
Qty: 30 TABLET | Refills: 0 | Status: SHIPPED | OUTPATIENT
Start: 2024-11-07

## 2024-11-07 NOTE — PROGRESS NOTES
Name: Ann Bales  : 1950         Chief Complaint:     Chief Complaint   Patient presents with    Follow-Up from Hospital     -hosp f/u for Hypokalemia  -she is taking levaquin and potassium       History of Present Illness:      Ann Bales is a 74 y.o.  female who presents with Follow-Up from Hospital (-hosp f/u for Hypokalemia/-she is taking levaquin and potassium)      HPI    The patient presents for hospital follow-up.  She was admitted to [facility] 11/3/2024 - 2024 chief diagnosis of hypokalemia.  Hospital course per EMR as noted below:    \"Hospital Course:   The patient was admitted for the management of hypokalemia and hypotension. She was started on IVF and K replacement at the time. She had been on antibiotics for a UTI and cultures had been obtained. GS was consulted at the time given ongoing abdominal discomfort/nausea which resolved. Today on day of discharge pt feels better with no further complaints. Vitals and Labs are stable.  All consultants involved during this admission are agreeable to d/c. She was adamant on being discharged with close OP follow-up. She was also started on Levaquin at the time given the most recent culture. She will have repeat labs in 2 days' time.\"    Today she is present with her partner Compa. She states her stomach pain is \"so much better\". She is continuing with weakness, though this has improved. She is continuing on potassium 10meq QD x7 days and levaquin 500mg QD. Denies SOB or coughing. Denies vomiting. She is continuing with diarrhea but this is her normal/baseline. Denies fever or chills. She is drinking \"a lot\" of water. She is not monitoring BP at home. Denies urinary sx.       Past Medical History:     Past Medical History:   Diagnosis Date    Acute pain of right shoulder 2023    Arthritis     Crespo's esophagus     Chronic obstructive pulmonary disease, unspecified COPD type (HCC) 10/16/2024    Diabetes mellitus (HCC)

## 2024-11-08 ENCOUNTER — HOSPITAL ENCOUNTER (OUTPATIENT)
Age: 74
Discharge: HOME | End: 2024-11-08
Payer: MEDICARE

## 2024-11-08 DIAGNOSIS — E87.6 HYPOKALEMIA: ICD-10-CM

## 2024-11-08 LAB
ANION GAP SERPL CALCULATED.3IONS-SCNC: 9 MMOL/L (ref 9–16)
BUN SERPL-MCNC: 11 MG/DL (ref 8–23)
BUN/CREAT SERPL: 16 (ref 9–20)
CALCIUM SERPL-MCNC: 9.4 MG/DL (ref 8.6–10.4)
CHLORIDE SERPL-SCNC: 102 MMOL/L (ref 98–107)
CO2 SERPL-SCNC: 30 MMOL/L (ref 20–31)
CREAT SERPL-MCNC: 0.7 MG/DL (ref 0.5–0.9)
GFR, ESTIMATED: 87 ML/MIN/1.73M2
GLUCOSE SERPL-MCNC: 127 MG/DL (ref 74–99)
POTASSIUM SERPL-SCNC: 3.7 MMOL/L (ref 3.7–5.3)
SODIUM SERPL-SCNC: 141 MMOL/L (ref 136–145)

## 2024-11-08 PROCEDURE — 80048 BASIC METABOLIC PNL TOTAL CA: CPT

## 2024-11-08 PROCEDURE — 36415 COLL VENOUS BLD VENIPUNCTURE: CPT

## 2024-11-13 ENCOUNTER — CARE COORDINATION (OUTPATIENT)
Dept: CARE COORDINATION | Age: 74
End: 2024-11-13

## 2024-11-13 NOTE — TELEPHONE ENCOUNTER
Lorie - thank you for this update!    [facility] - Please call patient and encourage her to schedule appt with PCP or cardiologist regarding worsening BLE edema

## 2024-11-13 NOTE — CARE COORDINATION
Care Transitions Note    Follow Up Call     Patient Current Location:  Home: [address] [place] [address]    [facility] Care Coordinator contacted the patient by telephone. Verified name and  as identifiers.    Additional needs identified to be addressed with provider   Patient reports bilateral lower leg edema for the past 2 days. States it does improve with elevation but doesn't completely go away. She denies having any SOB-has not checked her BP due to misplacing her cuff  and doesn't weigh regularly so she doesn't know if she has gained any weight. Discussed s/s that would warrant ED visit.                  Method of communication with provider: chart routing.    Care Summary Note: Writer spoke with [place] for a follow up care transitions call. She states she has been feeling better. She has not had any abdominal pain-she does have loose stools but states this is a chronic issue for her. She denies having fever,chills,hematuria,dysuria or urgency/frequency. She had her PCP follow up-KCL was increased to 20 meq QD and repeat labs showed K+ was wnl on increased dose. She states she has had bilateral lower extremity edema for the past 2 days-no SOB. She reports edema does improve with elevation but is still present. She has another PCP appt on 2024-message routed to PCP regarding edema.     Plan of care updates since last contact:  Review of patient management of conditions/medications:         Advance Care Planning:   Does patient have an Advance Directive: reviewed during previous call, see note. .    Medication Review:  Medications changed since last call, reviewed today. KCL increased to 20 meq QD.     Remote Patient Monitoring:  Offered patient enrollment in the Remote Patient Monitoring (RPM) program for in-home monitoring: Yes, but did not enroll at this time:   .    Assessments:  Care Transitions Subsequent and Final Call    Subsequent and Final Calls  Do you have any ongoing symptoms?: No  Have

## 2024-11-14 ENCOUNTER — HOSPITAL ENCOUNTER (OUTPATIENT)
Age: 74
Discharge: HOME | End: 2024-11-16
Payer: MEDICARE

## 2024-11-14 ENCOUNTER — HOSPITAL ENCOUNTER (OUTPATIENT)
Age: 74
Discharge: HOME | End: 2024-11-14
Payer: MEDICARE

## 2024-11-14 ENCOUNTER — HOSPITAL ENCOUNTER (OUTPATIENT)
Dept: GENERAL RADIOLOGY | Age: 74
Discharge: HOME | End: 2024-11-16
Payer: MEDICARE

## 2024-11-14 ENCOUNTER — OFFICE VISIT (OUTPATIENT)
Dept: PRIMARY CARE CLINIC | Age: 74
End: 2024-11-14

## 2024-11-14 VITALS
OXYGEN SATURATION: 96 % | HEART RATE: 91 BPM | DIASTOLIC BLOOD PRESSURE: 70 MMHG | BODY MASS INDEX: 28.65 KG/M2 | WEIGHT: 194 LBS | SYSTOLIC BLOOD PRESSURE: 120 MMHG | TEMPERATURE: 96.9 F

## 2024-11-14 DIAGNOSIS — R60.0 LOWER EXTREMITY EDEMA: ICD-10-CM

## 2024-11-14 DIAGNOSIS — I50.32 CHRONIC DIASTOLIC HEART FAILURE (HCC): ICD-10-CM

## 2024-11-14 DIAGNOSIS — R60.0 LOWER EXTREMITY EDEMA: Primary | ICD-10-CM

## 2024-11-14 LAB
ALBUMIN SERPL-MCNC: 3.5 G/DL (ref 3.5–5.2)
ALBUMIN/GLOB SERPL: 1.1 {RATIO} (ref 1–2.5)
ALP SERPL-CCNC: 117 U/L (ref 35–104)
ALT SERPL-CCNC: 16 U/L (ref 10–35)
ANION GAP SERPL CALCULATED.3IONS-SCNC: 7 MMOL/L (ref 9–16)
AST SERPL-CCNC: 13 U/L (ref 10–35)
BILIRUB SERPL-MCNC: 0.3 MG/DL (ref 0–1.2)
BNP SERPL-MCNC: 572 PG/ML (ref 0–125)
BUN SERPL-MCNC: 12 MG/DL (ref 8–23)
BUN/CREAT SERPL: 17 (ref 9–20)
CALCIUM SERPL-MCNC: 8.8 MG/DL (ref 8.6–10.4)
CHLORIDE SERPL-SCNC: 105 MMOL/L (ref 98–107)
CO2 SERPL-SCNC: 30 MMOL/L (ref 20–31)
CREAT SERPL-MCNC: 0.7 MG/DL (ref 0.5–0.9)
ERYTHROCYTE [DISTWIDTH] IN BLOOD BY AUTOMATED COUNT: 14.7 % (ref 11.8–14.4)
GFR, ESTIMATED: >90 ML/MIN/1.73M2
GLUCOSE SERPL-MCNC: 160 MG/DL (ref 74–99)
HCT VFR BLD AUTO: 36.9 % (ref 36.3–47.1)
HGB BLD-MCNC: 11.4 G/DL (ref 11.9–15.1)
MCH RBC QN AUTO: 27.3 PG (ref 25.2–33.5)
MCHC RBC AUTO-ENTMCNC: 30.9 G/DL (ref 28.4–34.8)
MCV RBC AUTO: 88.5 FL (ref 82.6–102.9)
NRBC BLD-RTO: 0 PER 100 WBC
PLATELET # BLD AUTO: 297 K/UL (ref 138–453)
PMV BLD AUTO: 10.3 FL (ref 8.1–13.5)
POTASSIUM SERPL-SCNC: 3.9 MMOL/L (ref 3.7–5.3)
PROT SERPL-MCNC: 6.6 G/DL (ref 6.6–8.7)
RBC # BLD AUTO: 4.17 M/UL (ref 3.95–5.11)
SODIUM SERPL-SCNC: 142 MMOL/L (ref 136–145)
TSH SERPL DL<=0.05 MIU/L-ACNC: 2.06 UIU/ML (ref 0.27–4.2)
WBC OTHER # BLD: 6.2 K/UL (ref 3.5–11.3)

## 2024-11-14 PROCEDURE — 85027 COMPLETE CBC AUTOMATED: CPT

## 2024-11-14 PROCEDURE — 80053 COMPREHEN METABOLIC PANEL: CPT

## 2024-11-14 PROCEDURE — 83880 ASSAY OF NATRIURETIC PEPTIDE: CPT

## 2024-11-14 PROCEDURE — 84443 ASSAY THYROID STIM HORMONE: CPT

## 2024-11-14 PROCEDURE — 36415 COLL VENOUS BLD VENIPUNCTURE: CPT

## 2024-11-14 PROCEDURE — 71046 X-RAY EXAM CHEST 2 VIEWS: CPT

## 2024-11-14 RX ORDER — FUROSEMIDE 20 MG/1
20 TABLET ORAL DAILY
Qty: 7 TABLET | Refills: 0 | Status: SHIPPED | OUTPATIENT
Start: 2024-11-14 | End: 2024-11-21

## 2024-11-14 NOTE — PROGRESS NOTES
Name: Ann Bales  : 1950         Chief Complaint:     Chief Complaint   Patient presents with    Leg Swelling     -bilaterally feet, ankle and leg swelling  -started 3 days ago  -feeling like they could explode there is so much pressure on them       History of Present Illness:      Ann Bales is a 74 y.o.  female who presents with Leg Swelling (-bilaterally feet, ankle and leg swelling/-started 3 days ago/-feeling like they could explode there is so much pressure on them)      HPI    The patient presents with concerns of bilateral feet, ankle, and leg swelling that started 3 days ago. Denies redness or warmth. She states it occasionally feels as though they can \"explode\" to due to the pressure. Denies known hx of DVT. Denies SOB, cough, or fatigue. She does have compression stockings but does not wear them. Denies chest pain.  Known history of heart failure, hypertrophic cardiomyopathy, hypertension, following with [facility] cardiology.    22 echo:  Global left ventricular systolic function appears preserved with an estimated ejection fraction of 65%.  The left ventricular cavity size is within normal limits and the left ventricular wall thickness is mildly increased.  The patient has moderate assymetric septal hypertrophy without evidence of a significant resting or valsalva induced outflow tract obstruction. The left atrium is mildly dilated (29-33) with a left atrial volume index of  32 ml/m2. Normal mitral valve structure with mild mitral regurgitation. Evidence of moderate diastolic dysfunction is seen.    Past Medical History:     Past Medical History:   Diagnosis Date    Acute pain of right shoulder 2023    Arthritis     Crespo's esophagus     Chronic obstructive pulmonary disease, unspecified COPD type (HCC) 10/16/2024    Diabetes mellitus (HCC)     Epigastric pain 2021    Hypertension     Other skin changes 2024      Reviewed all health maintenance requirements

## 2024-11-15 ENCOUNTER — CARE COORDINATION (OUTPATIENT)
Dept: CARE COORDINATION | Age: 74
End: 2024-11-15

## 2024-11-15 NOTE — CARE COORDINATION
Care Transitions Note    Follow Up Call     Attempted to reach patient for transitions of care follow up.  Unable to reach patient.      Outreach Attempts:   Multiple attempts to contact patient at phone numbers on file.     Care Summary Note: Patient being followed by care transitions after her admission for abdominal pain, dehydration and hypokalemia.  Her pain had improved at discharge and she had her follow up with PCP last week.  This week she started to develop swelling to her LE, she was called by CTN who forwarded message to her PCP who she saw yesterday for the new swelling.  Orders for labs and CXR on chart, CXR was WNL, BNP was only slightly elevated.  Notes on chart show PCP wants her to take Lasix daily x 7 days, office has tried to reach patient as well. Will attempt again to reach out to patient on Monday.     Follow Up Appointment:   Future Appointments         Provider Specialty Dept Phone    11/19/2024 2:40 PM Columba Zazueta, APRN - CNP Primary Care [phone]    11/25/2024 10:30 AM Beto Tellez MD General Surgery [phone]    11/26/2024 12:00 PM SCHEDULE, [facility] [phone]    4/29/2025 11:30 AM Evangelina Mendiola, APRN - CNP Urology [phone]            Plan for follow-up call in 2-5 days based on severity of symptoms and risk factors. Plan for next call:  How is swelling? Any dyspnea? Any new n/v?    Sunshine Roberts RN

## 2024-11-18 ENCOUNTER — CARE COORDINATION (OUTPATIENT)
Dept: CARE COORDINATION | Age: 74
End: 2024-11-18

## 2024-11-18 NOTE — CARE COORDINATION
Care Transitions Note    Follow Up Call     Patient Current Location:  Home: [address] [place] [address]    Care Transition Nurse contacted the patient by telephone. Verified name and  as identifiers.    Additional needs identified to be addressed with provider   No needs identified                 Method of communication with provider: none.    Care Summary Note: Spoke with patient today for transitional follow up. Patient being followed for hypokalemia.  Last week she had developed LE edema, denies any shortness or breath, cough or other accompanying symptoms with the swelling.  She was called by CTN who messaged provider.  She was seen in office and started on Lasix for the swelling.  She says today, she has noticed improvement in the swelling from last week although still has some present.  She is not short of breath and denies any chest pains/palpitations or other issues.  She has her Medicare wellness visit tomorrow with PCP and will be seen by her again.  She denies any new needs or issues at this time.  Expressed will follow up in a week again but if she has any new issues or concerns to feel free to reach out.     Plan of care updates since last contact:  None     Advance Care Planning:   Does patient have an Advance Directive: reviewed during previous call, see note. .    Medication Review:  Medications changed since last call, reviewed today.     Remote Patient Monitoring:  Offered patient enrollment in the Remote Patient Monitoring (RPM) program for in-home monitoring: Patient is not eligible for RPM program because: patient does not have qualifying diagnosis.    Assessments:  Care Transitions Subsequent and Final Call    Schedule Follow Up Appointment with PCP: Completed  Subsequent and Final Calls  Do you have any ongoing symptoms?: Yes  Onset of Patient-reported symptoms: In the past 7 days  Patient-reported symptoms: Other  Have your medications changed?: Yes  Patient Reports: was placed on

## 2024-11-19 ENCOUNTER — OFFICE VISIT (OUTPATIENT)
Dept: PRIMARY CARE CLINIC | Age: 74
End: 2024-11-19

## 2024-11-19 VITALS
HEART RATE: 81 BPM | BODY MASS INDEX: 28.21 KG/M2 | TEMPERATURE: 96.1 F | WEIGHT: 191 LBS | SYSTOLIC BLOOD PRESSURE: 120 MMHG | OXYGEN SATURATION: 95 % | DIASTOLIC BLOOD PRESSURE: 70 MMHG

## 2024-11-19 DIAGNOSIS — M85.80 OSTEOPENIA, UNSPECIFIED LOCATION: ICD-10-CM

## 2024-11-19 DIAGNOSIS — E11.9 TYPE 2 DIABETES MELLITUS WITHOUT COMPLICATION, WITHOUT LONG-TERM CURRENT USE OF INSULIN (HCC): ICD-10-CM

## 2024-11-19 DIAGNOSIS — I50.9 CONGESTIVE HEART FAILURE, UNSPECIFIED HF CHRONICITY, UNSPECIFIED HEART FAILURE TYPE (HCC): ICD-10-CM

## 2024-11-19 DIAGNOSIS — R60.0 BILATERAL LEG EDEMA: ICD-10-CM

## 2024-11-19 DIAGNOSIS — Z01.419 GYNECOLOGIC EXAM NORMAL: ICD-10-CM

## 2024-11-19 DIAGNOSIS — I51.89 DIASTOLIC DYSFUNCTION: ICD-10-CM

## 2024-11-19 DIAGNOSIS — Z78.0 POST-MENOPAUSAL: ICD-10-CM

## 2024-11-19 DIAGNOSIS — Z00.00 MEDICARE ANNUAL WELLNESS VISIT, SUBSEQUENT: Primary | ICD-10-CM

## 2024-11-19 DIAGNOSIS — Z01.419 ENCOUNTER FOR GYNECOLOGICAL EXAMINATION: ICD-10-CM

## 2024-11-19 ASSESSMENT — PATIENT HEALTH QUESTIONNAIRE - PHQ9
4. FEELING TIRED OR HAVING LITTLE ENERGY: SEVERAL DAYS
8. MOVING OR SPEAKING SO SLOWLY THAT OTHER PEOPLE COULD HAVE NOTICED. OR THE OPPOSITE, BEING SO FIGETY OR RESTLESS THAT YOU HAVE BEEN MOVING AROUND A LOT MORE THAN USUAL: NOT AT ALL
7. TROUBLE CONCENTRATING ON THINGS, SUCH AS READING THE NEWSPAPER OR WATCHING TELEVISION: NOT AT ALL
2. FEELING DOWN, DEPRESSED OR HOPELESS: SEVERAL DAYS
9. THOUGHTS THAT YOU WOULD BE BETTER OFF DEAD, OR OF HURTING YOURSELF: NOT AT ALL
5. POOR APPETITE OR OVEREATING: NOT AT ALL
10. IF YOU CHECKED OFF ANY PROBLEMS, HOW DIFFICULT HAVE THESE PROBLEMS MADE IT FOR YOU TO DO YOUR WORK, TAKE CARE OF THINGS AT HOME, OR GET ALONG WITH OTHER PEOPLE: NOT DIFFICULT AT ALL
SUM OF ALL RESPONSES TO PHQ QUESTIONS 1-9: 2
SUM OF ALL RESPONSES TO PHQ9 QUESTIONS 1 & 2: 1
SUM OF ALL RESPONSES TO PHQ QUESTIONS 1-9: 2
SUM OF ALL RESPONSES TO PHQ QUESTIONS 1-9: 2
3. TROUBLE FALLING OR STAYING ASLEEP: NOT AT ALL
SUM OF ALL RESPONSES TO PHQ QUESTIONS 1-9: 2
1. LITTLE INTEREST OR PLEASURE IN DOING THINGS: NOT AT ALL
6. FEELING BAD ABOUT YOURSELF - OR THAT YOU ARE A FAILURE OR HAVE LET YOURSELF OR YOUR FAMILY DOWN: NOT AT ALL

## 2024-11-19 ASSESSMENT — LIFESTYLE VARIABLES
HOW OFTEN DO YOU HAVE A DRINK CONTAINING ALCOHOL: 2-4 TIMES A MONTH
HOW MANY STANDARD DRINKS CONTAINING ALCOHOL DO YOU HAVE ON A TYPICAL DAY: 3 OR 4

## 2024-11-20 ENCOUNTER — NURSE ONLY (OUTPATIENT)
Dept: CARDIOLOGY | Age: 74
End: 2024-11-20

## 2024-11-20 DIAGNOSIS — R55 SYNCOPE, UNSPECIFIED SYNCOPE TYPE: Primary | ICD-10-CM

## 2024-11-20 DIAGNOSIS — Z45.09 ENCOUNTER FOR LOOP RECORDER CHECK: ICD-10-CM

## 2024-11-20 NOTE — PROGRESS NOTES
Patient stated that she plans to discuss repeating the colonoscopy on 11/25. She stated that he said insurance may not cover it but they will further discuss on that date.   
mouth daily Yes Columba Zazueta APRN - CNP   Blood Glucose Monitoring Suppl (TRUE METRIX AIR GLUCOSE METER) w/Device KIT 1 each by Other route daily Yes Columba Zazueta APRN - CNP   TRUEplus Lancets 33G MISC 1 each by Other route daily Yes Columba Zazueta APRN - CNP   blood glucose monitor strips 1 strip by Other route daily Brand per patient preference. Test 1 times a day & as needed for symptoms of irregular blood glucose. Dispense sufficient amount for indicated testing frequency plus additional to accommodate PRN testing needs. Yes Columba Zazueta APRN - CNP   Cholecalciferol (VITAMIN D-3 PO) Take by mouth at bedtime Yes Jagruti Proctor MD   albuterol sulfate HFA (VENTOLIN HFA) 108 (90 Base) MCG/ACT inhaler Inhale 2 puffs into the lungs 4 times daily as needed for Wheezing Yes Tc Stephens MD   vitamin B-12 (CYANOCOBALAMIN) 100 MCG tablet Take 10 tablets by mouth daily Yes ProviderJagruti MD   aspirin 81 MG EC tablet Take 1 tablet by mouth daily Yes ProviderJagruti MD     Assessment:   Diagnosis Orders   1. Medicare annual wellness visit, subsequent [Z00.00]        2. Type 2 diabetes mellitus without complication, without long-term current use of insulin (HCC)  Diabetic Foot Exam    Microalbumin, Ur      3. Osteopenia, unspecified location  DEXA BONE DENSITY AXIAL SKELETON      4. Gynecologic exam normal        5. Encounter for gynecological examination  Leslie Tomlinson, [facility], Gynecology, [place]      6. Post-menopausal  DEXA BONE DENSITY AXIAL SKELETON      7. Congestive heart failure, unspecified HF chronicity, unspecified heart failure type (HCC)  Echo (TTE) complete (PRN contrast/bubble/strain/3D)      8. Diastolic dysfunction  Echo (TTE) complete (PRN contrast/bubble/strain/3D)      9. Bilateral leg edema  Vascular duplex reflux venous insufficiency study bilateral          Plan:    Wellness:  - Counseled on well-balanced diet and routine physical activity  -

## 2024-11-25 ENCOUNTER — OFFICE VISIT (OUTPATIENT)
Dept: SURGERY | Age: 74
End: 2024-11-25
Payer: MEDICARE

## 2024-11-25 VITALS
BODY MASS INDEX: 27.47 KG/M2 | DIASTOLIC BLOOD PRESSURE: 77 MMHG | SYSTOLIC BLOOD PRESSURE: 112 MMHG | WEIGHT: 186 LBS | HEART RATE: 66 BPM

## 2024-11-25 DIAGNOSIS — K22.70 BARRETT'S ESOPHAGUS WITHOUT DYSPLASIA: Primary | ICD-10-CM

## 2024-11-25 DIAGNOSIS — Z72.0 TOBACCO ABUSE: Chronic | ICD-10-CM

## 2024-11-25 DIAGNOSIS — R13.19 ESOPHAGEAL DYSPHAGIA: ICD-10-CM

## 2024-11-25 PROBLEM — Z87.440 RECENT URINARY TRACT INFECTION: Status: RESOLVED | Noted: 2024-11-03 | Resolved: 2024-11-25

## 2024-11-25 PROBLEM — E87.6 HYPOKALEMIA: Status: RESOLVED | Noted: 2024-11-03 | Resolved: 2024-11-25

## 2024-11-25 PROBLEM — R51.9 ACUTE NONINTRACTABLE HEADACHE: Status: RESOLVED | Noted: 2022-08-17 | Resolved: 2024-11-25

## 2024-11-25 PROBLEM — E86.0 DEHYDRATION, MODERATE: Status: RESOLVED | Noted: 2024-11-04 | Resolved: 2024-11-25

## 2024-11-25 PROBLEM — E43 SEVERE MALNUTRITION (HCC): Status: RESOLVED | Noted: 2024-11-04 | Resolved: 2024-11-25

## 2024-11-25 PROCEDURE — G8419 CALC BMI OUT NRM PARAM NOF/U: HCPCS | Performed by: SURGERY

## 2024-11-25 PROCEDURE — 3074F SYST BP LT 130 MM HG: CPT | Performed by: SURGERY

## 2024-11-25 PROCEDURE — G8427 DOCREV CUR MEDS BY ELIG CLIN: HCPCS | Performed by: SURGERY

## 2024-11-25 PROCEDURE — G8482 FLU IMMUNIZE ORDER/ADMIN: HCPCS | Performed by: SURGERY

## 2024-11-25 PROCEDURE — 1126F AMNT PAIN NOTED NONE PRSNT: CPT | Performed by: SURGERY

## 2024-11-25 PROCEDURE — 1090F PRES/ABSN URINE INCON ASSESS: CPT | Performed by: SURGERY

## 2024-11-25 PROCEDURE — 3017F COLORECTAL CA SCREEN DOC REV: CPT | Performed by: SURGERY

## 2024-11-25 PROCEDURE — 1123F ACP DISCUSS/DSCN MKR DOCD: CPT | Performed by: SURGERY

## 2024-11-25 PROCEDURE — 4004F PT TOBACCO SCREEN RCVD TLK: CPT | Performed by: SURGERY

## 2024-11-25 PROCEDURE — 3078F DIAST BP <80 MM HG: CPT | Performed by: SURGERY

## 2024-11-25 PROCEDURE — 99214 OFFICE O/P EST MOD 30 MIN: CPT | Performed by: SURGERY

## 2024-11-25 PROCEDURE — 1159F MED LIST DOCD IN RCRD: CPT | Performed by: SURGERY

## 2024-11-25 PROCEDURE — G8400 PT W/DXA NO RESULTS DOC: HCPCS | Performed by: SURGERY

## 2024-11-25 NOTE — PROGRESS NOTES
Name:  Ann Bales  Age:  74 y.o.   :  1950    Physician: TIFFANY BUTLER MD       Chief Complaint: Dysphagia and History Crespo's      HPI:  Over the past month or so patient has noticed increasing difficulty with swallowing certain foods, particular bread.  She has known Crespo's esophagus and had a Nissen fundoplication on .  EGD in  did not show Crespo's, however we do have pathology reports to  and  which did show Crespo's.  Short segment Crespo's without dyplasia. Crespo's was also one the indication for fundoplication.  She also reports have esophageal dilation in the past.    Also question of a need for a colonoscopy. She had [facility] colonoscopy in .        MEDICAL HISTORY:    Past Medical History:        Diagnosis Date    Acute pain of right shoulder 2023    Arthritis     Crespo's esophagus     Chronic obstructive pulmonary disease, unspecified COPD type (HCC) 10/16/2024    Cryptogenic stroke (HCC) 2022    Diabetes mellitus (HCC)     Epigastric pain 2021    Hypertension     Hypertrophic cardiomyopathy (HCC) 10/16/2024    Other skin changes 2024    Spinal stenosis of lumbar region at multiple levels 2019    Tobacco abuse 2016       Past Surgical History:        Procedure Laterality Date    APPENDECTOMY  1969    BACK SURGERY      upper disc    BACK SURGERY      protruded disc    CHOLECYSTECTOMY      1988    COLONOSCOPY  2017    COLONOSCOPY  2021    Dr. Richardson - normal colonoscopy    COLONOSCOPY N/A 2021    COLORECTAL CANCER SCREENING, NOT HIGH RISK performed by Misael Richardson MD at [facility] OR    CYST REMOVAL  1965    pilonidal    ENDOSCOPY, COLON, DIAGNOSTIC  2019    GASTRIC FUNDOPLICATION  2006    HYSTERECTOMY (CERVIX STATUS UNKNOWN)  1998    still has ovaries    JOINT REPLACEMENT Left 2015    HIP    JOINT REPLACEMENT Right     HIP    KNEE ARTHROSCOPY Bilateral 2000    LUMBAR FUSION N/A 2019    HARDWARE

## 2024-11-25 NOTE — PATIENT INSTRUCTIONS
SURVEY:    You may be receiving a survey from [facility] regarding your visit today.    You may get this in the mail, through your MyChart, or in your email.     Please complete the survey to enable us to provide the highest quality of care to you and your family.    If you cannot score us a very good (5 Stars) on any question, please call the office to discuss how we could of made your experience exceptional.    Thank you!    MD Dr. Olena Lay, DO  Dr. Tomas Correia, DO    Dr. Danny Carlson, DO    MD Deana Boyle, APRN-MARC Madden, JOSE CARL[facility] LPN Jena Adams, MA Emily Akers, MA    Phone: [phone]  Fax: [phone]    Office Hours:   M-TH 8-5, F: 8-12

## 2024-11-26 ENCOUNTER — CARE COORDINATION (OUTPATIENT)
Dept: CARE COORDINATION | Age: 74
End: 2024-11-26

## 2024-11-26 NOTE — CARE COORDINATION
Care Transitions Note    Follow Up Call     Patient Current Location:  Home: [address] [place] [address] [place] [address]    Care Transition Nurse contacted the patient by telephone. Verified name and  as identifiers.    Additional needs identified to be addressed with provider   No needs identified                 Method of communication with provider: none.    Care Summary Note: Spoke with patient for transitional follow up call. Patient had come to the hospital with dehydration and UTI, discharged to home.  Over a week ago she developed some swelling to bilateral LE, was placed on Lasix.  Last week when we spoke, she had some reduction in the swelling to her legs however it was still present.  This week she said the swelling is gone. She continues on the Lasix yet but once supply is done she will no longer take.  She has been feeling good this week, has had no real significant problems with swallowing, she did follow up with GI and plans are for EGD on .  They will get biopsies at that time, they do not plan on doing another dilation, she had this done earlier this year, has h/o Crespo's esophagus. She will have issues with larger bulky food like bread.  Patient doing well, denies any new needs or issues at this time. Expressed will reach out again next week but of any needs to feel free to call.     Plan of care updates since last contact:  None       Advance Care Planning:   Does patient have an Advance Directive: reviewed during previous call, see note. .    Medication Review:  Full medication reconciliation completed during previous call.    Remote Patient Monitoring:  Offered patient enrollment in the Remote Patient Monitoring (RPM) program for in-home monitoring: Patient is not eligible for RPM program because: patient does not have qualifying diagnosis.    Assessments:  Care Transitions Subsequent and Final Call    Schedule Follow Up Appointment with PCP: Completed  Subsequent and Final Calls  Do you have

## 2024-12-02 ENCOUNTER — TELEPHONE (OUTPATIENT)
Dept: PREADMISSION TESTING | Age: 74
End: 2024-12-02

## 2024-12-02 NOTE — TELEPHONE ENCOUNTER
Patient is scheduled on Friday, 12/6 for EGD with Dr Tellez. Please review recent EKG. It does look like she was admitted for hypokalemia at the time of her EKG. Her most recent (11/14) potassium was 3.9. She has followed up with her PCP, but looks like she hasn't seen a cardiology provider since November 2023, although she does have routine loop recorder device checks. She also has a few outstanding echo orders. She did have this procedure done in 2021, but she has a pretty extensive history. Please review. Thank you!

## 2024-12-02 NOTE — TELEPHONE ENCOUNTER
Reviewed. Patient had ECHO ordered in October of 2023 that she never completed. Cardiology also requested she follow up 6 months from that visit and this was also not done. Patient has mild COPD but continues to have cardiovascular symptoms that could indicate other underlying issues. SOB, lower extremity swelling, elevated BNP. PCP re-ordered ECHO. If EGD is not urgent, please reschedule once patient has completed ECHO and had cardiology follow up/clearance.

## 2024-12-03 ENCOUNTER — OFFICE VISIT (OUTPATIENT)
Dept: PRIMARY CARE CLINIC | Age: 74
End: 2024-12-03
Payer: MEDICARE

## 2024-12-03 ENCOUNTER — TELEPHONE (OUTPATIENT)
Dept: PREADMISSION TESTING | Age: 74
End: 2024-12-03

## 2024-12-03 VITALS
TEMPERATURE: 96.2 F | HEART RATE: 67 BPM | WEIGHT: 187 LBS | OXYGEN SATURATION: 97 % | SYSTOLIC BLOOD PRESSURE: 120 MMHG | BODY MASS INDEX: 27.22 KG/M2 | DIASTOLIC BLOOD PRESSURE: 80 MMHG

## 2024-12-03 DIAGNOSIS — M54.50 CHRONIC MIDLINE LOW BACK PAIN WITHOUT SCIATICA: Primary | ICD-10-CM

## 2024-12-03 DIAGNOSIS — G89.29 CHRONIC MIDLINE LOW BACK PAIN WITHOUT SCIATICA: Primary | ICD-10-CM

## 2024-12-03 DIAGNOSIS — R60.0 BILATERAL LEG EDEMA: ICD-10-CM

## 2024-12-03 PROCEDURE — G8427 DOCREV CUR MEDS BY ELIG CLIN: HCPCS | Performed by: NURSE PRACTITIONER

## 2024-12-03 PROCEDURE — 4004F PT TOBACCO SCREEN RCVD TLK: CPT | Performed by: NURSE PRACTITIONER

## 2024-12-03 PROCEDURE — G8482 FLU IMMUNIZE ORDER/ADMIN: HCPCS | Performed by: NURSE PRACTITIONER

## 2024-12-03 PROCEDURE — 1090F PRES/ABSN URINE INCON ASSESS: CPT | Performed by: NURSE PRACTITIONER

## 2024-12-03 PROCEDURE — 3079F DIAST BP 80-89 MM HG: CPT | Performed by: NURSE PRACTITIONER

## 2024-12-03 PROCEDURE — 3017F COLORECTAL CA SCREEN DOC REV: CPT | Performed by: NURSE PRACTITIONER

## 2024-12-03 PROCEDURE — G8400 PT W/DXA NO RESULTS DOC: HCPCS | Performed by: NURSE PRACTITIONER

## 2024-12-03 PROCEDURE — 99214 OFFICE O/P EST MOD 30 MIN: CPT | Performed by: NURSE PRACTITIONER

## 2024-12-03 PROCEDURE — G8419 CALC BMI OUT NRM PARAM NOF/U: HCPCS | Performed by: NURSE PRACTITIONER

## 2024-12-03 PROCEDURE — 3074F SYST BP LT 130 MM HG: CPT | Performed by: NURSE PRACTITIONER

## 2024-12-03 PROCEDURE — 1159F MED LIST DOCD IN RCRD: CPT | Performed by: NURSE PRACTITIONER

## 2024-12-03 PROCEDURE — 1123F ACP DISCUSS/DSCN MKR DOCD: CPT | Performed by: NURSE PRACTITIONER

## 2024-12-03 NOTE — TELEPHONE ENCOUNTER
Per Anesthesia patient needs cardiac clearance for her upcoming procedure on 12/6/24 with Dr. Tellez. See notes in chart. I left a message at Dr. Tellez's office regarding this 12/3/24 @ 7539 a.m.

## 2024-12-03 NOTE — PROGRESS NOTES
Name: Ann Bales  : 1950         Chief Complaint:     Chief Complaint   Patient presents with    Follow-up     BLE edema f/u and chronic back pain / May be due for Diabetic eye exam and diabetic urine uACR test (Microalbumin / Creatinine Urine Ratio)       History of Present Illness:      Ann Bales is a 74 y.o.  female who presents with Follow-up (BLE edema f/u and chronic back pain / May be due for Diabetic eye exam and diabetic urine uACR test (Microalbumin / Creatinine Urine Ratio))      HPI    BLE edema:  Patient presents for follow-up of BLE edema.  She was started on short course of Lasix 20 mg daily on 2024.  She was ordered vascular duplex reflux insufficiency study bilateral lower extremities and has this scheduled 2025.  Most recent echo 2022 showed moderate diastolic dysfunction.  She was ordered repeat echocardiogram and this is scheduled for this month.  2024 . Today, she states her lower leg swelling is much better when compared to her most previous visit. She is wearing compression stockings. She has decreased salt intake. No longer taking lasix.     Lower back pain:  The patient has suffered from back pain for several years. Pain is worsened with standing and bending. She was taking motrin in the past with benefit. Denies pain in the legs. She does endorse a history of BLE neuropathy which she has had for \"many years\" and was previously following with neurology Dr. Shelton. She does take gabapentin 800mg BID which is helpful with the neurology. She believes the neurologist started her on this medication. 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 She has had \"back surgery\" in the past. She believes she saw Dr. Benavidez (ortho spine) in the past.     Past Medical History:     Past Medical History:   Diagnosis Date    Acute pain of right shoulder 2023

## 2024-12-03 NOTE — TELEPHONE ENCOUNTER
Left message for patient to return call regarding rescheduling procedure for after cardiac testing and clearance has been completed

## 2024-12-03 NOTE — TELEPHONE ENCOUNTER
Spoke to Tara, she is cancelling procedure. She left a message with the patient. Will reschedule after cardiac clearance is obtained.

## 2024-12-05 ENCOUNTER — CARE COORDINATION (OUTPATIENT)
Dept: CARE COORDINATION | Age: 74
End: 2024-12-05

## 2024-12-05 NOTE — CARE COORDINATION
Care Transitions Note    Final Call     Patient Current Location:  Home: [address] [place] [address] [place] [address]    [facility] Care Coordinator contacted the patient by telephone. Verified name and  as identifiers.    Patient graduated from the Care Transitions program on 24.  Patient/family verbalizes confidence in the ability to self-manage at this time..      Advance Care Planning:   Does patient have an Advance Directive: reviewed during previous call, see note. .    Handoff:   Patient was not referred to the ACM team due to patient declined services.      Care Summary Note: Writer spoke to [place] for follow up call. She stated she is doing good and back to normal. She does not have any issues with swallowing so far today. She denies any urinary symptoms of a UTI. She denies swelling in legs. She stated she has her EGD tomorrow. Office visited completed with pcp on 12/3 order given for X-ray she stated that's on hold for now I have too many appointments at this time. No medication changes at appointment. Reviewed upcoming appointments. Dexa Scan, echo and cardio device check all schedule. She voiced she does have any further needs declined ACM referral. She does agree to reach out to pcp should concerns arise.     Assessments:  Care Transitions Subsequent and Final Call    Subsequent and Final Calls  Do you have any ongoing symptoms?: No  Have your medications changed?: No  Do you have any questions related to your medications?: No  Do you currently have any active services?: No  Do you have any needs or concerns that I can assist you with?: No  Care Transitions Interventions  Other Interventions:              Upcoming Appointments:    Future Appointments         Provider Specialty Dept Phone    2024 9:00 AM [facility] MAMMOGRAPHY TECHNOLOGIST; [facility] DEXA  Radiology [phone]    2024 9:30 AM (Arrive by 9:15 AM) [facility] ECHO 1 Cardiology [phone]    2025 9:00 AM (Arrive by 8:45 AM) [facility] VASCULAR LAB

## 2024-12-05 NOTE — TELEPHONE ENCOUNTER
Spoke to patient who was unsure why procedure was cancelled. I explained that message was left regarding cardiac clearance. Patient states she will call Dr Hyatt's office and get scheduled for cardiac clearance. Will notify office once she sees him.

## 2024-12-06 ENCOUNTER — OFFICE VISIT (OUTPATIENT)
Dept: CARDIOLOGY | Age: 74
End: 2024-12-06
Payer: MEDICARE

## 2024-12-06 VITALS
OXYGEN SATURATION: 96 % | HEART RATE: 63 BPM | BODY MASS INDEX: 26.8 KG/M2 | DIASTOLIC BLOOD PRESSURE: 73 MMHG | RESPIRATION RATE: 16 BRPM | WEIGHT: 187.2 LBS | SYSTOLIC BLOOD PRESSURE: 113 MMHG | HEIGHT: 70 IN

## 2024-12-06 DIAGNOSIS — R06.02 SOB (SHORTNESS OF BREATH): ICD-10-CM

## 2024-12-06 DIAGNOSIS — R07.89 ATYPICAL CHEST PAIN: ICD-10-CM

## 2024-12-06 DIAGNOSIS — Z01.818 PRE-OP EVALUATION: Primary | ICD-10-CM

## 2024-12-06 DIAGNOSIS — Z71.6 TOBACCO ABUSE COUNSELING: ICD-10-CM

## 2024-12-06 DIAGNOSIS — R55 SYNCOPE, UNSPECIFIED SYNCOPE TYPE: ICD-10-CM

## 2024-12-06 DIAGNOSIS — I42.2 HYPERTROPHIC CARDIOMYOPATHY (HCC): ICD-10-CM

## 2024-12-06 DIAGNOSIS — I63.9 CRYPTOGENIC STROKE (HCC): ICD-10-CM

## 2024-12-06 DIAGNOSIS — Z45.09 ENCOUNTER FOR LOOP RECORDER CHECK: ICD-10-CM

## 2024-12-06 DIAGNOSIS — E78.2 MIXED HYPERLIPIDEMIA: ICD-10-CM

## 2024-12-06 DIAGNOSIS — I10 ESSENTIAL HYPERTENSION: ICD-10-CM

## 2024-12-06 PROCEDURE — G8419 CALC BMI OUT NRM PARAM NOF/U: HCPCS | Performed by: PHYSICIAN ASSISTANT

## 2024-12-06 PROCEDURE — 1159F MED LIST DOCD IN RCRD: CPT | Performed by: PHYSICIAN ASSISTANT

## 2024-12-06 PROCEDURE — 3017F COLORECTAL CA SCREEN DOC REV: CPT | Performed by: PHYSICIAN ASSISTANT

## 2024-12-06 PROCEDURE — 3074F SYST BP LT 130 MM HG: CPT | Performed by: PHYSICIAN ASSISTANT

## 2024-12-06 PROCEDURE — G8427 DOCREV CUR MEDS BY ELIG CLIN: HCPCS | Performed by: PHYSICIAN ASSISTANT

## 2024-12-06 PROCEDURE — 3078F DIAST BP <80 MM HG: CPT | Performed by: PHYSICIAN ASSISTANT

## 2024-12-06 PROCEDURE — 1123F ACP DISCUSS/DSCN MKR DOCD: CPT | Performed by: PHYSICIAN ASSISTANT

## 2024-12-06 PROCEDURE — 93000 ELECTROCARDIOGRAM COMPLETE: CPT | Performed by: PHYSICIAN ASSISTANT

## 2024-12-06 PROCEDURE — 99214 OFFICE O/P EST MOD 30 MIN: CPT | Performed by: PHYSICIAN ASSISTANT

## 2024-12-06 PROCEDURE — G8400 PT W/DXA NO RESULTS DOC: HCPCS | Performed by: PHYSICIAN ASSISTANT

## 2024-12-06 PROCEDURE — 1090F PRES/ABSN URINE INCON ASSESS: CPT | Performed by: PHYSICIAN ASSISTANT

## 2024-12-06 PROCEDURE — G8482 FLU IMMUNIZE ORDER/ADMIN: HCPCS | Performed by: PHYSICIAN ASSISTANT

## 2024-12-06 PROCEDURE — 4004F PT TOBACCO SCREEN RCVD TLK: CPT | Performed by: PHYSICIAN ASSISTANT

## 2024-12-06 NOTE — PROGRESS NOTES
possible mini strokes, and a loop recorder in place for atrial fibrillation detection. No atrial fibrillation was observed on her loop recorder, with the most recent download reviewed from 11/20/2024. She has a history of syncope in the context of hypertrophic cardiomyopathy, but no dangerous rhythms were detected on the loop recorder. There have been no recent syncopal episodes, lightheadedness, or dizziness. Her weight is stable, and her blood pressure and heart rate are within normal limits. Her EKG shows a normal sinus rhythm with a heart rate of 64. She has no history of major strokes, is not on insulin for diabetes, and has normal kidney function. She is advised to maintain hydration, consume 1 to 2 g of sodium or 1 to 2 L of sports drink daily, wear knee-high compression socks, and consume 3 g of sodium daily. She will continue Lopressor 50 mg twice daily and Lipitor 10 mg daily. She is due for a repeat echo, which is scheduled for 12/28/2024. She will continue aspirin for her history of mini strokes and monitoring with the loop recorder. For her preoperative clearance, she can perform greater than 4 METs. She is at low risk for the procedure and may continue as planned. Prior to her procedure, she should stop her aspirin 81 mg daily 5 to 7 days before the procedure. She will continue her Lipitor 10 mg and Lopressor 50 mg twice daily throughout the procedure. She is not on any blood thinners, so no bridging is needed, but she can continue her beta-blocker and cholesterol medication. As far as evaluation prior to the surgery, she had a CBC, BNP, and CMP done on 11/14/2024, which were fairly unremarkable. No further evaluation is needed prior to completing her EGD. She will contact us if there are any changes or worsening of her condition.    2. Hypertrophic cardiomyopathy.  Continue Lopressor 50 mg twice daily. She is encouraged to stay well hydrated. She is due for a repeat echo, which will be conducted

## 2024-12-09 DIAGNOSIS — R13.19 ESOPHAGEAL DYSPHAGIA: ICD-10-CM

## 2024-12-09 PROBLEM — K22.70 BARRETT ESOPHAGUS: Status: ACTIVE | Noted: 2024-12-09

## 2024-12-09 RX ORDER — ATORVASTATIN CALCIUM 10 MG/1
10 TABLET, FILM COATED ORAL DAILY
Qty: 90 TABLET | Refills: 1 | Status: SHIPPED | OUTPATIENT
Start: 2024-12-09

## 2024-12-12 ENCOUNTER — HOSPITAL ENCOUNTER (OUTPATIENT)
Age: 74
Discharge: HOME | End: 2024-12-14
Payer: MEDICARE

## 2024-12-12 ENCOUNTER — OFFICE VISIT (OUTPATIENT)
Dept: PRIMARY CARE CLINIC | Age: 74
End: 2024-12-12
Payer: MEDICARE

## 2024-12-12 ENCOUNTER — HOSPITAL ENCOUNTER (OUTPATIENT)
Dept: GENERAL RADIOLOGY | Age: 74
Discharge: HOME | End: 2024-12-14
Payer: MEDICARE

## 2024-12-12 VITALS
SYSTOLIC BLOOD PRESSURE: 132 MMHG | OXYGEN SATURATION: 97 % | HEART RATE: 64 BPM | WEIGHT: 185.4 LBS | DIASTOLIC BLOOD PRESSURE: 82 MMHG | BODY MASS INDEX: 26.99 KG/M2

## 2024-12-12 DIAGNOSIS — M54.50 ACUTE BILATERAL LOW BACK PAIN WITHOUT SCIATICA: Primary | ICD-10-CM

## 2024-12-12 DIAGNOSIS — M54.50 CHRONIC MIDLINE LOW BACK PAIN WITHOUT SCIATICA: ICD-10-CM

## 2024-12-12 DIAGNOSIS — G89.29 CHRONIC MIDLINE LOW BACK PAIN WITHOUT SCIATICA: ICD-10-CM

## 2024-12-12 PROCEDURE — 1159F MED LIST DOCD IN RCRD: CPT | Performed by: NURSE PRACTITIONER

## 2024-12-12 PROCEDURE — 3075F SYST BP GE 130 - 139MM HG: CPT | Performed by: NURSE PRACTITIONER

## 2024-12-12 PROCEDURE — 1123F ACP DISCUSS/DSCN MKR DOCD: CPT | Performed by: NURSE PRACTITIONER

## 2024-12-12 PROCEDURE — G8419 CALC BMI OUT NRM PARAM NOF/U: HCPCS | Performed by: NURSE PRACTITIONER

## 2024-12-12 PROCEDURE — 99214 OFFICE O/P EST MOD 30 MIN: CPT | Performed by: NURSE PRACTITIONER

## 2024-12-12 PROCEDURE — 3017F COLORECTAL CA SCREEN DOC REV: CPT | Performed by: NURSE PRACTITIONER

## 2024-12-12 PROCEDURE — G8400 PT W/DXA NO RESULTS DOC: HCPCS | Performed by: NURSE PRACTITIONER

## 2024-12-12 PROCEDURE — G8482 FLU IMMUNIZE ORDER/ADMIN: HCPCS | Performed by: NURSE PRACTITIONER

## 2024-12-12 PROCEDURE — 72100 X-RAY EXAM L-S SPINE 2/3 VWS: CPT

## 2024-12-12 PROCEDURE — G8427 DOCREV CUR MEDS BY ELIG CLIN: HCPCS | Performed by: NURSE PRACTITIONER

## 2024-12-12 PROCEDURE — 3079F DIAST BP 80-89 MM HG: CPT | Performed by: NURSE PRACTITIONER

## 2024-12-12 PROCEDURE — 4004F PT TOBACCO SCREEN RCVD TLK: CPT | Performed by: NURSE PRACTITIONER

## 2024-12-12 PROCEDURE — 1160F RVW MEDS BY RX/DR IN RCRD: CPT | Performed by: NURSE PRACTITIONER

## 2024-12-12 PROCEDURE — 1090F PRES/ABSN URINE INCON ASSESS: CPT | Performed by: NURSE PRACTITIONER

## 2024-12-12 RX ORDER — METHYLPREDNISOLONE 4 MG/1
TABLET ORAL
Qty: 1 KIT | Refills: 0 | Status: SHIPPED | OUTPATIENT
Start: 2024-12-12 | End: 2024-12-18

## 2024-12-12 SDOH — ECONOMIC STABILITY: FOOD INSECURITY: WITHIN THE PAST 12 MONTHS, THE FOOD YOU BOUGHT JUST DIDN'T LAST AND YOU DIDN'T HAVE MONEY TO GET MORE.: NEVER TRUE

## 2024-12-12 SDOH — ECONOMIC STABILITY: FOOD INSECURITY: WITHIN THE PAST 12 MONTHS, YOU WORRIED THAT YOUR FOOD WOULD RUN OUT BEFORE YOU GOT MONEY TO BUY MORE.: NEVER TRUE

## 2024-12-12 SDOH — ECONOMIC STABILITY: INCOME INSECURITY: HOW HARD IS IT FOR YOU TO PAY FOR THE VERY BASICS LIKE FOOD, HOUSING, MEDICAL CARE, AND HEATING?: NOT HARD AT ALL

## 2024-12-12 ASSESSMENT — ANXIETY QUESTIONNAIRES
2. NOT BEING ABLE TO STOP OR CONTROL WORRYING: NOT AT ALL
7. FEELING AFRAID AS IF SOMETHING AWFUL MIGHT HAPPEN: NOT AT ALL
1. FEELING NERVOUS, ANXIOUS, OR ON EDGE: SEVERAL DAYS
6. BECOMING EASILY ANNOYED OR IRRITABLE: NOT AT ALL
5. BEING SO RESTLESS THAT IT IS HARD TO SIT STILL: NOT AT ALL
3. WORRYING TOO MUCH ABOUT DIFFERENT THINGS: SEVERAL DAYS
GAD7 TOTAL SCORE: 4
IF YOU CHECKED OFF ANY PROBLEMS ON THIS QUESTIONNAIRE, HOW DIFFICULT HAVE THESE PROBLEMS MADE IT FOR YOU TO DO YOUR WORK, TAKE CARE OF THINGS AT HOME, OR GET ALONG WITH OTHER PEOPLE: VERY DIFFICULT
4. TROUBLE RELAXING: MORE THAN HALF THE DAYS

## 2024-12-12 ASSESSMENT — PATIENT HEALTH QUESTIONNAIRE - PHQ9
6. FEELING BAD ABOUT YOURSELF - OR THAT YOU ARE A FAILURE OR HAVE LET YOURSELF OR YOUR FAMILY DOWN: SEVERAL DAYS
2. FEELING DOWN, DEPRESSED OR HOPELESS: SEVERAL DAYS
3. TROUBLE FALLING OR STAYING ASLEEP: NOT AT ALL
9. THOUGHTS THAT YOU WOULD BE BETTER OFF DEAD, OR OF HURTING YOURSELF: NOT AT ALL
10. IF YOU CHECKED OFF ANY PROBLEMS, HOW DIFFICULT HAVE THESE PROBLEMS MADE IT FOR YOU TO DO YOUR WORK, TAKE CARE OF THINGS AT HOME, OR GET ALONG WITH OTHER PEOPLE: VERY DIFFICULT
SUM OF ALL RESPONSES TO PHQ9 QUESTIONS 1 & 2: 2
5. POOR APPETITE OR OVEREATING: NOT AT ALL
SUM OF ALL RESPONSES TO PHQ QUESTIONS 1-9: 7
7. TROUBLE CONCENTRATING ON THINGS, SUCH AS READING THE NEWSPAPER OR WATCHING TELEVISION: NOT AT ALL
SUM OF ALL RESPONSES TO PHQ QUESTIONS 1-9: 7
SUM OF ALL RESPONSES TO PHQ QUESTIONS 1-9: 7
1. LITTLE INTEREST OR PLEASURE IN DOING THINGS: SEVERAL DAYS
8. MOVING OR SPEAKING SO SLOWLY THAT OTHER PEOPLE COULD HAVE NOTICED. OR THE OPPOSITE, BEING SO FIGETY OR RESTLESS THAT YOU HAVE BEEN MOVING AROUND A LOT MORE THAN USUAL: NEARLY EVERY DAY
4. FEELING TIRED OR HAVING LITTLE ENERGY: SEVERAL DAYS
SUM OF ALL RESPONSES TO PHQ QUESTIONS 1-9: 7

## 2024-12-12 ASSESSMENT — ENCOUNTER SYMPTOMS
DIARRHEA: 0
BACK PAIN: 1
NAUSEA: 0
BOWEL INCONTINENCE: 0
ABDOMINAL PAIN: 0
VOMITING: 0

## 2024-12-12 NOTE — PROGRESS NOTES
Columba Zazueta APRN - CNP   blood glucose monitor strips 1 strip by Other route daily Brand per patient preference. Test 1 times a day & as needed for symptoms of irregular blood glucose. Dispense sufficient amount for indicated testing frequency plus additional to accommodate PRN testing needs.  Columba Zazueta APRN - CNP   albuterol sulfate HFA (VENTOLIN HFA) 108 (90 Base) MCG/ACT inhaler Inhale 2 puffs into the lungs 4 times daily as needed for Wheezing  Patient not taking: Reported on 12/12/2024  Tc Stephens MD      Social History     Tobacco Use    Smoking status: Every Day     Current packs/day: 0.50     Average packs/day: 0.5 packs/day for 50.0 years (25.0 ttl pk-yrs)     Types: Cigarettes    Smokeless tobacco: Never   Substance Use Topics    Alcohol use: Yes     Comment: Occasionally      Vitals:    12/12/24 1446   BP: 132/82   Pulse: 64   SpO2: 97%   Weight: 84.1 kg (185 lb 6.4 oz)     Estimated body mass index is 26.99 kg/m² as calculated from the following:    Height as of 12/6/24: 1.765 m (5' 9.5\").    Weight as of this encounter: 84.1 kg (185 lb 6.4 oz).    Review of Systems   Constitutional:  Negative for fever.   Cardiovascular:  Negative for chest pain.   Gastrointestinal:  Negative for abdominal pain, bowel incontinence, diarrhea, nausea and vomiting.   Genitourinary:  Negative for bladder incontinence, dysuria and pelvic pain.   Musculoskeletal:  Positive for arthralgias, back pain and myalgias. Negative for neck pain and neck stiffness.   Neurological:  Negative for dizziness, tingling, weakness, light-headedness, numbness, headaches and paresthesias.     Physical Exam  Vitals and nursing note reviewed.   Constitutional:       General: She is not in acute distress.     Appearance: She is not ill-appearing, toxic-appearing or diaphoretic.   Musculoskeletal:      Lumbar back: Tenderness present. No swelling, edema, deformity, signs of trauma, lacerations, spasms or bony tenderness.

## 2024-12-16 RX ORDER — POTASSIUM CHLORIDE 1500 MG/1
20 TABLET, EXTENDED RELEASE ORAL DAILY
Qty: 90 TABLET | Refills: 0 | Status: SHIPPED | OUTPATIENT
Start: 2024-12-16

## 2024-12-18 ENCOUNTER — HOSPITAL ENCOUNTER (OUTPATIENT)
Age: 74
Discharge: HOME | End: 2024-12-20
Payer: MEDICARE

## 2024-12-18 ENCOUNTER — HOSPITAL ENCOUNTER (OUTPATIENT)
Dept: WOMENS IMAGING | Age: 74
Discharge: HOME | End: 2024-12-20
Payer: MEDICARE

## 2024-12-18 VITALS
BODY MASS INDEX: 27.4 KG/M2 | HEIGHT: 69 IN | WEIGHT: 185 LBS | SYSTOLIC BLOOD PRESSURE: 132 MMHG | DIASTOLIC BLOOD PRESSURE: 82 MMHG

## 2024-12-18 DIAGNOSIS — I50.9 CONGESTIVE HEART FAILURE, UNSPECIFIED HF CHRONICITY, UNSPECIFIED HEART FAILURE TYPE (HCC): ICD-10-CM

## 2024-12-18 DIAGNOSIS — I51.89 DIASTOLIC DYSFUNCTION: ICD-10-CM

## 2024-12-18 DIAGNOSIS — M85.80 OSTEOPENIA, UNSPECIFIED LOCATION: ICD-10-CM

## 2024-12-18 DIAGNOSIS — Z78.0 POST-MENOPAUSAL: ICD-10-CM

## 2024-12-18 LAB
ECHO AO SINUS VALSALVA DIAM: 3.5 CM
ECHO AO SINUS VALSALVA INDEX: 1.74 CM/M2
ECHO AO ST JNCT DIAM: 2.7 CM
ECHO AV CUSP MM: 2 CM
ECHO AV MEAN GRADIENT: 3 MMHG
ECHO AV MEAN VELOCITY: 0.8 M/S
ECHO AV PEAK GRADIENT: 5 MMHG
ECHO AV PEAK VELOCITY: 1.2 M/S
ECHO AV VELOCITY RATIO: 0.92
ECHO AV VTI: 28.5 CM
ECHO BSA: 2.03 M2
ECHO LA AREA 2C: 24.3 CM2
ECHO LA AREA 4C: 20.9 CM2
ECHO LA MAJOR AXIS: 5.8 CM
ECHO LA MINOR AXIS: 5.7 CM
ECHO LA VOL BP: 73 ML (ref 22–52)
ECHO LA VOL MOD A2C: 85 ML (ref 22–52)
ECHO LA VOL MOD A4C: 62 ML (ref 22–52)
ECHO LA VOL/BSA BIPLANE: 36 ML/M2 (ref 16–34)
ECHO LA VOLUME INDEX MOD A2C: 42 ML/M2 (ref 16–34)
ECHO LA VOLUME INDEX MOD A4C: 31 ML/M2 (ref 16–34)
ECHO LV E' LATERAL VELOCITY: 10.6 CM/S
ECHO LV EDV A2C: 82 ML
ECHO LV EDV A4C: 75 ML
ECHO LV EDV INDEX A4C: 37 ML/M2
ECHO LV EDV NDEX A2C: 41 ML/M2
ECHO LV EJECTION FRACTION A2C: 58 %
ECHO LV EJECTION FRACTION A4C: 58 %
ECHO LV EJECTION FRACTION BIPLANE: 59 % (ref 55–100)
ECHO LV ESV A2C: 35 ML
ECHO LV ESV A4C: 31 ML
ECHO LV ESV INDEX A2C: 17 ML/M2
ECHO LV ESV INDEX A4C: 15 ML/M2
ECHO LV FRACTIONAL SHORTENING: 37 % (ref 28–44)
ECHO LV INTERNAL DIMENSION DIASTOLE INDEX: 2.29 CM/M2
ECHO LV INTERNAL DIMENSION DIASTOLIC: 4.6 CM (ref 3.9–5.3)
ECHO LV INTERNAL DIMENSION SYSTOLIC INDEX: 1.44 CM/M2
ECHO LV INTERNAL DIMENSION SYSTOLIC: 2.9 CM
ECHO LV IVSD: 1.4 CM (ref 0.6–0.9)
ECHO LV MASS 2D: 205 G (ref 67–162)
ECHO LV MASS INDEX 2D: 102 G/M2 (ref 43–95)
ECHO LV POSTERIOR WALL DIASTOLIC: 1 CM (ref 0.6–0.9)
ECHO LV RELATIVE WALL THICKNESS RATIO: 0.43
ECHO LVOT AV VTI INDEX: 0.97
ECHO LVOT MEAN GRADIENT: 2 MMHG
ECHO LVOT PEAK GRADIENT: 5 MMHG
ECHO LVOT PEAK VELOCITY: 1.1 M/S
ECHO LVOT VTI: 27.7 CM
ECHO MV A VELOCITY: 0.89 M/S
ECHO MV E DECELERATION TIME (DT): 250 MS
ECHO MV E VELOCITY: 1.04 M/S
ECHO MV E/A RATIO: 1.17
ECHO MV E/E' LATERAL: 9.81
ECHO PV MAX VELOCITY: 1 M/S
ECHO PV PEAK GRADIENT: 4 MMHG

## 2024-12-18 PROCEDURE — 93306 TTE W/DOPPLER COMPLETE: CPT | Performed by: FAMILY MEDICINE

## 2024-12-18 PROCEDURE — 77080 DXA BONE DENSITY AXIAL: CPT

## 2024-12-18 PROCEDURE — 93306 TTE W/DOPPLER COMPLETE: CPT

## 2024-12-18 PROCEDURE — 77081 DXA BONE DENSITY APPENDICULR: CPT

## 2025-01-06 ENCOUNTER — HOSPITAL ENCOUNTER (OUTPATIENT)
Dept: VASCULAR LAB | Age: 75
Discharge: HOME | End: 2025-01-08
Payer: MEDICARE

## 2025-01-06 DIAGNOSIS — R60.0 BILATERAL LEG EDEMA: ICD-10-CM

## 2025-01-06 PROCEDURE — 93970 EXTREMITY STUDY: CPT

## 2025-01-07 LAB
VAS LEFT GSV ANKLE DIAM: 2 MM
VAS LEFT GSV ANKLE RFX: 0 S
VAS LEFT GSV AT KNEE DIAM: 3.2 MM
VAS LEFT GSV AT KNEE RFX: 0 S
VAS LEFT GSV BK MID DIAM: 2.2 MM
VAS LEFT GSV BK MID RFX: 0 S
VAS LEFT GSV BK PROX DIAM: 2.8 MM
VAS LEFT GSV BK PROX RFX: 0 S
VAS LEFT GSV JUNC DIAM: 4.5 MM
VAS LEFT GSV JUNC RFX: 0 S
VAS LEFT GSV THIGH MID DIAM: 3.4 MM
VAS LEFT GSV THIGH PROX DIAM: 3.6 MM
VAS LEFT GSV THIGH PROX RFX: 0 S
VAS LEFT GSV THIGHT MID RFX: 0.7 S
VAS LEFT SSV PROX DIAM: 1.33 MM
VAS LEFT SSV PROX RFX: 0 S
VAS RIGHT GSV AK RFX: 0 S
VAS RIGHT GSV ANKLE DIAM: 2.6 MM
VAS RIGHT GSV ANKLE RFX: 0 S
VAS RIGHT GSV AT KNEE DIAM: 1.6 MM
VAS RIGHT GSV BK MID DIAM: 3.1 MM
VAS RIGHT GSV BK MID RFX: 1.3 S
VAS RIGHT GSV BK PROX DIAM: 1.9 MM
VAS RIGHT GSV BK PROX RFX: 5.1 S
VAS RIGHT GSV JUNC DIAM: 8 MM
VAS RIGHT GSV JUNC RFX: 1.9 S
VAS RIGHT GSV THIGH MID DIAM: 2.5 MM
VAS RIGHT GSV THIGH MID RFX: 7 S
VAS RIGHT GSV THIGH PROX DIAM: 3.1 MM
VAS RIGHT GSV THIGH PROX RFX: 0 S
VAS RIGHT SSV PROX DIAM: 2.84 MM
VAS RIGHT SSV PROX RFX: 0 S

## 2025-01-07 PROCEDURE — 93970 EXTREMITY STUDY: CPT | Performed by: STUDENT IN AN ORGANIZED HEALTH CARE EDUCATION/TRAINING PROGRAM

## 2025-01-08 DIAGNOSIS — M79.89 LEG SWELLING: ICD-10-CM

## 2025-01-08 DIAGNOSIS — I87.2 VENOUS INSUFFICIENCY: Primary | ICD-10-CM

## 2025-01-13 ENCOUNTER — ANESTHESIA EVENT (OUTPATIENT)
Dept: OPERATING ROOM | Age: 75
End: 2025-01-13
Payer: MEDICARE

## 2025-01-13 NOTE — H&P
Name:  Ann Bales  Age:  74 y.o.   :  1950     Physician: TIFFANY BUTLER MD         Chief Complaint: Dysphagia and History Crespo's        HPI:  Over the past month or so patient has noticed increasing difficulty with swallowing certain foods, particular bread.  She has known Crespo's esophagus and had a Nissen fundoplication on .  EGD in  did not show Crespo's, however we do have pathology reports to  and  which did show Crespo's.  Short segment Crespo's without dyplasia. Crespo's was also one the indication for fundoplication.  She also reports have esophageal dilation in the past.     Also question of a need for a colonoscopy. She had [facility] colonoscopy in .           MEDICAL HISTORY:     Past Medical History:    Past Medical History            Diagnosis Date    Acute pain of right shoulder 2023    Arthritis      Crespo's esophagus      Chronic obstructive pulmonary disease, unspecified COPD type (HCC) 10/16/2024    Cryptogenic stroke (HCC) 2022    Diabetes mellitus (HCC)      Epigastric pain 2021    Hypertension      Hypertrophic cardiomyopathy (HCC) 10/16/2024    Other skin changes 2024    Spinal stenosis of lumbar region at multiple levels 2019    Tobacco abuse 2016            Past Surgical History:    Past Surgical History             Procedure Laterality Date    APPENDECTOMY   1969    BACK SURGERY        upper disc    BACK SURGERY        protruded disc    CHOLECYSTECTOMY             COLONOSCOPY   2017    COLONOSCOPY   2021     Dr. Richardson - normal colonoscopy    COLONOSCOPY N/A 2021     COLORECTAL CANCER SCREENING, NOT HIGH RISK performed by Misael Richardson MD at [facility] OR    CYST REMOVAL   1965     pilonidal    ENDOSCOPY, COLON, DIAGNOSTIC   2019    GASTRIC FUNDOPLICATION   2006    HYSTERECTOMY (CERVIX STATUS UNKNOWN)   1998     still has ovaries    JOINT REPLACEMENT Left 2015     HIP    JOINT    Cholecalciferol (VITAMIN D-3 PO) Take by mouth at bedtime     Yes Provider, MD Jagruti   albuterol sulfate HFA (VENTOLIN HFA) 108 (90 Base) MCG/ACT inhaler Inhale 2 puffs into the lungs 4 times daily as needed for Wheezing 12/4/23   Yes Tc Stephens MD   vitamin B-12 (CYANOCOBALAMIN) 100 MCG tablet Take 10 tablets by mouth daily 3/29/17   Yes Provider, MD Jagruti   aspirin 81 MG EC tablet Take 1 tablet by mouth daily     Yes Provider, MD Jagruti   furosemide (LASIX) 20 MG tablet Take 1 tablet by mouth daily for 7 days 11/14/24 11/21/24   Columba Zazueta, APRN - CNP            Allergies        Allergies   Allergen Reactions    Penicillins Hives             reports that she has been smoking cigarettes. She has a 25 pack-year smoking history. She has never used smokeless tobacco.  reports current alcohol use.     Family History         Family History   Problem Relation Age of Onset    Diabetes Mother      Heart Disease Mother      Cancer Father      Diabetes Brother      Cancer Brother                    REVIEW OF SYSTEMS:  General:  Denies weight loss, fatigue  Eye:  negative   ENT:negative  Allergy/Immunology:  negative  Hematology/Lymphatic: negative  Lungs: No short  Cardiovascular: negative  Gastrointestinal: Recently hospitalized with nausea and vomiting, which has resolved.  : negative  Neurological: negative        PHYSICAL EXAM:     /77 (Site: Right Upper Arm, Position: Sitting, Cuff Size: Medium Adult)   Pulse 66   Wt 84.4 kg (186 lb)   BMI 27.47 kg/m²         Gen: Alert and oriented x3, no acute distress, well-appearing     Eyes: PERRL, Sclera Anicteric     Head: Normocephalic, non tender, Upper denture, poor dentition lower.     Neck: Supple, no significant adenopathy. No carotid bruits, thyroid normal size and no masses     Chest: CTA, no wheezes, no rales, no rhonchi, symmetrical     Heart: Normal rate, regular rhythm, no murmurs     Abdomen: Soft, positive bowel sounds,

## 2025-01-14 ENCOUNTER — ANESTHESIA (OUTPATIENT)
Dept: OPERATING ROOM | Age: 75
End: 2025-01-14
Payer: MEDICARE

## 2025-01-14 ENCOUNTER — HOSPITAL ENCOUNTER (OUTPATIENT)
Age: 75
Setting detail: OUTPATIENT SURGERY
Discharge: HOME | End: 2025-01-14
Attending: SURGERY | Admitting: SURGERY
Payer: MEDICARE

## 2025-01-14 VITALS
BODY MASS INDEX: 27.23 KG/M2 | RESPIRATION RATE: 18 BRPM | TEMPERATURE: 98.6 F | DIASTOLIC BLOOD PRESSURE: 66 MMHG | SYSTOLIC BLOOD PRESSURE: 133 MMHG | HEART RATE: 61 BPM | OXYGEN SATURATION: 96 % | WEIGHT: 190.2 LBS | HEIGHT: 70 IN

## 2025-01-14 DIAGNOSIS — K22.70 BARRETT ESOPHAGUS: ICD-10-CM

## 2025-01-14 DIAGNOSIS — R13.19 ESOPHAGEAL DYSPHAGIA: ICD-10-CM

## 2025-01-14 PROBLEM — K31.89 GASTRIC MASS: Status: ACTIVE | Noted: 2025-01-14

## 2025-01-14 PROCEDURE — 3700000001 HC ADD 15 MINUTES (ANESTHESIA): Performed by: SURGERY

## 2025-01-14 PROCEDURE — 43236 UPPR GI SCOPE W/SUBMUC INJ: CPT | Performed by: SURGERY

## 2025-01-14 PROCEDURE — 43239 EGD BIOPSY SINGLE/MULTIPLE: CPT | Performed by: SURGERY

## 2025-01-14 PROCEDURE — 3700000000 HC ANESTHESIA ATTENDED CARE: Performed by: SURGERY

## 2025-01-14 PROCEDURE — 88305 TISSUE EXAM BY PATHOLOGIST: CPT

## 2025-01-14 PROCEDURE — 7100000010 HC PHASE II RECOVERY - FIRST 15 MIN: Performed by: SURGERY

## 2025-01-14 PROCEDURE — 2500000003 HC RX 250 WO HCPCS: Performed by: NURSE ANESTHETIST, CERTIFIED REGISTERED

## 2025-01-14 PROCEDURE — 6360000002 HC RX W HCPCS: Performed by: NURSE ANESTHETIST, CERTIFIED REGISTERED

## 2025-01-14 PROCEDURE — 2709999900 HC NON-CHARGEABLE SUPPLY: Performed by: SURGERY

## 2025-01-14 PROCEDURE — 7100000011 HC PHASE II RECOVERY - ADDTL 15 MIN: Performed by: SURGERY

## 2025-01-14 PROCEDURE — 3609012400 HC EGD TRANSORAL BIOPSY SINGLE/MULTIPLE: Performed by: SURGERY

## 2025-01-14 PROCEDURE — 2580000003 HC RX 258: Performed by: NURSE ANESTHETIST, CERTIFIED REGISTERED

## 2025-01-14 RX ORDER — SODIUM CHLORIDE 9 MG/ML
INJECTION, SOLUTION INTRAVENOUS CONTINUOUS
Status: DISCONTINUED | OUTPATIENT
Start: 2025-01-14 | End: 2025-01-14 | Stop reason: HOSPADM

## 2025-01-14 RX ORDER — PROPOFOL 10 MG/ML
INJECTION, EMULSION INTRAVENOUS
Status: DISCONTINUED | OUTPATIENT
Start: 2025-01-14 | End: 2025-01-14 | Stop reason: SDUPTHER

## 2025-01-14 RX ORDER — LIDOCAINE HYDROCHLORIDE 20 MG/ML
INJECTION, SOLUTION EPIDURAL; INFILTRATION; INTRACAUDAL; PERINEURAL
Status: DISCONTINUED | OUTPATIENT
Start: 2025-01-14 | End: 2025-01-14 | Stop reason: SDUPTHER

## 2025-01-14 RX ORDER — METOPROLOL TARTRATE 50 MG
50 TABLET ORAL 2 TIMES DAILY
Qty: 180 TABLET | Refills: 3 | Status: SHIPPED | OUTPATIENT
Start: 2025-01-14

## 2025-01-14 RX ORDER — DEXMEDETOMIDINE HYDROCHLORIDE 100 UG/ML
INJECTION, SOLUTION INTRAVENOUS
Status: DISCONTINUED | OUTPATIENT
Start: 2025-01-14 | End: 2025-01-14 | Stop reason: SDUPTHER

## 2025-01-14 RX ADMIN — PROPOFOL 20 MG: 10 INJECTION, EMULSION INTRAVENOUS at 12:16

## 2025-01-14 RX ADMIN — DEXMEDETOMIDINE HCL 4 MCG: 100 INJECTION INTRAVENOUS at 12:15

## 2025-01-14 RX ADMIN — PROPOFOL 20 MG: 10 INJECTION, EMULSION INTRAVENOUS at 12:24

## 2025-01-14 RX ADMIN — PROPOFOL 50 MG: 10 INJECTION, EMULSION INTRAVENOUS at 12:11

## 2025-01-14 RX ADMIN — PROPOFOL 20 MG: 10 INJECTION, EMULSION INTRAVENOUS at 12:30

## 2025-01-14 RX ADMIN — LIDOCAINE HYDROCHLORIDE 5 ML: 20 INJECTION, SOLUTION EPIDURAL; INFILTRATION; INTRACAUDAL; PERINEURAL at 11:49

## 2025-01-14 RX ADMIN — DEXMEDETOMIDINE HCL 4 MCG: 100 INJECTION INTRAVENOUS at 12:11

## 2025-01-14 RX ADMIN — LIDOCAINE HYDROCHLORIDE 5 ML: 20 INJECTION, SOLUTION EPIDURAL; INFILTRATION; INTRACAUDAL; PERINEURAL at 12:09

## 2025-01-14 RX ADMIN — PROPOFOL 30 MG: 10 INJECTION, EMULSION INTRAVENOUS at 12:12

## 2025-01-14 RX ADMIN — PROPOFOL 30 MG: 10 INJECTION, EMULSION INTRAVENOUS at 12:27

## 2025-01-14 RX ADMIN — PROPOFOL 50 MG: 10 INJECTION, EMULSION INTRAVENOUS at 12:10

## 2025-01-14 RX ADMIN — PROPOFOL 30 MG: 10 INJECTION, EMULSION INTRAVENOUS at 12:14

## 2025-01-14 RX ADMIN — PROPOFOL 20 MG: 10 INJECTION, EMULSION INTRAVENOUS at 12:20

## 2025-01-14 RX ADMIN — PROPOFOL 20 MG: 10 INJECTION, EMULSION INTRAVENOUS at 12:18

## 2025-01-14 RX ADMIN — PROPOFOL 20 MG: 10 INJECTION, EMULSION INTRAVENOUS at 12:28

## 2025-01-14 RX ADMIN — PROPOFOL 20 MG: 10 INJECTION, EMULSION INTRAVENOUS at 12:22

## 2025-01-14 RX ADMIN — SODIUM CHLORIDE: 9 INJECTION, SOLUTION INTRAVENOUS at 11:28

## 2025-01-14 RX ADMIN — DEXMEDETOMIDINE HCL 4 MCG: 100 INJECTION INTRAVENOUS at 11:50

## 2025-01-14 RX ADMIN — PROPOFOL 20 MG: 10 INJECTION, EMULSION INTRAVENOUS at 12:26

## 2025-01-14 ASSESSMENT — PAIN - FUNCTIONAL ASSESSMENT
PAIN_FUNCTIONAL_ASSESSMENT: NONE - DENIES PAIN
PAIN_FUNCTIONAL_ASSESSMENT: 0-10
PAIN_FUNCTIONAL_ASSESSMENT: NONE - DENIES PAIN
PAIN_FUNCTIONAL_ASSESSMENT: NONE - DENIES PAIN

## 2025-01-14 ASSESSMENT — LIFESTYLE VARIABLES: SMOKING_STATUS: 1

## 2025-01-14 NOTE — ANESTHESIA PRE PROCEDURE
Department of Anesthesiology  Preprocedure Note       Name:  Ann Bales   Age:  74 y.o.  :  1950                                          MRN:  [id number]         Date:  2025      Surgeon: Surgeon(s):  Beto Tellez MD    Procedure: ESOPHAGOGASTRODUODENOSCOPY    Medications prior to admission:   Prior to Admission medications    Medication Sig Start Date End Date Taking? Authorizing Provider   atorvastatin (LIPITOR) 10 MG tablet Take 1 tablet by mouth daily 24  Yes Columba Zazueta APRN - CNP   metFORMIN (GLUCOPHAGE) 500 MG tablet Take 1 tablet by mouth 2 times daily (with meals) 24  Yes Columba Zazueta APRN - CNP   metoprolol tartrate (LOPRESSOR) 25 MG tablet Take 1 tablet by mouth 2 times daily  Patient taking differently: Take 2 tablets by mouth 2 times daily 24  Yes Billy Christianson MD   gabapentin (NEURONTIN) 800 MG tablet Take 1 tablet by mouth 2 times daily for 90 days. 10/16/24 1/14/25 Yes Columba Zazueta APRN - CNP   Cholecalciferol (VITAMIN D-3 PO) Take by mouth at bedtime   Yes ProviderJagruti MD   vitamin B-12 (CYANOCOBALAMIN) 1000 MCG tablet Take 1 tablet by mouth daily 3/29/17  Yes Jagruti Proctor MD   aspirin 81 MG EC tablet Take 1 tablet by mouth daily   Yes Jagruti Proctor MD   potassium chloride (KLOR-CON M20) 20 MEQ extended release tablet TAKE 1 TABLET BY MOUTH DAILY 24   Columba Zazueta APRN - CNP   Blood Glucose Monitoring Suppl (TRUE METRIX AIR GLUCOSE METER) w/Device KIT 1 each by Other route daily 24   Columba Zazueta APRN - CNP   TRUEplus Lancets 33G MISC 1 each by Other route daily 24   Columba Zazueta APRN - CNP   blood glucose monitor strips 1 strip by Other route daily Brand per patient preference. Test 1 times a day & as needed for symptoms of irregular blood glucose. Dispense sufficient amount for indicated testing frequency plus additional to accommodate PRN testing needs. 6/26/24 3/23/25  Columba Zazueta

## 2025-01-14 NOTE — DISCHARGE INSTRUCTIONS
You don't appear to have a stenosis or narrowing of your esophagus. You fundoplication appears to be intact.  4) You have gastritis and several superficial ulcers in your stomach.  5) You also had a small mass in your stomach.  6) Multiple other biopsies were done.    7) You will be called with the biopsy results.  As some of these may be complex, I recommend follow up in the office in 1 - 2 weeks to review the results in detail.  8) All of these problems are made worse by smoking, plus you have significant COPD with chronic bronchitis.   It is critical that you completely quit smoking.  9) Call if you have an questions or problems    Discharge Instructions for EGD     An EGD or upper GI endoscopy is a visual exam of the lining of the esophagus, stomach (gastric) and duodenum (the first part of the small intestine). An endoscopy is a flexible tube with a light and a viewing device. It allows the doctor to view the inside of the colon through a tiny video camera.   EGD is performed for many reasons: unexplained anemia , pain, bleeding, heart burn, among many other reasons.   Complications from a EGD are rare. Some possible serious complications include perforated bowel (which might require surgery) and bleeding (which could require blood transfusion ). Minor complications include bloating, gas, and cramping that can last for 1-2 days after the procedure.     Because air is put into your upper GI during the procedure, it is normal to pass large amounts of air from your rectum and burp a lot.     What You Will Need   Someone to drive you home after the procedure    Steps to Take   Home Care    Rest when you get home.    Because the sedative will make you drowsy, don't drive, operate machinery, or make important decisions the day of the procedure.      Feelings of bloating, gas, or cramping may persist for 24 hours.   Diet    Try sips of water first. If tolerated, resume regular diet or the diet recommended by your  You have severe belly pain.     You vomit blood or what looks like coffee grounds.     Your stools are maroon or very bloody.   Call your doctor now or seek immediate medical care if:    You are dizzy or lightheaded, or feel like you may faint.     You have trouble breathing or are breathing faster and passing only a little urine.     You have new or worse belly pain.     You have a new or higher fever.     You have signs of dehydration, such as:  Dry eyes and a dry mouth.  Passing only a little urine.  Feeling thirstier than usual.     You have nausea or vomiting and can't keep fluids down.     You cannot pass stools or gas.     You have new or more blood in your stools or your stools are black and tarlike.   Watch closely for changes in your health, and be sure to contact your doctor if:    You have new or worse symptoms.     You are losing weight.     You do not get better as expected.   Where can you learn more?  Go to https://[email or web address].net/patientEd and enter Z536 to learn more about \"Gastritis: Care Instructions.\"  Current as of: October 19, 2023  Content Version: 14.3  © 2024 [employer].   Care instructions adapted under license by [employer]. If you have questions about a medical condition or this instruction, always ask your healthcare professional. [employer], disclaims any warranty or liability for your use of this information.

## 2025-01-14 NOTE — ANESTHESIA POSTPROCEDURE EVALUATION
Department of Anesthesiology  Postprocedure Note    Patient: Ann Bales  MRN: [id number]  YOB: 1950  Date of evaluation: 1/14/2025    Procedure Summary       Date: 01/14/25 Room / Location: [place] [address] / [facility]    Anesthesia Start: 1146 Anesthesia Stop: 1239    Procedure: ESOPHAGOGASTRODUODENOSCOPY BIOPSY  WITH TATTOO (Abdomen) Diagnosis:       Crespo esophagus      Esophageal dysphagia      (Crespo esophagus [K22.70])      (Esophageal dysphagia [R13.19])    Surgeons: Beto Tellez MD Responsible Provider: Sunshine Soto APRN - CRNA    Anesthesia Type: general ASA Status: 3            Anesthesia Type: No value filed.    Norbert Phase I: Norbert Score: 9    Norbert Phase II: Norbert Score: 10    Anesthesia Post Evaluation    Patient location during evaluation: bedside  Patient participation: complete - patient participated  Level of consciousness: awake and alert  Airway patency: patent  Nausea & Vomiting: no nausea and no vomiting  Cardiovascular status: hemodynamically stable  Respiratory status: acceptable  Hydration status: stable  Multimodal analgesia pain management approach  Pain management: adequate    No notable events documented.

## 2025-01-17 LAB — SURGICAL PATHOLOGY REPORT: NORMAL

## 2025-01-20 ENCOUNTER — TELEPHONE (OUTPATIENT)
Dept: SURGERY | Age: 75
End: 2025-01-20

## 2025-01-20 NOTE — TELEPHONE ENCOUNTER
----- Message from Dr. Beto Tellez MD sent at 1/20/2025 12:49 PM EST -----  Biopsies of stomach are consistent with focal erosive gastritis and superficial ulcer.  \"Mass\" was an inflamed polyp, hyperplastic, not worrisome. Esophageal biopsies show esophagitis without Crespo's.  This is the second scope she has had did not show Crespo's although pathology on previous EGD's did.   Extensive biopsies were done.  So she has minimal if any residual Crespo's.   - Here biggest risk for gastritis, and GERD is ongoing cigarette smoking.  - Quitting smoking should be her top priority. I will help her gastritis, GERD, COPD, and her heart.   - Otherwise focus on ongoing antireflux diet and lifestyle changes.  - Consider a repeat EGD in 5 years, but only if her health is going well at that point.

## 2025-02-10 ENCOUNTER — OFFICE VISIT (OUTPATIENT)
Dept: VASCULAR SURGERY | Age: 75
End: 2025-02-10
Payer: MEDICARE

## 2025-02-10 VITALS
HEART RATE: 67 BPM | WEIGHT: 194.5 LBS | TEMPERATURE: 96.1 F | DIASTOLIC BLOOD PRESSURE: 76 MMHG | HEIGHT: 70 IN | RESPIRATION RATE: 18 BRPM | BODY MASS INDEX: 27.84 KG/M2 | SYSTOLIC BLOOD PRESSURE: 130 MMHG

## 2025-02-10 DIAGNOSIS — G62.9 NEUROPATHY: ICD-10-CM

## 2025-02-10 DIAGNOSIS — I50.32 CHRONIC DIASTOLIC HEART FAILURE (HCC): Primary | ICD-10-CM

## 2025-02-10 DIAGNOSIS — I83.90 ASYMPTOMATIC VARICOSE VEINS: ICD-10-CM

## 2025-02-10 PROCEDURE — 99205 OFFICE O/P NEW HI 60 MIN: CPT | Performed by: INTERNAL MEDICINE

## 2025-02-10 PROCEDURE — 1159F MED LIST DOCD IN RCRD: CPT | Performed by: INTERNAL MEDICINE

## 2025-02-10 PROCEDURE — G8427 DOCREV CUR MEDS BY ELIG CLIN: HCPCS | Performed by: INTERNAL MEDICINE

## 2025-02-10 PROCEDURE — 1090F PRES/ABSN URINE INCON ASSESS: CPT | Performed by: INTERNAL MEDICINE

## 2025-02-10 PROCEDURE — G8419 CALC BMI OUT NRM PARAM NOF/U: HCPCS | Performed by: INTERNAL MEDICINE

## 2025-02-10 PROCEDURE — 1123F ACP DISCUSS/DSCN MKR DOCD: CPT | Performed by: INTERNAL MEDICINE

## 2025-02-10 PROCEDURE — 3078F DIAST BP <80 MM HG: CPT | Performed by: INTERNAL MEDICINE

## 2025-02-10 PROCEDURE — G8399 PT W/DXA RESULTS DOCUMENT: HCPCS | Performed by: INTERNAL MEDICINE

## 2025-02-10 PROCEDURE — 4004F PT TOBACCO SCREEN RCVD TLK: CPT | Performed by: INTERNAL MEDICINE

## 2025-02-10 PROCEDURE — 1125F AMNT PAIN NOTED PAIN PRSNT: CPT | Performed by: INTERNAL MEDICINE

## 2025-02-10 PROCEDURE — 3075F SYST BP GE 130 - 139MM HG: CPT | Performed by: INTERNAL MEDICINE

## 2025-02-10 PROCEDURE — 3017F COLORECTAL CA SCREEN DOC REV: CPT | Performed by: INTERNAL MEDICINE

## 2025-02-10 NOTE — PROGRESS NOTES
Ann Bales was seen on 2/10/2025 for   Chief Complaint   Patient presents with    Leg Pain     Patient reports bilateral lower leg pain for approx. 10 years. Denies leg swelling. Has varicose veins. Neuropathy-taking Gabapentin. Completed reflux study on 1/6/25.                               REVIEW OF SYSTEMS    Constitutional Weight: absent, A, Fatigue: absent Fever: absent   HEENT Ears: normal,Visual disturbance: absent Sore throat: absent,    Respiratory Shortness of breath: absent, Cough: absent;, Snoring: absent   Cardivascular Chest pain: absent,  Leg pain: present,Leg swelling:absent, Non-healing wound:absent    GI Diarrhea: absent, Abdominal Pain: absent    Urinary frequency: absent, Urinary incontinence: absent   Musculoskeletal Neck pain: absent, Back pain: present, Restless Leg:absent     Dermatological Hair loss: absent, Skin changes: absent   Neurological  Sudden Loss of Vision in one eye:absent, Slurred Speech:absent, Weakness on one side of body: absent,Headache: absent   Psychiatric Anxiety: absent, Depression: absent   Hematologic Abnormal bleeding: absent,

## 2025-02-10 NOTE — PROGRESS NOTES
Ann Bales was seen on 2/10/2025 for   Chief Complaint   Patient presents with    Leg Pain     Patient reports bilateral lower leg pain for approx. 10 years. Denies leg swelling. Has varicose veins. Neuropathy-taking Gabapentin. Completed reflux study on 1/6/25.   .  2/10/25 1st [facility] Vascular visit. Referred by Columba Zazueta CNP. Chief complaint is pain in her calves with standing. Seasonal in that some seasons its worse than others. Might get slightly worse with walking. Seems like her balance is off. Her walking is limited by instability. Had seen neuro for years for neuropathy. He prescribed gabapentin, which is essential for her. If she doesn't take it she gets much worse. Takes metformin for \"borderline\" diabetes. Years.   Not aware of blocked arteries anywhere.  Had bilateral vein treatment at a vein clinic in [place]. Veins were \"somewhat\" better after treatment. She had varicose veins with leg sx with standing. Most painful surgery she ever had, because of tumescent.   Current every day smoker.  Had leg swelling \"a while back\" but that has resolved. She thinks that was reason for referral originally. Not aware of CHF hx, but BNP was 572 11/14/24. No current skin breakdown. Records show left medial ankle skin breakdown 2022. 12/18/24 diastolic dysfn on echo. Ef 60. LFT's ok. 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     Social History     Socioeconomic History    Marital status:      Spouse name: Not on file    Number of children: Not on file    Years of education: Not on file    Highest education level: Not on file   Occupational History    Not on file   Tobacco Use    Smoking status: Every Day     Current packs/day: 0.50     Average packs/day: 0.5 packs/day for 50.0 years (25.0 ttl pk-yrs)     Types: Cigarettes    Smokeless tobacco: Never   Vaping Use    Vaping status: Never Used   Substance and Sexual Activity    Alcohol use: Yes     Comment: Occasionally    Drug use: No    Sexual

## 2025-02-10 NOTE — PATIENT INSTRUCTIONS
SURVEY:    Thank you for allowing us to care for you today.    You may be receiving a survey from [facility] regarding your visit today- electronically or via mail.      Please help us by completing the survey as this will provide the needed feedback to ensure we are providing the very best care for you and your family.    If you cannot score us a very good on any question, please call the office to discuss how we could have made your experience a very good one.    Thank you.       STAFF:    Ying Montano, Elizabeth ABDULLAHI      CLINICAL STAFF:    Julissa BRANCH, Sunshine ABDULLAHI, Brit ABDULLAHI, Aida BRANCH

## 2025-02-13 DIAGNOSIS — E11.9 TYPE 2 DIABETES MELLITUS WITHOUT COMPLICATION, WITHOUT LONG-TERM CURRENT USE OF INSULIN (HCC): ICD-10-CM

## 2025-02-17 RX ORDER — GABAPENTIN 800 MG/1
800 TABLET ORAL 2 TIMES DAILY
Qty: 180 TABLET | Refills: 0 | Status: SHIPPED | OUTPATIENT
Start: 2025-02-17 | End: 2025-05-18

## 2025-02-17 RX ORDER — DIPHENHYDRAMINE HCL 25 MG
TABLET ORAL
Qty: 1 KIT | Refills: 3 | Status: SHIPPED | OUTPATIENT
Start: 2025-02-17

## 2025-02-17 RX ORDER — CALCIUM CITRATE/VITAMIN D3 200MG-6.25
TABLET ORAL
Qty: 200 STRIP | Refills: 3 | Status: SHIPPED | OUTPATIENT
Start: 2025-02-17

## 2025-02-19 ENCOUNTER — NURSE ONLY (OUTPATIENT)
Dept: CARDIOLOGY | Age: 75
End: 2025-02-19

## 2025-02-19 DIAGNOSIS — I63.9 CRYPTOGENIC STROKE (HCC): ICD-10-CM

## 2025-02-19 DIAGNOSIS — R55 SYNCOPE, UNSPECIFIED SYNCOPE TYPE: Primary | ICD-10-CM

## 2025-02-19 DIAGNOSIS — Z45.09 ENCOUNTER FOR LOOP RECORDER CHECK: ICD-10-CM

## 2025-03-17 ENCOUNTER — OFFICE VISIT (OUTPATIENT)
Dept: PRIMARY CARE CLINIC | Age: 75
End: 2025-03-17
Payer: MEDICARE

## 2025-03-17 VITALS
BODY MASS INDEX: 28.38 KG/M2 | WEIGHT: 195 LBS | SYSTOLIC BLOOD PRESSURE: 120 MMHG | OXYGEN SATURATION: 97 % | TEMPERATURE: 95 F | HEART RATE: 61 BPM | DIASTOLIC BLOOD PRESSURE: 80 MMHG

## 2025-03-17 DIAGNOSIS — E11.9 TYPE 2 DIABETES MELLITUS WITHOUT COMPLICATION, WITHOUT LONG-TERM CURRENT USE OF INSULIN: ICD-10-CM

## 2025-03-17 DIAGNOSIS — I10 ESSENTIAL (PRIMARY) HYPERTENSION: Primary | Chronic | ICD-10-CM

## 2025-03-17 DIAGNOSIS — J44.9 CHRONIC OBSTRUCTIVE PULMONARY DISEASE, UNSPECIFIED COPD TYPE (HCC): ICD-10-CM

## 2025-03-17 DIAGNOSIS — Z13.220 LIPID SCREENING: ICD-10-CM

## 2025-03-17 PROCEDURE — 99214 OFFICE O/P EST MOD 30 MIN: CPT | Performed by: NURSE PRACTITIONER

## 2025-03-17 PROCEDURE — 3023F SPIROM DOC REV: CPT | Performed by: NURSE PRACTITIONER

## 2025-03-17 PROCEDURE — G8427 DOCREV CUR MEDS BY ELIG CLIN: HCPCS | Performed by: NURSE PRACTITIONER

## 2025-03-17 PROCEDURE — 2022F DILAT RTA XM EVC RTNOPTHY: CPT | Performed by: NURSE PRACTITIONER

## 2025-03-17 PROCEDURE — 1090F PRES/ABSN URINE INCON ASSESS: CPT | Performed by: NURSE PRACTITIONER

## 2025-03-17 PROCEDURE — 1123F ACP DISCUSS/DSCN MKR DOCD: CPT | Performed by: NURSE PRACTITIONER

## 2025-03-17 PROCEDURE — 3079F DIAST BP 80-89 MM HG: CPT | Performed by: NURSE PRACTITIONER

## 2025-03-17 PROCEDURE — 3017F COLORECTAL CA SCREEN DOC REV: CPT | Performed by: NURSE PRACTITIONER

## 2025-03-17 PROCEDURE — 3074F SYST BP LT 130 MM HG: CPT | Performed by: NURSE PRACTITIONER

## 2025-03-17 PROCEDURE — G8419 CALC BMI OUT NRM PARAM NOF/U: HCPCS | Performed by: NURSE PRACTITIONER

## 2025-03-17 PROCEDURE — 1159F MED LIST DOCD IN RCRD: CPT | Performed by: NURSE PRACTITIONER

## 2025-03-17 PROCEDURE — 4004F PT TOBACCO SCREEN RCVD TLK: CPT | Performed by: NURSE PRACTITIONER

## 2025-03-17 PROCEDURE — G8399 PT W/DXA RESULTS DOCUMENT: HCPCS | Performed by: NURSE PRACTITIONER

## 2025-03-17 PROCEDURE — 3046F HEMOGLOBIN A1C LEVEL >9.0%: CPT | Performed by: NURSE PRACTITIONER

## 2025-03-17 ASSESSMENT — PATIENT HEALTH QUESTIONNAIRE - PHQ9
7. TROUBLE CONCENTRATING ON THINGS, SUCH AS READING THE NEWSPAPER OR WATCHING TELEVISION: NOT AT ALL
5. POOR APPETITE OR OVEREATING: SEVERAL DAYS
2. FEELING DOWN, DEPRESSED OR HOPELESS: NEARLY EVERY DAY
SUM OF ALL RESPONSES TO PHQ QUESTIONS 1-9: 7
1. LITTLE INTEREST OR PLEASURE IN DOING THINGS: SEVERAL DAYS
SUM OF ALL RESPONSES TO PHQ QUESTIONS 1-9: 7
SUM OF ALL RESPONSES TO PHQ QUESTIONS 1-9: 7
6. FEELING BAD ABOUT YOURSELF - OR THAT YOU ARE A FAILURE OR HAVE LET YOURSELF OR YOUR FAMILY DOWN: NOT AT ALL
9. THOUGHTS THAT YOU WOULD BE BETTER OFF DEAD, OR OF HURTING YOURSELF: NOT AT ALL
SUM OF ALL RESPONSES TO PHQ QUESTIONS 1-9: 7
8. MOVING OR SPEAKING SO SLOWLY THAT OTHER PEOPLE COULD HAVE NOTICED. OR THE OPPOSITE, BEING SO FIGETY OR RESTLESS THAT YOU HAVE BEEN MOVING AROUND A LOT MORE THAN USUAL: NOT AT ALL
10. IF YOU CHECKED OFF ANY PROBLEMS, HOW DIFFICULT HAVE THESE PROBLEMS MADE IT FOR YOU TO DO YOUR WORK, TAKE CARE OF THINGS AT HOME, OR GET ALONG WITH OTHER PEOPLE: SOMEWHAT DIFFICULT
3. TROUBLE FALLING OR STAYING ASLEEP: SEVERAL DAYS
4. FEELING TIRED OR HAVING LITTLE ENERGY: SEVERAL DAYS

## 2025-03-17 NOTE — PROGRESS NOTES
Standing Status:   Future     Expected Date:   3/17/2025     Expiration Date:   3/17/2026        No results found for this visit on 03/17/25.    Return in about 3 months (around 6/17/2025), or if symptoms worsen or fail to improve, for 3 month f/u .    Electronically signed by MURRAY Agosto CNP on 03/17/25 at 12:53 PM.

## 2025-04-01 RX ORDER — POTASSIUM CHLORIDE 1500 MG/1
20 TABLET, EXTENDED RELEASE ORAL DAILY
Qty: 90 TABLET | Refills: 0 | Status: SHIPPED | OUTPATIENT
Start: 2025-04-01

## 2025-04-01 NOTE — TELEPHONE ENCOUNTER
Patient called in regarding her Potassium Chloride. Order is pended and pharmacy is verified to [facility] in [place] instead of [facility].

## 2025-04-28 ENCOUNTER — PATIENT MESSAGE (OUTPATIENT)
Dept: UROLOGY | Age: 75
End: 2025-04-28

## 2025-05-02 RX ORDER — ATORVASTATIN CALCIUM 10 MG/1
10 TABLET, FILM COATED ORAL DAILY
Qty: 90 TABLET | Refills: 3 | Status: SHIPPED | OUTPATIENT
Start: 2025-05-02

## 2025-05-20 ENCOUNTER — HOSPITAL ENCOUNTER (OUTPATIENT)
Dept: GENERAL RADIOLOGY | Age: 75
Discharge: HOME | End: 2025-05-22
Payer: MEDICARE

## 2025-05-20 DIAGNOSIS — N20.0 RENAL CALCULI: ICD-10-CM

## 2025-05-20 PROCEDURE — 74018 RADEX ABDOMEN 1 VIEW: CPT

## 2025-05-21 ENCOUNTER — CLINICAL SUPPORT (OUTPATIENT)
Dept: CARDIOLOGY | Age: 75
End: 2025-05-21

## 2025-05-21 DIAGNOSIS — I63.9 CRYPTOGENIC STROKE (HCC): ICD-10-CM

## 2025-05-21 DIAGNOSIS — Z45.09 ENCOUNTER FOR LOOP RECORDER CHECK: ICD-10-CM

## 2025-05-21 DIAGNOSIS — R55 SYNCOPE, UNSPECIFIED SYNCOPE TYPE: Primary | ICD-10-CM

## 2025-05-27 ENCOUNTER — RESULTS FOLLOW-UP (OUTPATIENT)
Dept: UROLOGY | Age: 75
End: 2025-05-27

## 2025-05-29 ENCOUNTER — CLINICAL SUPPORT (OUTPATIENT)
Dept: CARDIOLOGY | Age: 75
End: 2025-05-29

## 2025-05-29 DIAGNOSIS — Z45.09 ENCOUNTER FOR LOOP RECORDER CHECK: ICD-10-CM

## 2025-05-29 DIAGNOSIS — R55 SYNCOPE, UNSPECIFIED SYNCOPE TYPE: Primary | ICD-10-CM

## 2025-06-02 RX ORDER — GABAPENTIN 800 MG/1
800 TABLET ORAL 2 TIMES DAILY
Qty: 180 TABLET | Refills: 0 | Status: SHIPPED | OUTPATIENT
Start: 2025-06-02 | End: 2025-08-31

## 2025-06-11 ENCOUNTER — OFFICE VISIT (OUTPATIENT)
Dept: UROLOGY | Age: 75
End: 2025-06-11
Payer: MEDICARE

## 2025-06-11 VITALS
DIASTOLIC BLOOD PRESSURE: 68 MMHG | TEMPERATURE: 97.8 F | BODY MASS INDEX: 28.09 KG/M2 | SYSTOLIC BLOOD PRESSURE: 112 MMHG | WEIGHT: 193 LBS

## 2025-06-11 DIAGNOSIS — N20.0 RENAL CALCULI: Primary | ICD-10-CM

## 2025-06-11 PROCEDURE — 4004F PT TOBACCO SCREEN RCVD TLK: CPT | Performed by: UROLOGY

## 2025-06-11 PROCEDURE — 3074F SYST BP LT 130 MM HG: CPT | Performed by: UROLOGY

## 2025-06-11 PROCEDURE — G8419 CALC BMI OUT NRM PARAM NOF/U: HCPCS | Performed by: UROLOGY

## 2025-06-11 PROCEDURE — 1159F MED LIST DOCD IN RCRD: CPT | Performed by: UROLOGY

## 2025-06-11 PROCEDURE — 1123F ACP DISCUSS/DSCN MKR DOCD: CPT | Performed by: UROLOGY

## 2025-06-11 PROCEDURE — 99214 OFFICE O/P EST MOD 30 MIN: CPT | Performed by: UROLOGY

## 2025-06-11 PROCEDURE — 3017F COLORECTAL CA SCREEN DOC REV: CPT | Performed by: UROLOGY

## 2025-06-11 PROCEDURE — 1090F PRES/ABSN URINE INCON ASSESS: CPT | Performed by: UROLOGY

## 2025-06-11 PROCEDURE — G8399 PT W/DXA RESULTS DOCUMENT: HCPCS | Performed by: UROLOGY

## 2025-06-11 PROCEDURE — 3078F DIAST BP <80 MM HG: CPT | Performed by: UROLOGY

## 2025-06-11 PROCEDURE — G8427 DOCREV CUR MEDS BY ELIG CLIN: HCPCS | Performed by: UROLOGY

## 2025-06-11 NOTE — PROGRESS NOTES
HPI:        Patient is a 74 y.o. female in no acute distress.  She is alert and oriented to person, place, and time.        History  Patient is being seen here today as a new patient.  Patient was referred to us by MURRAY Nevarez.  Patient was referred to us for renal calculus.  Patient did get a recent CT scan.  This film was independently reviewed today.  This does show multiple stones in the left kidney.  Patient does have a large cyst in the left kidney as well.  I did review her films from 2 years ago CT scan.  This did show that the cyst and the stones were there.  The stones do appear to be larger in size the cyst does appear to be stable in size.  No pain today..  Patient does have a stone history.  She has had visits before.  But never has had any stone surgery.  She has never seen urology in the past       Currently  Patient is here today for 6-month follow-up.  We do see the patient for renal stones.  Patient to get a repeat KUB.  This film was independently reviewed today.  This does not show any stones.  Patient did have a recent CT scan.  This did show an 1.1 cm stone in the left kidney.  Patient is currently asymptomatic.  She has no current gross hematuria or dysuria.  She has no flank pain nausea vomiting.  No fevers today    Past Medical History:   Diagnosis Date    Acute pain of right shoulder 06/29/2023    Arthritis     Crespo's esophagus     Cerebrovascular disease 2010    Chronic obstructive pulmonary disease, unspecified COPD type (HCC) 10/16/2024    Cryptogenic stroke (HCC) 11/08/2022    Diabetes mellitus (HCC)     Emphysema of lung (HCC) 01/02/2024    Epigastric pain 08/31/2021    GERD (gastroesophageal reflux disease) 1998    Hypertension     Hypertrophic cardiomyopathy (HCC) 10/16/2024    Neuropathy 2010    Obesity 2000    Osteoarthritis 2018    Other skin changes 06/17/2024    Spinal stenosis of lumbar region at multiple levels 01/18/2019    Tobacco abuse 06/09/2016    Type 2

## 2025-06-19 ENCOUNTER — OFFICE VISIT (OUTPATIENT)
Dept: PRIMARY CARE CLINIC | Age: 75
End: 2025-06-19
Payer: MEDICARE

## 2025-06-19 VITALS
TEMPERATURE: 97 F | SYSTOLIC BLOOD PRESSURE: 138 MMHG | OXYGEN SATURATION: 95 % | BODY MASS INDEX: 29.18 KG/M2 | HEART RATE: 59 BPM | DIASTOLIC BLOOD PRESSURE: 80 MMHG | WEIGHT: 197 LBS | HEIGHT: 69 IN | RESPIRATION RATE: 16 BRPM

## 2025-06-19 DIAGNOSIS — R26.89 POOR BALANCE: ICD-10-CM

## 2025-06-19 DIAGNOSIS — M81.0 OSTEOPOROSIS, UNSPECIFIED OSTEOPOROSIS TYPE, UNSPECIFIED PATHOLOGICAL FRACTURE PRESENCE: ICD-10-CM

## 2025-06-19 DIAGNOSIS — E11.9 TYPE 2 DIABETES MELLITUS WITHOUT COMPLICATION, WITHOUT LONG-TERM CURRENT USE OF INSULIN (HCC): Primary | ICD-10-CM

## 2025-06-19 DIAGNOSIS — I10 ESSENTIAL (PRIMARY) HYPERTENSION: Chronic | ICD-10-CM

## 2025-06-19 DIAGNOSIS — Z13.220 LIPID SCREENING: ICD-10-CM

## 2025-06-19 PROBLEM — J84.115 RESPIRATORY BRONCHIOLITIS ASSOCIATED INTERSTITIAL LUNG DISEASE (HCC): Status: RESOLVED | Noted: 2024-10-16 | Resolved: 2025-06-19

## 2025-06-19 LAB — HBA1C MFR BLD: 6.7 %

## 2025-06-19 PROCEDURE — 1159F MED LIST DOCD IN RCRD: CPT | Performed by: NURSE PRACTITIONER

## 2025-06-19 PROCEDURE — 3075F SYST BP GE 130 - 139MM HG: CPT | Performed by: NURSE PRACTITIONER

## 2025-06-19 PROCEDURE — 4004F PT TOBACCO SCREEN RCVD TLK: CPT | Performed by: NURSE PRACTITIONER

## 2025-06-19 PROCEDURE — 83036 HEMOGLOBIN GLYCOSYLATED A1C: CPT | Performed by: NURSE PRACTITIONER

## 2025-06-19 PROCEDURE — 3044F HG A1C LEVEL LT 7.0%: CPT | Performed by: NURSE PRACTITIONER

## 2025-06-19 PROCEDURE — 3017F COLORECTAL CA SCREEN DOC REV: CPT | Performed by: NURSE PRACTITIONER

## 2025-06-19 PROCEDURE — G2211 COMPLEX E/M VISIT ADD ON: HCPCS | Performed by: NURSE PRACTITIONER

## 2025-06-19 PROCEDURE — G8399 PT W/DXA RESULTS DOCUMENT: HCPCS | Performed by: NURSE PRACTITIONER

## 2025-06-19 PROCEDURE — G8427 DOCREV CUR MEDS BY ELIG CLIN: HCPCS | Performed by: NURSE PRACTITIONER

## 2025-06-19 PROCEDURE — 2022F DILAT RTA XM EVC RTNOPTHY: CPT | Performed by: NURSE PRACTITIONER

## 2025-06-19 PROCEDURE — 99214 OFFICE O/P EST MOD 30 MIN: CPT | Performed by: NURSE PRACTITIONER

## 2025-06-19 PROCEDURE — 1090F PRES/ABSN URINE INCON ASSESS: CPT | Performed by: NURSE PRACTITIONER

## 2025-06-19 PROCEDURE — 3079F DIAST BP 80-89 MM HG: CPT | Performed by: NURSE PRACTITIONER

## 2025-06-19 PROCEDURE — 1123F ACP DISCUSS/DSCN MKR DOCD: CPT | Performed by: NURSE PRACTITIONER

## 2025-06-19 PROCEDURE — G8419 CALC BMI OUT NRM PARAM NOF/U: HCPCS | Performed by: NURSE PRACTITIONER

## 2025-06-19 NOTE — PROGRESS NOTES
Name: Ann Bales  : 1950         Chief Complaint:     Chief Complaint   Patient presents with    Diabetes     Medication compliance:  compliant all of the time  Diabetic diet compliance:  compliant most of the time  Current exercise: no regular exercise  Frequency of testing: used to test regularly, every few days  Home blood sugar records: 120-125  Any episodes of hypoglycemia? no  Eye exam current (within one year): no  Diabetic foot check in the past year No:     Hypertension      Patient here for follow-up of elevated blood pressure. She is not exercising and is adherent to low salt diet.  Blood pressure is well controlled at home. Cardiac symptoms none. Patient denies chest pain and fatigue.  Cardiovascular risk factors: diabetes mellitus.     Anxiety     The patient presents for follow up of anxiety disorder. She has the following anxiety symptoms: racing thoughts. Current treatment includes .       Dizziness     -patient is here with c/o being scared of falling. States she runs into walls and objects occasionally.  -patient states she is more worried about getting back up if she were to fall.   -reports that balance is off and not great.        History of Present Illness:      Ann Bales is a 74 y.o.  female who presents with Diabetes (Medication compliance:  compliant all of the time/Diabetic diet compliance:  compliant most of the time/Current exercise: no regular exercise/Frequency of testing: used to test regularly, every few days/Home blood sugar records: 120-125/Any episodes of hypoglycemia? no/Eye exam current (within one year): no/Diabetic foot check in the past year No: ), Hypertension ( Patient here for follow-up of elevated blood pressure. She is not exercising and is adherent to low salt diet.  Blood pressure is well controlled at home. Cardiac symptoms none. Patient denies chest pain and fatigue.  Cardiovascular risk factors: diabetes mellitus. ), Anxiety (The patient presents

## 2025-07-08 DIAGNOSIS — E87.6 HYPOKALEMIA: Primary | ICD-10-CM

## 2025-07-08 RX ORDER — POTASSIUM CHLORIDE 1500 MG/1
20 TABLET, EXTENDED RELEASE ORAL DAILY
Qty: 90 TABLET | Refills: 1 | Status: SHIPPED | OUTPATIENT
Start: 2025-07-08

## 2025-07-26 DIAGNOSIS — E87.6 HYPOKALEMIA: ICD-10-CM

## 2025-07-28 RX ORDER — POTASSIUM CHLORIDE 1500 MG/1
20 TABLET, EXTENDED RELEASE ORAL DAILY
Qty: 90 TABLET | Refills: 1 | Status: SHIPPED | OUTPATIENT
Start: 2025-07-28

## 2025-07-29 ENCOUNTER — CLINICAL SUPPORT (OUTPATIENT)
Dept: CARDIOLOGY | Age: 75
End: 2025-07-29
Payer: MEDICARE

## 2025-07-29 ENCOUNTER — HOSPITAL ENCOUNTER (OUTPATIENT)
Dept: MRI IMAGING | Age: 75
Discharge: HOME | End: 2025-07-31
Payer: MEDICARE

## 2025-07-29 DIAGNOSIS — Z45.09 ENCOUNTER FOR LOOP RECORDER CHECK: ICD-10-CM

## 2025-07-29 DIAGNOSIS — R26.89 POOR BALANCE: ICD-10-CM

## 2025-07-29 DIAGNOSIS — R55 SYNCOPE, UNSPECIFIED SYNCOPE TYPE: Primary | ICD-10-CM

## 2025-07-29 PROCEDURE — 93298 REM INTERROG DEV EVAL SCRMS: CPT | Performed by: FAMILY MEDICINE

## 2025-07-29 PROCEDURE — 70551 MRI BRAIN STEM W/O DYE: CPT

## (undated) DEVICE — BIPOLAR SEALER 23-112-1 AQM 6.0: Brand: AQUAMANTYS ®

## (undated) DEVICE — 4.0MM PRECISION ROUND

## (undated) DEVICE — 450 ML BOTTLE OF 0.05% CHLORHEXIDINE GLUCONATE IN 99.95% STERILE WATER FOR IRRIGATION, USP AND APPLICATOR.: Brand: IRRISEPT ANTIMICROBIAL WOUND LAVAGE

## (undated) DEVICE — MEDI-VAC NON-CONDUCTIVE TUBING7MM X 30.5 (100FT): Brand: CARDINAL HEALTH

## (undated) DEVICE — PROBE STIM 3 MM FOR PEDCL SCREW DISP

## (undated) DEVICE — GLOVE SURG SZ 65 THK91MIL LTX FREE SYN POLYISOPRENE

## (undated) DEVICE — Device: Brand: SPOT ENDOSOPIC MARKER

## (undated) DEVICE — DRAIN SURG 10FR PVC TB W/ TRCR MID PERF NO RESVR HUBLESS

## (undated) DEVICE — SOLUTION IV IRRIG POUR BRL 0.9% SODIUM CHL 2F7124

## (undated) DEVICE — SUTURE NONABSORBABLE MONOFILAMENT 5-0 C-1 1X24 IN PROLENE 8725H

## (undated) DEVICE — TUBING SUCT NON-STRL 9/32X100 W/CNNT

## (undated) DEVICE — GOWN,SIRUS,NON REINFRCD,LARGE,SET IN SL: Brand: MEDLINE

## (undated) DEVICE — FORCEP SPEC RETRV BX AD 2 MMX155 CM 5 MM GI OVL CUP W/ NDL

## (undated) DEVICE — NEEDLE 25GAX5.0MM INJ CARR LOCKE

## (undated) DEVICE — SOLUTION IRRIG 1000ML 0.9% SOD CHL USP POUR PLAS BTL

## (undated) DEVICE — TAPE SURG W4INXL11YD 2IN PERF LINERLESS NONWOVEN MEDFIX EZ

## (undated) DEVICE — JCKSON TBL POSTNER NO HD REST: Brand: MEDLINE INDUSTRIES, INC.

## (undated) DEVICE — INTEGUSEAL MICROBIAL SEALANT: Brand: AVANOS

## (undated) DEVICE — KIT EVAC 400CC PVC RADPQ Y CONN

## (undated) DEVICE — BASIC SINGLE BASIN BTC-LF: Brand: MEDLINE INDUSTRIES, INC.

## (undated) DEVICE — FORCEPS BX L240CM JAW DIA2.8MM L CAP W/ NDL MIC MESH TOOTH

## (undated) DEVICE — GLOVE ORANGE PI 7 1/2   MSG9075

## (undated) DEVICE — CANNULA ORAL NSL AD CO2 N INTUB O2 DEL DISP TRU LNK

## (undated) DEVICE — GLOVE ORANGE PI 8   MSG9080

## (undated) DEVICE — SUTURE VCRL SZ 1 L18IN ABSRB VLT CTX L48MM 1/2 CIR J765D

## (undated) DEVICE — Device

## (undated) DEVICE — PAD,NON-ADHERENT,3X8,STERILE,LF,1/PK: Brand: MEDLINE

## (undated) DEVICE — THE MILL DISPOSABLE - MEDIUM

## (undated) DEVICE — CODMAN® SURGICAL PATTIES 1" X 1" (2.54CM X 2.54CM): Brand: CODMAN®